# Patient Record
Sex: FEMALE | Race: BLACK OR AFRICAN AMERICAN | NOT HISPANIC OR LATINO | Employment: UNEMPLOYED | URBAN - METROPOLITAN AREA
[De-identification: names, ages, dates, MRNs, and addresses within clinical notes are randomized per-mention and may not be internally consistent; named-entity substitution may affect disease eponyms.]

---
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
## 2018-05-03 PROBLEM — Z13.220 SCREENING FOR CHOLESTEROL LEVEL: Status: ACTIVE | Noted: 2018-05-03

## 2018-05-03 PROBLEM — B37.31 YEAST VAGINITIS: Status: ACTIVE | Noted: 2018-05-03

## 2018-08-06 PROBLEM — Z13.220 SCREENING FOR CHOLESTEROL LEVEL: Status: RESOLVED | Noted: 2018-05-03 | Resolved: 2018-08-06

## 2018-11-06 ENCOUNTER — OFFICE VISIT (OUTPATIENT)
Dept: OBSTETRICS AND GYNECOLOGY | Facility: CLINIC | Age: 18
End: 2018-11-06
Payer: MEDICAID

## 2018-11-06 VITALS
BODY MASS INDEX: 22.56 KG/M2 | DIASTOLIC BLOOD PRESSURE: 72 MMHG | HEIGHT: 65 IN | HEART RATE: 79 BPM | WEIGHT: 135.38 LBS | RESPIRATION RATE: 13 BRPM | SYSTOLIC BLOOD PRESSURE: 118 MMHG

## 2018-11-06 DIAGNOSIS — Z11.3 SCREEN FOR STD (SEXUALLY TRANSMITTED DISEASE): ICD-10-CM

## 2018-11-06 DIAGNOSIS — B20 HIV INFECTION: ICD-10-CM

## 2018-11-06 DIAGNOSIS — N76.0 VAGINOSIS: ICD-10-CM

## 2018-11-06 DIAGNOSIS — Z12.4 SCREENING FOR CERVICAL CANCER: ICD-10-CM

## 2018-11-06 DIAGNOSIS — Z01.419 WELL WOMAN EXAM WITH ROUTINE GYNECOLOGICAL EXAM: Primary | ICD-10-CM

## 2018-11-06 PROCEDURE — 88142 CYTOPATH C/V THIN LAYER: CPT | Performed by: PATHOLOGY

## 2018-11-06 PROCEDURE — 87491 CHLMYD TRACH DNA AMP PROBE: CPT

## 2018-11-06 PROCEDURE — 88141 CYTOPATH C/V INTERPRET: CPT | Mod: ,,, | Performed by: PATHOLOGY

## 2018-11-06 PROCEDURE — 99395 PREV VISIT EST AGE 18-39: CPT | Mod: S$PBB,,, | Performed by: OBSTETRICS & GYNECOLOGY

## 2018-11-06 PROCEDURE — 99999 PR PBB SHADOW E&M-EST. PATIENT-LVL III: CPT | Mod: PBBFAC,,, | Performed by: OBSTETRICS & GYNECOLOGY

## 2018-11-06 PROCEDURE — 99213 OFFICE O/P EST LOW 20 MIN: CPT | Mod: PBBFAC | Performed by: OBSTETRICS & GYNECOLOGY

## 2018-11-06 PROCEDURE — 87660 TRICHOMONAS VAGIN DIR PROBE: CPT

## 2018-11-06 RX ORDER — METRONIDAZOLE 500 MG/1
500 TABLET ORAL EVERY 12 HOURS
Qty: 14 TABLET | Refills: 0 | Status: SHIPPED | OUTPATIENT
Start: 2018-11-06 | End: 2018-11-13

## 2018-11-06 NOTE — PROGRESS NOTES
Subjective:    Patient ID: Nidhi Willis is a 18 y.o. y.o. female.     Chief Complaint: Annual Well Woman Exam     History of Present Illness:  Nidhi presents today for Annual Well Woman exam. She describes her menses as regular every month without intermenstrual spotting.She denies pelvic pain.  She denies breast tenderness, masses, nipple discharge. She denies difficulty with urination or bowel movements. She reports vaginal discharge with odor after every menstrual cycle. She denies bloating, early satiety, or weight changes. She is sexually active. Contraception is by condoms.      Menstrual History:   Patient's last menstrual period was 10/28/2018..     OB History      Para Term  AB Living    1 1 1 0 0 0    SAB TAB Ectopic Multiple Live Births    0 0 0 0            The following portions of the patient's history were reviewed and updated as appropriate: allergies, current medications, past family history, past medical history, past social history, past surgical history and problem list.    ROS:   CONSTITUTIONAL: weight gain, Denies: fever, chills, diaphoresis, weakness, fatigue, weight loss  ENT: negative for sore throat, nasal congestion, nasal discharge, epistaxis, tinnitus, hearing loss  EYES: negative for blurry vision, decreased vision, loss of vision, eye pain, diplopia, photophobia, discharge  SKIN: Negative for rash, itching, hives  RESPIRATORY: negative for cough, hemoptysis, shortness of breath, pleuritic chest pain, wheezing  CARDIOVASCULAR: negative for chest pain, dyspnea on exertion, orthopnea, paroxysmal nocturnal dyspnea, edema, palpitations  BREAST: negative for breast  tenderness, breast mass, nipple discharge, or skin changes  GASTROINTESTINAL: negative for abdominal pain, flank pain, nausea, vomiting, diarrhea, constipation, black stool, blood in stool  GENITOURINARY: negative for abnormal vaginal bleeding, amenorrhea, decreased libido, dysuria, genital sores,  hematuria, incontinence, menorrhagia, pelvic pain, urinary frequency, vaginal discharge  HEMATOLOGIC/LYMPHATIC: negative for swollen lymph nodes, bleeding, bruising  MUSCULOSKELETAL: negative for back pain, joint pain, joint stiffness, joint swelling, muscle pain, muscle weakness  NEUROLOGICAL: negative for dizzy/vertigo, headache, focal weakness, numbness/tingling, speech problems, loss of consciousness, confusion, memory loss  BEHAVORIAL/PSYCH: negative for anxiety, depression, psychosis  ENDOCRINE: negative for polydipsia/polyuria, palpitations, skin changes, temperature intolerance, unexpected weight changes  ALLERGIC/IMMUNOLOGIC: negative for urticaria, hay fever, angioedema      Objective:    Vital Signs:  Vitals:    11/06/18 1324   BP: 118/72   Pulse: 79   Resp: 13       Physical Exam:  General:  alert, cooperative, no distress   Skin:  Skin color, texture, turgor normal. No rashes or lesions   HEENT:  extra ocular movement intact, sclera clear, anicteric   Neck: supple, trachea midline, no adenopathy or thyromegally   Respiratory:  Normal effort   Breasts:  no discharge, erythema, tenderness, or palpable masses; no axillary lymphadenopathy   Abdomen:  soft, nontender, no palpable masses   Pelvis: External genitalia: normal general appearance  Urinary system: urethral meatus normal, bladder nontender  Vaginal: normal mucosa without prolapse or lesions, discharge, white  Cervix: normal appearance  Uterus: normal size, shape, position  Adnexa: normal size, nontender bilaterally   Extremities: Normal ROM; no edema, no cyanosis   Neurologial: Normal strength and tone. No focal numbness or weakness.   Psychiatric: normal mood, speech, dress, and thought processes         Assessment:       Healthy female exam.     1. Well woman exam with routine gynecological exam    2. Vaginosis    3. Screen for STD (sexually transmitted disease)    4. HIV infection    5. Screening for cervical cancer          Plan:      Well  woman exam with routine gynecological exam    Vaginosis  -     C. trachomatis/N. gonorrhoeae by AMP DNA [facility]; Cervix  -     Vaginosis Screen by DNA Probe  -     metroNIDAZOLE (FLAGYL) 500 MG tablet; Take 1 tablet (500 mg total) by mouth every 12 (twelve) hours. for 7 days  Dispense: 14 tablet; Refill: 0    Screen for STD (sexually transmitted disease)  -     C. trachomatis/N. gonorrhoeae by AMP [facility]; Cervix  -     Vaginosis Screen by DNA Probe    HIV infection  -     Liquid-based pap smear, screening    Screening for cervical cancer  -     Liquid-based pap smear, screening    Declines contraception  Discussed BV prevention strategies.      COUNSELING:  Nidhi was counseled on STD pevention, use and side-effects of various contraceptive measures, A.C.O.G. Pap guidelines and recommendations for yearly pelvic exams in addition to recommendations for monthly self breast exams; to see her PCP for other health maintenance.

## 2018-11-07 LAB
C TRACH DNA SPEC QL NAA+PROBE: NOT DETECTED
CANDIDA RRNA VAG QL PROBE: NEGATIVE
G VAGINALIS RRNA GENITAL QL PROBE: POSITIVE
N GONORRHOEA DNA SPEC QL NAA+PROBE: NOT DETECTED
T VAGINALIS RRNA GENITAL QL PROBE: NEGATIVE

## 2019-03-15 ENCOUNTER — OFFICE VISIT (OUTPATIENT)
Dept: OBSTETRICS AND GYNECOLOGY | Facility: CLINIC | Age: 19
End: 2019-03-15
Payer: MEDICAID

## 2019-03-15 VITALS
HEIGHT: 65 IN | SYSTOLIC BLOOD PRESSURE: 118 MMHG | HEART RATE: 65 BPM | WEIGHT: 135.38 LBS | DIASTOLIC BLOOD PRESSURE: 72 MMHG | RESPIRATION RATE: 13 BRPM | BODY MASS INDEX: 22.56 KG/M2

## 2019-03-15 DIAGNOSIS — Z12.4 CERVICAL CANCER SCREENING: ICD-10-CM

## 2019-03-15 DIAGNOSIS — B20 HIV DISEASE: ICD-10-CM

## 2019-03-15 DIAGNOSIS — N89.8 VAGINAL DISCHARGE: Primary | ICD-10-CM

## 2019-03-15 DIAGNOSIS — Z11.3 SCREEN FOR STD (SEXUALLY TRANSMITTED DISEASE): ICD-10-CM

## 2019-03-15 PROCEDURE — 88175 CYTOPATH C/V AUTO FLUID REDO: CPT

## 2019-03-15 PROCEDURE — 99213 OFFICE O/P EST LOW 20 MIN: CPT | Mod: PBBFAC | Performed by: OBSTETRICS & GYNECOLOGY

## 2019-03-15 PROCEDURE — 87491 CHLMYD TRACH DNA AMP PROBE: CPT

## 2019-03-15 PROCEDURE — 99999 PR PBB SHADOW E&M-EST. PATIENT-LVL III: CPT | Mod: PBBFAC,,, | Performed by: OBSTETRICS & GYNECOLOGY

## 2019-03-15 PROCEDURE — 99999 PR PBB SHADOW E&M-EST. PATIENT-LVL III: ICD-10-PCS | Mod: PBBFAC,,, | Performed by: OBSTETRICS & GYNECOLOGY

## 2019-03-15 PROCEDURE — 99213 OFFICE O/P EST LOW 20 MIN: CPT | Mod: S$PBB,,, | Performed by: OBSTETRICS & GYNECOLOGY

## 2019-03-15 PROCEDURE — 99213 PR OFFICE/OUTPT VISIT, EST, LEVL III, 20-29 MIN: ICD-10-PCS | Mod: S$PBB,,, | Performed by: OBSTETRICS & GYNECOLOGY

## 2019-03-15 PROCEDURE — 87480 CANDIDA DNA DIR PROBE: CPT

## 2019-03-15 RX ORDER — METRONIDAZOLE 250 MG/1
500 TABLET ORAL EVERY 6 HOURS
COMMUNITY
End: 2019-07-18

## 2019-03-15 NOTE — PROGRESS NOTES
Subjective:    Patient ID: Nidhi Willis is a 18 y.o. y.o. female.     Chief Complaint:   Chief Complaint   Patient presents with    Follow-up     repeat unsat pap    STD CHECK       History of Present Illness   Nidhi presents today complaining of discharge and odor. Symptoms begin usually after her period finishes and is sometimes worse than others.  She would also like to have GC/CT testing done today.    Nidhi has HIV, so she needs pap testing.  She had a pap smear done at the end of last year, but there were not enough cells so this was read as unsatisfactory.  We will repeat this today for her.   She uses condoms for birth control.     ROS:   CONSTITUTIONAL: Negative for fever, chills, diaphoresis, weakness, fatigue, weight loss, weight gain  GASTROINTESTINAL: negative for abdominal pain, flank pain, nausea, vomiting, diarrhea, constipation, black stool, blood in stool  BREAST: negative for breast  tenderness, breast mass, nipple discharge, or skin changes  GENITOURINARY: genital discharge, Denies: dysuria, frequency/urgency, hematuria, vaginal bleeding, irregular menses, heavy menses, pelvic pain  HEMATOLOGIC/LYMPHATIC: negative for swollen lymph nodes, bleeding, bruising  MUSCULOSKELETAL: negative for back pain, joint pain, joint stiffness, joint swelling, muscle pain, muscle weakness  ENDOCRINE: negative for polydipsia/polyuria, palpitations, skin changes, temperature intolerance, unexpected weight changes       Objective:    Vital Signs:  Vitals:    03/15/19 1025   BP: 118/72   Pulse: 65   Resp: 13       Physical Exam:  General:  alert, cooperative, no distress   Neuro/Psych: AAOx3, appropriate mood and affect   Head: Normocephalic, atraumatic   Neck: Supple, normal ROM   Resp: Normal effort   Skin:  Skin color, texture, turgor normal. No rashes or lesions   Abdomen:  soft, nontender, no palpable masses   Pelvis: External genitalia: normal general appearance  Urinary system: urethral meatus  normal, bladder nontender  Vaginal: normal mucosa without prolapse or lesions, discharge, white  Cervix: normal appearance  Uterus: normal size, shape, position  Adnexa: normal size, nontender bilaterally         Assessment:      1. Vaginal discharge    2. Cervical cancer screening    3. HIV disease    4. Screen for STD (sexually transmitted disease)          Plan:      Vaginal discharge  -     C. trachomatis/N. gonorrhoeae by AMP [facility]; Cervix  -     Vaginosis Screen by DNA Probe    Cervical cancer screening  -     Liquid-based pap smear, screening    HIV disease  -     Liquid-based pap smear, screening    Screen for STD (sexually transmitted disease)  -     C. trachomatis/N. gonorrhoeae by AMP [facility]; Cervix  -     Vaginosis Screen by DNA Probe

## 2019-03-18 LAB
BACTERIAL VAGINOSIS DNA: POSITIVE
C TRACH DNA SPEC QL NAA+PROBE: NOT DETECTED
CANDIDA GLABRATA DNA: NEGATIVE
CANDIDA KRUSEI DNA: NEGATIVE
CANDIDA RRNA VAG QL PROBE: NEGATIVE
N GONORRHOEA DNA SPEC QL NAA+PROBE: NOT DETECTED
T VAGINALIS RRNA GENITAL QL PROBE: NEGATIVE

## 2019-03-18 RX ORDER — METRONIDAZOLE 500 MG/1
500 TABLET ORAL EVERY 12 HOURS
Qty: 14 TABLET | Refills: 0 | Status: SHIPPED | OUTPATIENT
Start: 2019-03-18 | End: 2019-03-25

## 2019-03-22 NOTE — PROGRESS NOTES
CC: Post-partum follow-up    Nidhi Willis is a 16 y.o. female  presents for a postpartum visit.  She is status post  , due to active HSV genital lesion 6 weeks ago.  Her hospitalization was not complicated.  She is not breastfeeding secondary to h/o HIV.  She desires condoms for contraception.  She denies postpartum depression. She and the baby are doing well.  No pain.  No fever.   No bowel / bladder complaints. Pregnancy was complicated by: HIV, Chlamydia, Abnormal QUAD, Primary herpes lesion    Delivery Date: 2017  Delivery MD: Jose L  Gender: male      Her last pap was No result found      ROS:  GENERAL: No fever, chills, fatigability.  VULVAR: No pain, no lesions and no itching.  VAGINAL: No relaxation, no itching, no discharge, no abnormal bleeding and no lesions.  ABDOMEN: No abdominal pain. Denies nausea. Denies vomiting. No diarrhea. No constipation  BREAST: Denies pain. No lumps. No discharge.  URINARY: No incontinence, no nocturia, no frequency and no dysuria.  CARDIOVASCULAR: No chest pain. No shortness of breath. No leg cramps.  NEUROLOGICAL: No headaches. No vision changes.    Past Medical History:   Diagnosis Date    Herpes simplex virus (HSV) infection     HIV infection     HIV positive     Hx of tonsillectomy     Seizures      Past Surgical History:   Procedure Laterality Date     SECTION  2017    TONSILLECTOMY      tubes in ears       Review of patient's allergies indicates:   Allergen Reactions    Aspirin Hives       Current Outpatient Prescriptions:     lamivudine-zidovudine 150-300mg (COMBIVIR) 150-300 mg Tab, Take 1 tablet by mouth 2 (two) times daily., Disp: 60 tablet, Rfl: 5    lopinavir-ritonavir 200-50 mg (KALETRA) 200-50 mg Tab, Take 2 tablets by mouth 2 (two) times daily., Disp: 120 tablet, Rfl: 5    acyclovir (ZOVIRAX) 400 MG tablet, Take 1 tablet (400 mg total) by mouth 3 (three) times daily., Disp: 30 tablet, Rfl:  Child's Well Visit, 4 Years: Care Instructions Your Care Instructions Your child probably likes to sing songs, hop, and dance around. At age 3, children are more independent and may prefer to dress themselves. Most 3year-olds can tell someone their first and last name. They usually can draw a person with three body parts, like a head, body, and arms or legs. Most children at this age like to hop on one foot, ride a tricycle (or a small bike with training wheels), throw a ball overhand, and go up and down stairs without holding onto anything. Your child probably likes to dress and undress on his or her own. Some 3year-olds know what is real and what is pretend but most will play make-believe. Many four-year-olds like to tell short stories. Follow-up care is a key part of your child's treatment and safety. Be sure to make and go to all appointments, and call your doctor if your child is having problems. It's also a good idea to know your child's test results and keep a list of the medicines your child takes. How can you care for your child at home? Eating and a healthy weight · Encourage healthy eating habits. Most children do well with three meals and two or three snacks a day. Start with small, easy-to-achieve changes, such as offering more fruits and vegetables at meals and snacks. Give him or her nonfat and low-fat dairy foods and whole grains, such as rice, pasta, or whole wheat bread, at every meal. 
· Check in with your child's school or day care to make sure that healthy meals and snacks are given. · Do not eat much fast food. Choose healthy snacks that are low in sugar, fat, and salt instead of candy, chips, and other junk foods. · Offer water when your child is thirsty. Do not give your child juice drinks more than once a day. Juice does not have the valuable fiber that whole fruit has. Do not give your child soda pop. 2      Vitals:    05/02/17 1628   BP: 116/74   Pulse: 86   Resp: 12       NAD, AAOx3  Incision: well healed  Abdomen: soft, nontender    Assessment:    1. Normal postpartum exam  2. Doing well S/P 1LTCS      Plan:    1. Routine follow up.  2. Instructions / precautions reviewed  3. Contraceptive counseling: patient desires condoms only.  Discussed failure rate.  4. May resume normal activities  5. Return: for annual exam prior to August.  Discussed need for pap smear prior to 1 year from HIV diagnosis.  We also discussed need for annual STD screening.      Discussed disclosure of HIV infection to current partner.  Patient reports that she has notified him of the HIV diagnosis.  They are still together and using condoms with intercourse.   · Make meals a family time. Have nice conversations at mealtime and turn the TV off. If your child decides not to eat at a meal, wait until the next snack or meal to offer food. · Do not use food as a reward or punishment for your child's behavior. Do not make your children \"clean their plates. \" · Let all your children know that you love them whatever their size. Help your child feel good about himself or herself. Remind your child that people come in different shapes and sizes. Do not tease or nag your child about his or her weight, and do not say your child is skinny, fat, or chubby. · Limit TV or video time to 1 hour a day. Research shows that the more TV a child watches, the higher the chance that he or she will be overweight. Do not put a TV in your child's bedroom, and do not use TV and videos as a . Healthy habits · Have your child play actively for at least 30 to 60 minutes every day. Plan family activities, such as trips to the park, walks, bike rides, swimming, and gardening. · Help your child brush his or her teeth 2 times a day and floss one time a day. · Do not let your child watch more than 1 hour of TV or video a day. Check for TV programs that are good for 3year olds. · Put a broad-spectrum sunscreen (SPF 30 or higher) on your child before he or she goes outside. Use a broad-brimmed hat to shade his or her ears, nose, and lips. · Do not smoke or allow others to smoke around your child. Smoking around your child increases the child's risk for ear infections, asthma, colds, and pneumonia. If you need help quitting, talk to your doctor about stop-smoking programs and medicines. These can increase your chances of quitting for good. Safety · For every ride in a car, secure your child into a properly installed car seat that meets all current safety standards. For questions about car seats and booster seats, call the [employer] at 9-995.259.5235. · Make sure your child wears a helmet that fits properly when he or she rides a bike. · Keep cleaning products and medicines in locked cabinets out of your child's reach. Keep the number for Poison Control (0-218.988.9111) near your phone. · Put locks or guards on all windows above the first floor. Watch your child at all times near play equipment and stairs. · Watch your child at all times when he or she is near water, including pools, hot tubs, and bathtubs. · Do not let your child play in or near the street. Children younger than age 6 should not cross the street alone. Immunizations Flu immunization is recommended once a year for all children ages 7 months and older. Parenting · Read stories to your child every day. One way children learn to read is by hearing the same story over and over. · Play games, talk, and sing to your child every day. Give him or her love and attention. · Give your child simple chores to do. Children usually like to help. · Teach your child not to take anything from strangers and not to go with strangers. · Praise good behavior. Do not yell or spank. Use time-out instead. Be fair with your rules and use them in the same way every time. Your child learns from watching and listening to you. Getting ready for  Most children start  between 3 and 10years old. It can be hard to know when your child is ready for school. Your local elementary school or  can help. Most children are ready for  if they can do these things: 
· Your child can keep hands to himself or herself while in line; sit and pay attention for at least 5 minutes; sit quietly while listening to a story; help with clean-up activities, such as putting away toys; use words for frustration rather than acting out; work and play with other children in small groups; do what the teacher asks; get dressed; and use the bathroom without help. · Your child can stand and hop on one foot; throw and catch balls; hold a pencil correctly; cut with scissors; and copy or trace a line and Solomon. · Your child can spell and write his or her first name; do two-step directions, like \"do this and then do that\"; talk with other children and adults; sing songs with a group; count from 1 to 5; see the difference between two objects, such as one is large and one is small; and understand what \"first\" and \"last\" mean. When should you call for help? Watch closely for changes in your child's health, and be sure to contact your doctor if: 
  · You are concerned that your child is not growing or developing normally.  
  · You are worried about your child's behavior.  
  · You need more information about how to care for your child, or you have questions or concerns. Where can you learn more? Go to http://carlos a-miles.info/. Enter M033 in the search box to learn more about \"Child's Well Visit, 4 Years: Care Instructions. \" Current as of: March 27, 2018 Content Version: 11.9 © 6132-9433 [employer]. Care instructions adapted under license by [employer] (which disclaims liability or warranty for this information). If you have questions about a medical condition or this instruction, always ask your healthcare professional. [address] any warranty or liability for your use of this information.

## 2019-04-03 ENCOUNTER — HOSPITAL ENCOUNTER (EMERGENCY)
Facility: HOSPITAL | Age: 19
Discharge: HOME | End: 2019-04-04
Attending: SURGERY
Payer: MEDICAID

## 2019-04-03 VITALS
SYSTOLIC BLOOD PRESSURE: 138 MMHG | HEART RATE: 110 BPM | TEMPERATURE: 99 F | BODY MASS INDEX: 22.47 KG/M2 | DIASTOLIC BLOOD PRESSURE: 78 MMHG | WEIGHT: 135 LBS | RESPIRATION RATE: 20 BRPM

## 2019-04-03 DIAGNOSIS — N30.00 ACUTE CYSTITIS WITHOUT HEMATURIA: Primary | ICD-10-CM

## 2019-04-03 LAB
BASOPHILS # BLD AUTO: 0.02 K/UL (ref 0–0.2)
BASOPHILS NFR BLD: 0.1 % (ref 0–1.9)
DIFFERENTIAL METHOD: ABNORMAL
EOSINOPHIL # BLD AUTO: 0.1 K/UL (ref 0–0.5)
EOSINOPHIL NFR BLD: 0.4 % (ref 0–8)
ERYTHROCYTE [DISTWIDTH] IN BLOOD BY AUTOMATED COUNT: 13 % (ref 11.5–14.5)
HCT VFR BLD AUTO: 38.8 % (ref 37–48.5)
HGB BLD-MCNC: 12.7 G/DL (ref 12–16)
LYMPHOCYTES # BLD AUTO: 2.2 K/UL (ref 1–4.8)
LYMPHOCYTES NFR BLD: 13.5 % (ref 18–48)
MCH RBC QN AUTO: 26.6 PG (ref 27–31)
MCHC RBC AUTO-ENTMCNC: 32.7 G/DL (ref 32–36)
MCV RBC AUTO: 81 FL (ref 82–98)
MONOCYTES # BLD AUTO: 1.1 K/UL (ref 0.3–1)
MONOCYTES NFR BLD: 7.1 % (ref 4–15)
NEUTROPHILS # BLD AUTO: 12.6 K/UL (ref 1.8–7.7)
NEUTROPHILS NFR BLD: 78.9 % (ref 38–73)
PLATELET # BLD AUTO: 193 K/UL (ref 150–350)
PMV BLD AUTO: 10.2 FL (ref 9.2–12.9)
RBC # BLD AUTO: 4.77 M/UL (ref 4–5.4)
WBC # BLD AUTO: 15.97 K/UL (ref 3.9–12.7)

## 2019-04-03 PROCEDURE — 83690 ASSAY OF LIPASE: CPT

## 2019-04-03 PROCEDURE — 96372 THER/PROPH/DIAG INJ SC/IM: CPT

## 2019-04-03 PROCEDURE — 81000 URINALYSIS NONAUTO W/SCOPE: CPT | Mod: 59

## 2019-04-03 PROCEDURE — 36415 COLL VENOUS BLD VENIPUNCTURE: CPT

## 2019-04-03 PROCEDURE — 87086 URINE CULTURE/COLONY COUNT: CPT

## 2019-04-03 PROCEDURE — 85025 COMPLETE CBC W/AUTO DIFF WBC: CPT

## 2019-04-03 PROCEDURE — 80307 DRUG TEST PRSMV CHEM ANLYZR: CPT

## 2019-04-03 PROCEDURE — 81025 URINE PREGNANCY TEST: CPT

## 2019-04-03 PROCEDURE — 99284 EMERGENCY DEPT VISIT MOD MDM: CPT | Mod: 25

## 2019-04-03 PROCEDURE — 80053 COMPREHEN METABOLIC PANEL: CPT

## 2019-04-04 LAB
ALBUMIN SERPL BCP-MCNC: 4.2 G/DL (ref 3.5–5.2)
ALP SERPL-CCNC: 66 U/L (ref 55–135)
ALT SERPL W/O P-5'-P-CCNC: 10 U/L (ref 10–44)
AMPHET+METHAMPHET UR QL: NEGATIVE
ANION GAP SERPL CALC-SCNC: 10 MMOL/L (ref 8–16)
AST SERPL-CCNC: 11 U/L (ref 10–40)
B-HCG UR QL: NEGATIVE
BACTERIA #/AREA URNS HPF: ABNORMAL /HPF
BARBITURATES UR QL SCN>200 NG/ML: NEGATIVE
BENZODIAZ UR QL SCN>200 NG/ML: NEGATIVE
BILIRUB SERPL-MCNC: 0.7 MG/DL (ref 0.1–1)
BILIRUB UR QL STRIP: NEGATIVE
BUN SERPL-MCNC: 10 MG/DL (ref 6–20)
BZE UR QL SCN: NEGATIVE
CALCIUM SERPL-MCNC: 9.7 MG/DL (ref 8.7–10.5)
CANNABINOIDS UR QL SCN: ABNORMAL
CHLORIDE SERPL-SCNC: 103 MMOL/L (ref 95–110)
CLARITY UR: CLEAR
CO2 SERPL-SCNC: 25 MMOL/L (ref 23–29)
COLOR UR: YELLOW
CREAT SERPL-MCNC: 0.8 MG/DL (ref 0.5–1.4)
CREAT UR-MCNC: 325.6 MG/DL (ref 15–325)
EST. GFR  (AFRICAN AMERICAN): >60 ML/MIN/1.73 M^2
EST. GFR  (NON AFRICAN AMERICAN): >60 ML/MIN/1.73 M^2
GLUCOSE SERPL-MCNC: 94 MG/DL (ref 70–110)
GLUCOSE UR QL STRIP: NEGATIVE
HGB UR QL STRIP: ABNORMAL
KETONES UR QL STRIP: ABNORMAL
LEUKOCYTE ESTERASE UR QL STRIP: ABNORMAL
LIPASE SERPL-CCNC: 5 U/L (ref 4–60)
METHADONE UR QL SCN>300 NG/ML: NEGATIVE
MICROSCOPIC COMMENT: ABNORMAL
NITRITE UR QL STRIP: NEGATIVE
OPIATES UR QL SCN: NEGATIVE
PCP UR QL SCN>25 NG/ML: NEGATIVE
PH UR STRIP: 6 [PH] (ref 5–8)
POTASSIUM SERPL-SCNC: 3.6 MMOL/L (ref 3.5–5.1)
PROT SERPL-MCNC: 7.6 G/DL (ref 6–8.4)
PROT UR QL STRIP: ABNORMAL
RBC #/AREA URNS HPF: 4 /HPF (ref 0–4)
SODIUM SERPL-SCNC: 138 MMOL/L (ref 136–145)
SP GR UR STRIP: >=1.03 (ref 1–1.03)
TOXICOLOGY INFORMATION: ABNORMAL
URN SPEC COLLECT METH UR: ABNORMAL
UROBILINOGEN UR STRIP-ACNC: NEGATIVE EU/DL
WBC #/AREA URNS HPF: 35 /HPF (ref 0–5)

## 2019-04-04 PROCEDURE — 63600175 PHARM REV CODE 636 W HCPCS: Performed by: SURGERY

## 2019-04-04 RX ORDER — NITROFURANTOIN 25; 75 MG/1; MG/1
100 CAPSULE ORAL 2 TIMES DAILY
Qty: 14 CAPSULE | Refills: 0 | Status: SHIPPED | OUTPATIENT
Start: 2019-04-04 | End: 2019-04-11

## 2019-04-04 RX ORDER — CEFTRIAXONE 1 G/1
1 INJECTION, POWDER, FOR SOLUTION INTRAMUSCULAR; INTRAVENOUS
Status: COMPLETED | OUTPATIENT
Start: 2019-04-04 | End: 2019-04-04

## 2019-04-04 RX ORDER — PHENAZOPYRIDINE HYDROCHLORIDE 200 MG/1
200 TABLET, FILM COATED ORAL 3 TIMES DAILY
Qty: 6 TABLET | Refills: 0 | Status: SHIPPED | OUTPATIENT
Start: 2019-04-04 | End: 2019-04-14

## 2019-04-04 RX ADMIN — CEFTRIAXONE SODIUM 1 G: 1 INJECTION, POWDER, FOR SOLUTION INTRAMUSCULAR; INTRAVENOUS at 01:04

## 2019-04-04 NOTE — ED PROVIDER NOTES
[facility]                                                 Chief Complaint  18 y.o. female with Abdominal Pain    History of Present Illness  Nidhi Willis presents to the emergency room with cramps today  Patient has periumbilical abdominal cramps, denies nausea vomiting this p.m.  Patient states she gets typically gets cramps with her monthly menstrual cycle  Pt states that her monthly menstrual cycles the stopped with continued cramps  Patient has normal bowel sounds and a nonacute abdomen, denies fever  Patient denies any dysuria hematuria, denies any vaginal discharge now  Patient denies any risk for STD or PID, afebrile VSS on ER triage today    The history is provided by the patient   device was not used during this ER visit    Past Medical History   -- HIV positive     -- Hx of tonsillectomy     -- Seizures        Past Surgical History   -- Tonsillectomy       -- Tubes in ears          ALLERGIES: Aspirin     Review of Systems and Physical Exam      Review of Systems  -- Constitution - no fever, denies fatigue, no weakness, no chills  -- Eyes - no tearing or redness, no visual disturbance  -- Ear, Nose - no tinnitus or earache, no nasal congestion or discharge  -- Mouth,Throat - no sore throat, no toothache, normal voice, normal swallowing  -- Respiratory - denies cough and congestion, no shortness of breath, no OHARA  -- Cardiovascular - denies chest pain, no palpitations, denies claudication  -- Gastrointestinal - abdominal cramps with no nausea, vomiting, & diarrhea   -- Genitourinary - no dysuria, no hematuria, no flank pain, no bladder pain  -- Musculoskeletal - denies back pain, negative for trauma or injury  -- Neurological - no headache, denies weakness or seizure; no LOC  -- Skin - denies pallor, rash, or changes in skin. no hives or welts noted    Vital Signs  Her oral temperature is 98.6 °F (37 °C).   Her blood pressure is 138/78 and her pulse is  110.   Her respiration is 20.     Physical Exam  -- Nursing note and vitals reviewed  -- Constitutional: Appears well-developed and well-nourished  -- Head: Atraumatic. Normocephalic. No obvious abnormality  -- Eyes: Pupils are equal and reactive to light. Normal conjunctiva and lids  -- Cardiac: Normal rate, regular rhythm and normal heart sounds  -- Pulmonary: Normal respiratory effort, breath sounds clear to auscultation  -- Abdominal: Soft, no tenderness. Normal bowel sounds. Normal liver edge  -- Genitourinary: no flank pain on exam, no suprapubic pain by palpation   -- Musculoskeletal: Normal range of motion, no effusions. Joints stable   -- Neurological: No focal deficits. Showed good interaction with staff  -- Skin: Warm and dry. No evidence of rash or cellulitis    Emergency Room Course      Lab Results     K 3.6      CO2 25   BUN 10   CREATININE 0.8   GLU 94   ALKPHOS 66   AST 11   ALT 10   BILITOT 0.7   ALBUMIN 4.2   PROT 7.6   WBC 15.97 (H)   HGB 12.7   HCT 38.8        Urinalysis  -- Urinalysis performed during this ER visit showed signs of infection  -- The urine today has been sent for lab culture, results pending      Radiology  -- The CT stone protocol performed in the ER today was negative for acute pathology    Medications Given  -- IM 1 g Rocephin given today in the ER    Diagnosis  -- The encounter diagnosis was Acute cystitis without hematuria.    Disposition and Plan  -- Disposition: home  -- Condition: stable  -- Follow-up: Patient to follow up with Fanny Lozano MD in 1-2 days.  -- I advised the patient that we have found no life threatening condition today  -- At this time, I believe the patient is clinically stable for discharge.   -- The patient acknowledges that close follow up with a MD is required   -- Patient agrees to comply with all instruction and direction given in the ER    This note is dictated on M*Modal word recognition program.  There are word recognition  mistakes that are occasionally missed on review.          Abelardo Simpson MD  04/04/19 0154

## 2019-04-04 NOTE — ED NOTES
The patient is awake and alert. Airway is open and patent, respirations are spontaneous, normal respiratory effort and rate noted, skin warm and dry, full ROM in all extremities, appearance: no apparent distress noted, resting comfortably.  VSS, no change from previous assessment.  Bed in low, locked position. Pt able to change position independently. Will continue to monitor.

## 2019-04-04 NOTE — ED TRIAGE NOTES
"18 y.o. female presents to ER   Chief Complaint   Patient presents with    Abdominal Pain   pt c/o lower abd. pain & "slight" headache x's 2 days, denies N/V/D or fever, reports menstrual cycle ended yesterday. No acute distress noted.    "

## 2019-04-05 LAB — BACTERIA UR CULT: NORMAL

## 2019-06-19 ENCOUNTER — HOSPITAL ENCOUNTER (EMERGENCY)
Facility: HOSPITAL | Age: 19
Discharge: HOME | End: 2019-06-19
Attending: SURGERY
Payer: MEDICAID

## 2019-06-19 VITALS
HEIGHT: 65 IN | OXYGEN SATURATION: 100 % | TEMPERATURE: 98 F | WEIGHT: 135 LBS | SYSTOLIC BLOOD PRESSURE: 136 MMHG | DIASTOLIC BLOOD PRESSURE: 78 MMHG | RESPIRATION RATE: 16 BRPM | HEART RATE: 87 BPM | BODY MASS INDEX: 22.49 KG/M2

## 2019-06-19 DIAGNOSIS — M54.2 POSTERIOR NECK PAIN: ICD-10-CM

## 2019-06-19 DIAGNOSIS — S16.1XXA STRAIN OF NECK MUSCLE, INITIAL ENCOUNTER: Primary | ICD-10-CM

## 2019-06-19 LAB — B-HCG UR QL: NEGATIVE

## 2019-06-19 PROCEDURE — 81025 URINE PREGNANCY TEST: CPT

## 2019-06-19 PROCEDURE — 99284 EMERGENCY DEPT VISIT MOD MDM: CPT | Mod: 25

## 2019-06-19 PROCEDURE — 25000003 PHARM REV CODE 250: Performed by: NURSE PRACTITIONER

## 2019-06-19 RX ORDER — CYCLOBENZAPRINE HCL 10 MG
10 TABLET ORAL 3 TIMES DAILY PRN
Qty: 15 TABLET | Refills: 0 | Status: SHIPPED | OUTPATIENT
Start: 2019-06-19 | End: 2019-06-24

## 2019-06-19 RX ORDER — CYCLOBENZAPRINE HCL 10 MG
10 TABLET ORAL
Status: COMPLETED | OUTPATIENT
Start: 2019-06-19 | End: 2019-06-19

## 2019-06-19 RX ORDER — IBUPROFEN 800 MG/1
800 TABLET ORAL EVERY 8 HOURS PRN
Qty: 20 TABLET | Refills: 0 | Status: SHIPPED | OUTPATIENT
Start: 2019-06-19 | End: 2019-10-31

## 2019-06-19 RX ORDER — IBUPROFEN 800 MG/1
800 TABLET ORAL
Status: COMPLETED | OUTPATIENT
Start: 2019-06-19 | End: 2019-06-19

## 2019-06-19 RX ADMIN — CYCLOBENZAPRINE HYDROCHLORIDE 10 MG: 10 TABLET, FILM COATED ORAL at 11:06

## 2019-06-19 RX ADMIN — IBUPROFEN 800 MG: 800 TABLET ORAL at 11:06
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
## 2019-06-30 ENCOUNTER — HOSPITAL ENCOUNTER (EMERGENCY)
Facility: HOSPITAL | Age: 19
Discharge: HOME | End: 2019-07-01
Attending: SURGERY
Payer: MEDICAID

## 2019-06-30 VITALS
BODY MASS INDEX: 21.35 KG/M2 | OXYGEN SATURATION: 99 % | TEMPERATURE: 100 F | DIASTOLIC BLOOD PRESSURE: 81 MMHG | RESPIRATION RATE: 18 BRPM | SYSTOLIC BLOOD PRESSURE: 156 MMHG | HEART RATE: 108 BPM | WEIGHT: 128.31 LBS

## 2019-06-30 DIAGNOSIS — Z21 HIV POSITIVE: ICD-10-CM

## 2019-06-30 DIAGNOSIS — R50.9 FEVER, UNSPECIFIED FEVER CAUSE: Primary | ICD-10-CM

## 2019-06-30 LAB
ALBUMIN SERPL BCP-MCNC: 4.7 G/DL (ref 3.5–5.2)
ALP SERPL-CCNC: 61 U/L (ref 55–135)
ALT SERPL W/O P-5'-P-CCNC: 12 U/L (ref 10–44)
AMPHET+METHAMPHET UR QL: NEGATIVE
ANION GAP SERPL CALC-SCNC: 15 MMOL/L (ref 8–16)
AST SERPL-CCNC: 16 U/L (ref 10–40)
B-HCG UR QL: NEGATIVE
BARBITURATES UR QL SCN>200 NG/ML: NEGATIVE
BASOPHILS # BLD AUTO: 0.02 K/UL (ref 0–0.2)
BASOPHILS NFR BLD: 0.1 % (ref 0–1.9)
BENZODIAZ UR QL SCN>200 NG/ML: NEGATIVE
BILIRUB SERPL-MCNC: 0.8 MG/DL (ref 0.1–1)
BILIRUB UR QL STRIP: NEGATIVE
BUN SERPL-MCNC: 5 MG/DL (ref 6–20)
BZE UR QL SCN: NEGATIVE
CALCIUM SERPL-MCNC: 9.7 MG/DL (ref 8.7–10.5)
CANNABINOIDS UR QL SCN: NORMAL
CHLORIDE SERPL-SCNC: 101 MMOL/L (ref 95–110)
CLARITY UR: CLEAR
CO2 SERPL-SCNC: 21 MMOL/L (ref 23–29)
COLOR UR: YELLOW
CREAT SERPL-MCNC: 0.8 MG/DL (ref 0.5–1.4)
CREAT UR-MCNC: 40.8 MG/DL (ref 15–325)
DEPRECATED S PYO AG THROAT QL EIA: NEGATIVE
DIFFERENTIAL METHOD: ABNORMAL
EOSINOPHIL # BLD AUTO: 0 K/UL (ref 0–0.5)
EOSINOPHIL NFR BLD: 0.1 % (ref 0–8)
ERYTHROCYTE [DISTWIDTH] IN BLOOD BY AUTOMATED COUNT: 13.4 % (ref 11.5–14.5)
EST. GFR  (AFRICAN AMERICAN): >60 ML/MIN/1.73 M^2
EST. GFR  (NON AFRICAN AMERICAN): >60 ML/MIN/1.73 M^2
GLUCOSE SERPL-MCNC: 88 MG/DL (ref 70–110)
GLUCOSE UR QL STRIP: NEGATIVE
HCT VFR BLD AUTO: 40 % (ref 37–48.5)
HETEROPH AB SERPL QL IA: NEGATIVE
HGB BLD-MCNC: 13.2 G/DL (ref 12–16)
HGB UR QL STRIP: ABNORMAL
IMM GRANULOCYTES # BLD AUTO: 0.05 K/UL (ref 0–0.04)
IMM GRANULOCYTES NFR BLD AUTO: 0.4 % (ref 0–0.5)
INFLUENZA A, MOLECULAR: NEGATIVE
INFLUENZA B, MOLECULAR: NEGATIVE
KETONES UR QL STRIP: NEGATIVE
LEUKOCYTE ESTERASE UR QL STRIP: NEGATIVE
LYMPHOCYTES # BLD AUTO: 1.6 K/UL (ref 1–4.8)
LYMPHOCYTES NFR BLD: 11.3 % (ref 18–48)
MAGNESIUM SERPL-MCNC: 1.7 MG/DL (ref 1.6–2.6)
MCH RBC QN AUTO: 26.2 PG (ref 27–31)
MCHC RBC AUTO-ENTMCNC: 33 G/DL (ref 32–36)
MCV RBC AUTO: 80 FL (ref 82–98)
METHADONE UR QL SCN>300 NG/ML: NEGATIVE
MICROSCOPIC COMMENT: NORMAL
MONOCYTES # BLD AUTO: 1 K/UL (ref 0.3–1)
MONOCYTES NFR BLD: 6.9 % (ref 4–15)
NEUTROPHILS # BLD AUTO: 11.6 K/UL (ref 1.8–7.7)
NEUTROPHILS NFR BLD: 81.2 % (ref 38–73)
NITRITE UR QL STRIP: NEGATIVE
NRBC BLD-RTO: 0 /100 WBC
OPIATES UR QL SCN: NEGATIVE
PCP UR QL SCN>25 NG/ML: NEGATIVE
PH UR STRIP: 6 [PH] (ref 5–8)
PHOSPHATE SERPL-MCNC: 3.4 MG/DL (ref 2.7–4.5)
PLATELET # BLD AUTO: 242 K/UL (ref 150–350)
PMV BLD AUTO: 10.5 FL (ref 9.2–12.9)
POTASSIUM SERPL-SCNC: 3.3 MMOL/L (ref 3.5–5.1)
PROT SERPL-MCNC: 8.2 G/DL (ref 6–8.4)
PROT UR QL STRIP: NEGATIVE
RBC # BLD AUTO: 5.03 M/UL (ref 4–5.4)
RBC #/AREA URNS HPF: 2 /HPF (ref 0–4)
SODIUM SERPL-SCNC: 137 MMOL/L (ref 136–145)
SP GR UR STRIP: <=1.005 (ref 1–1.03)
SPECIMEN SOURCE: NORMAL
TOXICOLOGY INFORMATION: NORMAL
TSH SERPL DL<=0.005 MIU/L-ACNC: 0.51 UIU/ML (ref 0.4–4)
URN SPEC COLLECT METH UR: ABNORMAL
UROBILINOGEN UR STRIP-ACNC: NEGATIVE EU/DL
WBC # BLD AUTO: 14.23 K/UL (ref 3.9–12.7)
WBC #/AREA URNS HPF: 3 /HPF (ref 0–5)

## 2019-06-30 PROCEDURE — 85025 COMPLETE CBC W/AUTO DIFF WBC: CPT

## 2019-06-30 PROCEDURE — 81000 URINALYSIS NONAUTO W/SCOPE: CPT | Mod: 59

## 2019-06-30 PROCEDURE — 87491 CHLMYD TRACH DNA AMP PROBE: CPT

## 2019-06-30 PROCEDURE — 86308 HETEROPHILE ANTIBODY SCREEN: CPT

## 2019-06-30 PROCEDURE — 83735 ASSAY OF MAGNESIUM: CPT

## 2019-06-30 PROCEDURE — 80053 COMPREHEN METABOLIC PANEL: CPT

## 2019-06-30 PROCEDURE — 84100 ASSAY OF PHOSPHORUS: CPT

## 2019-06-30 PROCEDURE — 81025 URINE PREGNANCY TEST: CPT

## 2019-06-30 PROCEDURE — 63600175 PHARM REV CODE 636 W HCPCS: Performed by: SURGERY

## 2019-06-30 PROCEDURE — 99284 EMERGENCY DEPT VISIT MOD MDM: CPT | Mod: 25

## 2019-06-30 PROCEDURE — 87502 INFLUENZA DNA AMP PROBE: CPT

## 2019-06-30 PROCEDURE — 96365 THER/PROPH/DIAG IV INF INIT: CPT

## 2019-06-30 PROCEDURE — 25000003 PHARM REV CODE 250: Performed by: INTERNAL MEDICINE

## 2019-06-30 PROCEDURE — 25000003 PHARM REV CODE 250: Performed by: SURGERY

## 2019-06-30 PROCEDURE — 86592 SYPHILIS TEST NON-TREP QUAL: CPT

## 2019-06-30 PROCEDURE — 87081 CULTURE SCREEN ONLY: CPT

## 2019-06-30 PROCEDURE — 84443 ASSAY THYROID STIM HORMONE: CPT

## 2019-06-30 PROCEDURE — 87880 STREP A ASSAY W/OPTIC: CPT

## 2019-06-30 PROCEDURE — 80307 DRUG TEST PRSMV CHEM ANLYZR: CPT

## 2019-06-30 PROCEDURE — 80074 ACUTE HEPATITIS PANEL: CPT

## 2019-06-30 RX ORDER — AZITHROMYCIN 250 MG/1
1000 TABLET, FILM COATED ORAL
Status: COMPLETED | OUTPATIENT
Start: 2019-06-30 | End: 2019-06-30

## 2019-06-30 RX ORDER — POTASSIUM CHLORIDE 20 MEQ/1
40 TABLET, EXTENDED RELEASE ORAL
Status: COMPLETED | OUTPATIENT
Start: 2019-06-30 | End: 2019-06-30

## 2019-06-30 RX ORDER — ACETAMINOPHEN 500 MG
1000 TABLET ORAL
Status: COMPLETED | OUTPATIENT
Start: 2019-06-30 | End: 2019-06-30

## 2019-06-30 RX ORDER — SODIUM CHLORIDE 9 MG/ML
1000 INJECTION, SOLUTION INTRAVENOUS
Status: COMPLETED | OUTPATIENT
Start: 2019-06-30 | End: 2019-06-30

## 2019-06-30 RX ADMIN — SODIUM CHLORIDE 1000 ML: 0.9 INJECTION, SOLUTION INTRAVENOUS at 10:06

## 2019-06-30 RX ADMIN — ACETAMINOPHEN 1000 MG: 500 TABLET, FILM COATED ORAL at 10:06

## 2019-06-30 RX ADMIN — AZITHROMYCIN 1000 MG: 250 TABLET, FILM COATED ORAL at 10:06

## 2019-06-30 RX ADMIN — POTASSIUM CHLORIDE 40 MEQ: 1500 TABLET, EXTENDED RELEASE ORAL at 11:06

## 2019-06-30 RX ADMIN — CEFTRIAXONE 2 G: 2 INJECTION, SOLUTION INTRAVENOUS at 10:06

## 2019-07-01 LAB
C TRACH DNA SPEC QL NAA+PROBE: NOT DETECTED
HAV IGM SERPL QL IA: NEGATIVE
HBV CORE IGM SERPL QL IA: NEGATIVE
HBV SURFACE AG SERPL QL IA: NEGATIVE
HCV AB SERPL QL IA: NEGATIVE
N GONORRHOEA DNA SPEC QL NAA+PROBE: NOT DETECTED
RPR SER QL: NORMAL

## 2019-07-01 PROCEDURE — 25000003 PHARM REV CODE 250: Performed by: INTERNAL MEDICINE

## 2019-07-01 RX ORDER — HYDROCODONE BITARTRATE AND ACETAMINOPHEN 5; 325 MG/1; MG/1
1 TABLET ORAL
Status: COMPLETED | OUTPATIENT
Start: 2019-07-01 | End: 2019-07-01

## 2019-07-01 RX ORDER — HYDROCODONE BITARTRATE AND ACETAMINOPHEN 5; 325 MG/1; MG/1
TABLET ORAL
Status: DISCONTINUED
Start: 2019-07-01 | End: 2019-07-01 | Stop reason: HOSPADM

## 2019-07-01 RX ORDER — HYDROCODONE BITARTRATE AND ACETAMINOPHEN 5; 325 MG/1; MG/1
1 TABLET ORAL EVERY 4 HOURS PRN
Qty: 8 TABLET | Refills: 0 | Status: SHIPPED | OUTPATIENT
Start: 2019-07-01 | End: 2019-07-11

## 2019-07-01 RX ADMIN — HYDROCODONE BITARTRATE AND ACETAMINOPHEN 1 TABLET: 5; 325 TABLET ORAL at 12:07

## 2019-07-01 NOTE — ED TRIAGE NOTES
19 y.o. female presents to ER ED 03 /ED 03A   Chief Complaint   Patient presents with    Generalized Body Aches   Pt reports generalized body aches and fatigue onset today. No acute distress noted.

## 2019-07-01 NOTE — ED PROVIDER NOTES
Encounter Date: 2019       History     Chief Complaint   -- Generalized Body Aches     HPI   Nidhi Willis is a 19 y.o. female with generalized body aches  Patient with generalized body aches, not feeling well this afternoon  Patient denies any nausea vomiting or diarrhea, denies any hematuria  Patient denies any dysuria, denies any vaginal discharge on ER interview  States that she has had generalized body aches, 100.7° F temperature     History is provided by the patient  Medical history: HSV, HIV, seizures  Surgeries:  and tonsillectomy and PE tube  Allergies: Asthma    Review of Systems and Physical Exam      Review of Systems   Constitutional: Positive for fatigue. Negative for chills, diaphoresis and fever.   HENT: Negative.    Eyes: Negative.    Respiratory: Negative.    Cardiovascular: Negative.    Gastrointestinal: Positive for nausea. Negative for abdominal pain, diarrhea and vomiting.   Genitourinary: Negative.    Musculoskeletal: Negative.    Skin: Negative.    Neurological: Negative.    Psychiatric/Behavioral: Negative.      BP (!) 138/95  Pulse (!) 112   Temp (!) 100.7 °F (38.2 °C) (Oral)   Resp 18    Physical Exam    Constitutional: She appears well-developed.   HENT:   Head: Normocephalic.   Right Ear: External ear normal.   Left Ear: External ear normal.   Nose: Nose normal.   Mouth/Throat: Oropharynx is clear and moist.   Eyes: EOM are normal. Pupils are equal, round, and reactive to light.   Neck: Normal range of motion.   Cardiovascular: Normal rate, regular rhythm and normal heart sounds.   Pulmonary/Chest: Breath sounds normal.   Abdominal: Soft.   Neurological: She is alert and oriented to person, place, and time.   Skin: Skin is warm.       ED Course   Procedures  Labs Reviewed   INFLUENZA A & B BY MOLECULAR   DRUG SCREEN PANEL, URINE EMERGENCY   TSH   MAGNESIUM   PHOSPHORUS   PREGNANCY TEST, URINE RAPID   URINALYSIS, REFLEX TO URINE CULTURE   CBC W/ AUTO  DIFFERENTIAL   COMPREHENSIVE METABOLIC PANEL          Imaging Results    None                               Clinical Impression:       ICD-10-CM ICD-9-CM   1. Fever, unspecified fever cause R50.9 780.60   2. HIV positive Z21 V08         Disposition:   Disposition: Discharged  Condition: Stable                        Tamara Martinez MD  07/01/19 0136

## 2019-07-03 LAB — BACTERIA THROAT CULT: NORMAL

## 2019-07-18 ENCOUNTER — HOSPITAL ENCOUNTER (EMERGENCY)
Facility: HOSPITAL | Age: 19
Discharge: HOME | End: 2019-07-18
Attending: SURGERY
Payer: MEDICAID

## 2019-07-18 VITALS
OXYGEN SATURATION: 100 % | DIASTOLIC BLOOD PRESSURE: 93 MMHG | SYSTOLIC BLOOD PRESSURE: 131 MMHG | TEMPERATURE: 97 F | BODY MASS INDEX: 21.3 KG/M2 | WEIGHT: 128 LBS | HEART RATE: 99 BPM | RESPIRATION RATE: 18 BRPM

## 2019-07-18 DIAGNOSIS — N89.8 VAGINAL DISCHARGE: ICD-10-CM

## 2019-07-18 DIAGNOSIS — N30.00 ACUTE CYSTITIS WITHOUT HEMATURIA: ICD-10-CM

## 2019-07-18 DIAGNOSIS — M54.2 NECK PAIN: Primary | ICD-10-CM

## 2019-07-18 LAB
B-HCG UR QL: NEGATIVE
BACTERIA #/AREA URNS HPF: NORMAL /HPF
BILIRUB UR QL STRIP: NEGATIVE
C TRACH DNA SPEC QL NAA+PROBE: NOT DETECTED
CLARITY UR: CLEAR
COLOR UR: YELLOW
GLUCOSE UR QL STRIP: NEGATIVE
HGB UR QL STRIP: ABNORMAL
KETONES UR QL STRIP: NEGATIVE
LEUKOCYTE ESTERASE UR QL STRIP: ABNORMAL
MICROSCOPIC COMMENT: NORMAL
N GONORRHOEA DNA SPEC QL NAA+PROBE: NOT DETECTED
NITRITE UR QL STRIP: NEGATIVE
PH UR STRIP: 6 [PH] (ref 5–8)
PROT UR QL STRIP: NEGATIVE
RBC #/AREA URNS HPF: 1 /HPF (ref 0–4)
SP GR UR STRIP: 1.02 (ref 1–1.03)
SQUAMOUS #/AREA URNS HPF: 10 /HPF
URN SPEC COLLECT METH UR: ABNORMAL
UROBILINOGEN UR STRIP-ACNC: NEGATIVE EU/DL
WBC #/AREA URNS HPF: 2 /HPF (ref 0–5)

## 2019-07-18 PROCEDURE — 99284 EMERGENCY DEPT VISIT MOD MDM: CPT

## 2019-07-18 PROCEDURE — 25000003 PHARM REV CODE 250: Performed by: NURSE PRACTITIONER

## 2019-07-18 PROCEDURE — 81025 URINE PREGNANCY TEST: CPT

## 2019-07-18 PROCEDURE — 81000 URINALYSIS NONAUTO W/SCOPE: CPT

## 2019-07-18 PROCEDURE — 87491 CHLMYD TRACH DNA AMP PROBE: CPT

## 2019-07-18 RX ORDER — KETOROLAC TROMETHAMINE 10 MG/1
10 TABLET, FILM COATED ORAL EVERY 6 HOURS
Qty: 12 TABLET | Refills: 0 | Status: SHIPPED | OUTPATIENT
Start: 2019-07-18 | End: 2019-10-31

## 2019-07-18 RX ORDER — NITROFURANTOIN 25; 75 MG/1; MG/1
100 CAPSULE ORAL 2 TIMES DAILY
Qty: 14 CAPSULE | Refills: 0 | Status: SHIPPED | OUTPATIENT
Start: 2019-07-18 | End: 2019-07-25

## 2019-07-18 RX ORDER — KETOROLAC TROMETHAMINE 10 MG/1
10 TABLET, FILM COATED ORAL
Status: COMPLETED | OUTPATIENT
Start: 2019-07-18 | End: 2019-07-18

## 2019-07-18 RX ORDER — METHOCARBAMOL 500 MG/1
500 TABLET, FILM COATED ORAL EVERY 8 HOURS PRN
Qty: 21 TABLET | Refills: 0 | Status: SHIPPED | OUTPATIENT
Start: 2019-07-18 | End: 2019-10-31

## 2019-07-18 RX ORDER — METRONIDAZOLE 500 MG/1
500 TABLET ORAL EVERY 12 HOURS
Qty: 14 TABLET | Refills: 0 | Status: SHIPPED | OUTPATIENT
Start: 2019-07-18 | End: 2019-07-25

## 2019-07-18 RX ORDER — FLUCONAZOLE 150 MG/1
150 TABLET ORAL
Status: COMPLETED | OUTPATIENT
Start: 2019-07-18 | End: 2019-07-18

## 2019-07-18 RX ADMIN — FLUCONAZOLE 150 MG: 150 TABLET ORAL at 11:07

## 2019-07-18 RX ADMIN — KETOROLAC TROMETHAMINE 10 MG: 10 TABLET, FILM COATED ORAL at 11:07
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
## 2019-07-18 NOTE — DISCHARGE INSTRUCTIONS
**Follow up with PCP and GYN in 24-48 hours. Return to ER with worsening of symptoms. Do not take other NSAIDs while taking toradol. You may also take over-the-counter Tylenol as directed on package insert as needed for pain.    **Our goal in the emergency department is to always give you outstanding care and exceptional service. You may receive a survey by mail or e-mail in the next week regarding your experience in our ED. We would greatly appreciate your completing and returning the survey. Your feedback provides us with a way to recognize our staff who give very good care and it helps us learn how to improve when your experience was below our aspiration of excellence.

## 2019-08-24 ENCOUNTER — HOSPITAL ENCOUNTER (EMERGENCY)
Facility: HOSPITAL | Age: 19
Discharge: HOME | End: 2019-08-24
Attending: EMERGENCY MEDICINE
Payer: MEDICAID

## 2019-08-24 VITALS
HEART RATE: 80 BPM | OXYGEN SATURATION: 99 % | WEIGHT: 130 LBS | SYSTOLIC BLOOD PRESSURE: 120 MMHG | RESPIRATION RATE: 16 BRPM | BODY MASS INDEX: 21.63 KG/M2 | TEMPERATURE: 99 F | DIASTOLIC BLOOD PRESSURE: 72 MMHG

## 2019-08-24 DIAGNOSIS — N39.0 URINARY TRACT INFECTION WITHOUT HEMATURIA, SITE UNSPECIFIED: Primary | ICD-10-CM

## 2019-08-24 LAB
B-HCG UR QL: NEGATIVE
BACTERIA #/AREA URNS HPF: NORMAL /HPF
BILIRUB UR QL STRIP: NEGATIVE
CLARITY UR: CLEAR
COLOR UR: YELLOW
GLUCOSE UR QL STRIP: NEGATIVE
HGB UR QL STRIP: NEGATIVE
KETONES UR QL STRIP: NEGATIVE
LEUKOCYTE ESTERASE UR QL STRIP: ABNORMAL
MICROSCOPIC COMMENT: NORMAL
NITRITE UR QL STRIP: NEGATIVE
PH UR STRIP: 6 [PH] (ref 5–8)
PROT UR QL STRIP: NEGATIVE
RBC #/AREA URNS HPF: 0 /HPF (ref 0–4)
SP GR UR STRIP: 1.02 (ref 1–1.03)
URN SPEC COLLECT METH UR: ABNORMAL
UROBILINOGEN UR STRIP-ACNC: NEGATIVE EU/DL
WBC #/AREA URNS HPF: 5 /HPF (ref 0–5)

## 2019-08-24 PROCEDURE — 81000 URINALYSIS NONAUTO W/SCOPE: CPT

## 2019-08-24 PROCEDURE — 99284 EMERGENCY DEPT VISIT MOD MDM: CPT

## 2019-08-24 PROCEDURE — 81025 URINE PREGNANCY TEST: CPT

## 2019-08-24 PROCEDURE — 87086 URINE CULTURE/COLONY COUNT: CPT

## 2019-08-24 RX ORDER — CLINDAMYCIN HYDROCHLORIDE 150 MG/1
300 CAPSULE ORAL 4 TIMES DAILY
Qty: 56 CAPSULE | Refills: 0 | Status: SHIPPED | OUTPATIENT
Start: 2019-08-24 | End: 2019-08-31

## 2019-08-24 RX ORDER — PHENAZOPYRIDINE HYDROCHLORIDE 200 MG/1
200 TABLET, FILM COATED ORAL 3 TIMES DAILY
Qty: 6 TABLET | Refills: 0 | Status: SHIPPED | OUTPATIENT
Start: 2019-08-24 | End: 2019-09-03

## 2019-08-24 NOTE — ED PROVIDER NOTES
[facility]                                                  Chief Complaint  19 y.o. female with Abdominal Pain    History of Present Illness  Nidhi Willis presents to the emergency room with complaints of a possible urinary tract infection.  Patient reports that she recently had 1 and was treated with clindamycin seem to work.  She reports that she is having frequency, dysuria, foul odor.  Denies fever or back pain.    Past Medical History:   Diagnosis Date    Herpes simplex virus (HSV) infection     HIV infection     HIV positive     Hx of tonsillectomy     Seizures      Past Surgical History:   Procedure Laterality Date     SECTION  2017    PRIMARY DELIVERY- SECTION N/A 3/22/2017    Performed by [facility] at [facility] OR    TONSILLECTOMY      tubes in ears        Review of patient's allergies indicates:   Allergen Reactions    Aspirin Hives        Review of Systems and Physical Exam     Review of Systems  -- Constitution - no fever, no weight loss, no loss of consciousness  -- Eyes - no changes in vision, no redness, no swelling  -- Ear, Nose - no  earache, denies congestion  -- Mouth,Throat - no sore throat, no toothache, normal voice, normal swallowing  -- Respiratory - denies cough and congestion, no shortness of breath, no wheezing  -- Cardiovascular - denies chest pain, no palpitations,   -- Gastrointestinal - denies abdominal pain, denies nausea, vomiting, and diarrhea  -- Genitourinary - reports dysuria, no denies flank pain, reports frequency  -- Musculoskeletal - denies back pain, negative for myalgias and arthralgias   -- Neurological - no headache, no neurologic changes, no loss of bladder or bowel function no seizure like activity, no changes in hearing or vision  -- Skin - denies skin changes, no rash, no hives, no suspected skin infection    Vital Signs   weight is 59 kg (130 lb). Her oral temperature is 98.6 °F (37 °C). Her  blood pressure is 129/84 and her pulse is 84. Her respiration is 16 and oxygen saturation is 99%.      Physical Exam  -- Nursing note and vitals reviewed  -- Constitutional:  Awake alert and oriented, GCS 15, no acute distress.  Appears well.  -- Head: Atraumatic. Normocephalic. No obvious abnormality  -- Eyes: Pupils are equal and reactive to light. Extraocular movements intact. No nystagmus.  No periorbital swelling. Normal conjunctiva.  -- Nose: Nose grossly normal in appearance, nares grossly normal. No rhinorrhea.  -- Throat: Mucous membranes moist, pharynx normal, normal tonsils.  Airway patent.  -- Ears: External ears and TM normal bilaterally. Normal hearing.   -- Neck: Normal range of motion. Neck supple. No meningismus. No adenopathy  -- Cardiac: Normal rate, regular rhythm and normal heart sounds. No carotid bruit. No lower extremity edema.  -- Pulmonary: Normal respiratory effort, breath sounds equal bilaterally. Adequate flow.  No wheezing.  No crackles.  -- Abdominal: Soft, no tenderness, no guarding, no rebound. Normal bowel sounds.   -- Musculoskeletal: Normal range of motion, all 4 extremities 5/5 strength.  Neurovascularly intact. Atraumatic. No deformities. No CVA tenderness  -- Neurological:  Cranial nerves 2-12 grossly intact. No focal deficits.   -- Vascular: Posterior tibial, dorsalis pedis and radial pulses 2+ bilaterally    -- Lymphatics: No cervical or peripheral lymphadenopathy.   -- Skin: Warm and dry. No evidence of rash or cellulitis  -- Psychiatric: Normal mood and affect. Bedside behavior is appropriate.  Patient is cooperative.  Denies suicidal homicidal ideation.    Emergency Room Course     Treatment Course, Evaluation, and Medical Decision Making  1.  Physical exam unremarkable, vital signs stable within normal limits  2.  Urine pregnancy test negative  3.  Urinalysis positive for leuk esterase  4.  Urine culture sent  5.  DC home with clindamycin, follow-up primary  care    Abnormal lab values  Labs Reviewed   URINALYSIS   PREGNANCY TEST, URINE RAPID           Diagnosis  --  urinary tract infection    Disposition and Plan  -- Disposition: home  -- Condition: stable  -- Follow-up: Patient to follow up with Fanny Lozano MD in 1-2 days, and any specialists noted on discharge paperwork  -- I advised the patient that we have found no life threatening condition today  -- At this time, I believe the patient is clinically stable for discharge.   -- The patient acknowledges that close follow up with a MD is required   -- Patient agrees to comply with all instruction and direction given in the ER  -- Patient counseled on strict return precautions as discussed       Bess Jay MD  08/24/19 3706

## 2019-08-25 LAB — BACTERIA UR CULT: NORMAL

## 2019-09-11 ENCOUNTER — HOSPITAL ENCOUNTER (EMERGENCY)
Facility: HOSPITAL | Age: 19
Discharge: HOME | End: 2019-09-11
Attending: SURGERY
Payer: MEDICAID

## 2019-09-11 VITALS
TEMPERATURE: 96 F | DIASTOLIC BLOOD PRESSURE: 92 MMHG | HEART RATE: 72 BPM | OXYGEN SATURATION: 100 % | BODY MASS INDEX: 19.49 KG/M2 | RESPIRATION RATE: 18 BRPM | SYSTOLIC BLOOD PRESSURE: 150 MMHG | HEIGHT: 66 IN | WEIGHT: 121.25 LBS

## 2019-09-11 DIAGNOSIS — K52.9 GASTROENTERITIS: Primary | ICD-10-CM

## 2019-09-11 LAB
ALBUMIN SERPL BCP-MCNC: 4.1 G/DL (ref 3.5–5.2)
ALP SERPL-CCNC: 58 U/L (ref 55–135)
ALT SERPL W/O P-5'-P-CCNC: 17 U/L (ref 10–44)
AMPHET+METHAMPHET UR QL: NEGATIVE
ANION GAP SERPL CALC-SCNC: 12 MMOL/L (ref 8–16)
AST SERPL-CCNC: 30 U/L (ref 10–40)
B-HCG UR QL: NEGATIVE
BACTERIA #/AREA URNS HPF: ABNORMAL /HPF
BARBITURATES UR QL SCN>200 NG/ML: NEGATIVE
BASOPHILS # BLD AUTO: 0.03 K/UL (ref 0–0.2)
BASOPHILS NFR BLD: 0.2 % (ref 0–1.9)
BENZODIAZ UR QL SCN>200 NG/ML: NEGATIVE
BILIRUB SERPL-MCNC: 0.4 MG/DL (ref 0.1–1)
BILIRUB UR QL STRIP: NEGATIVE
BUN SERPL-MCNC: 6 MG/DL (ref 6–20)
BZE UR QL SCN: NEGATIVE
CALCIUM SERPL-MCNC: 9 MG/DL (ref 8.7–10.5)
CANNABINOIDS UR QL SCN: NORMAL
CHLORIDE SERPL-SCNC: 106 MMOL/L (ref 95–110)
CLARITY UR: CLEAR
CO2 SERPL-SCNC: 23 MMOL/L (ref 23–29)
COLOR UR: YELLOW
CREAT SERPL-MCNC: 0.8 MG/DL (ref 0.5–1.4)
CREAT UR-MCNC: 125.8 MG/DL (ref 15–325)
DIFFERENTIAL METHOD: ABNORMAL
EOSINOPHIL # BLD AUTO: 0.1 K/UL (ref 0–0.5)
EOSINOPHIL NFR BLD: 0.4 % (ref 0–8)
ERYTHROCYTE [DISTWIDTH] IN BLOOD BY AUTOMATED COUNT: 13.2 % (ref 11.5–14.5)
EST. GFR  (AFRICAN AMERICAN): >60 ML/MIN/1.73 M^2
EST. GFR  (NON AFRICAN AMERICAN): >60 ML/MIN/1.73 M^2
GLUCOSE SERPL-MCNC: 129 MG/DL (ref 70–110)
GLUCOSE UR QL STRIP: NEGATIVE
HCT VFR BLD AUTO: 39.2 % (ref 37–48.5)
HGB BLD-MCNC: 12.4 G/DL (ref 12–16)
HGB UR QL STRIP: NEGATIVE
HYALINE CASTS #/AREA URNS LPF: 0 /LPF
IMM GRANULOCYTES # BLD AUTO: 0.04 K/UL (ref 0–0.04)
IMM GRANULOCYTES NFR BLD AUTO: 0.3 % (ref 0–0.5)
KETONES UR QL STRIP: NEGATIVE
LEUKOCYTE ESTERASE UR QL STRIP: ABNORMAL
LIPASE SERPL-CCNC: 6 U/L (ref 4–60)
LYMPHOCYTES # BLD AUTO: 3.4 K/UL (ref 1–4.8)
LYMPHOCYTES NFR BLD: 22.7 % (ref 18–48)
MCH RBC QN AUTO: 25.9 PG (ref 27–31)
MCHC RBC AUTO-ENTMCNC: 31.6 G/DL (ref 32–36)
MCV RBC AUTO: 82 FL (ref 82–98)
METHADONE UR QL SCN>300 NG/ML: NEGATIVE
MICROSCOPIC COMMENT: ABNORMAL
MONOCYTES # BLD AUTO: 0.8 K/UL (ref 0.3–1)
MONOCYTES NFR BLD: 5.6 % (ref 4–15)
NEUTROPHILS # BLD AUTO: 10.5 K/UL (ref 1.8–7.7)
NEUTROPHILS NFR BLD: 70.8 % (ref 38–73)
NITRITE UR QL STRIP: NEGATIVE
NRBC BLD-RTO: 0 /100 WBC
OPIATES UR QL SCN: NEGATIVE
PCP UR QL SCN>25 NG/ML: NEGATIVE
PH UR STRIP: 7 [PH] (ref 5–8)
PLATELET # BLD AUTO: 194 K/UL (ref 150–350)
PMV BLD AUTO: 10.1 FL (ref 9.2–12.9)
POTASSIUM SERPL-SCNC: 3.2 MMOL/L (ref 3.5–5.1)
PROT SERPL-MCNC: 6.9 G/DL (ref 6–8.4)
PROT UR QL STRIP: NEGATIVE
RBC # BLD AUTO: 4.78 M/UL (ref 4–5.4)
SODIUM SERPL-SCNC: 141 MMOL/L (ref 136–145)
SP GR UR STRIP: 1.02 (ref 1–1.03)
SQUAMOUS #/AREA URNS HPF: 15 /HPF
TOXICOLOGY INFORMATION: NORMAL
URN SPEC COLLECT METH UR: ABNORMAL
UROBILINOGEN UR STRIP-ACNC: NEGATIVE EU/DL
WBC # BLD AUTO: 14.79 K/UL (ref 3.9–12.7)
WBC #/AREA URNS HPF: 2 /HPF (ref 0–5)

## 2019-09-11 PROCEDURE — 96365 THER/PROPH/DIAG IV INF INIT: CPT

## 2019-09-11 PROCEDURE — 80307 DRUG TEST PRSMV CHEM ANLYZR: CPT

## 2019-09-11 PROCEDURE — 80053 COMPREHEN METABOLIC PANEL: CPT

## 2019-09-11 PROCEDURE — 99285 EMERGENCY DEPT VISIT HI MDM: CPT | Mod: 25

## 2019-09-11 PROCEDURE — 63600175 PHARM REV CODE 636 W HCPCS: Performed by: NURSE PRACTITIONER

## 2019-09-11 PROCEDURE — 81000 URINALYSIS NONAUTO W/SCOPE: CPT | Mod: 59

## 2019-09-11 PROCEDURE — 96375 TX/PRO/DX INJ NEW DRUG ADDON: CPT

## 2019-09-11 PROCEDURE — 85025 COMPLETE CBC W/AUTO DIFF WBC: CPT

## 2019-09-11 PROCEDURE — 25500020 PHARM REV CODE 255: Performed by: SURGERY

## 2019-09-11 PROCEDURE — 63600175 PHARM REV CODE 636 W HCPCS: Performed by: SURGERY

## 2019-09-11 PROCEDURE — 83690 ASSAY OF LIPASE: CPT

## 2019-09-11 PROCEDURE — 36415 COLL VENOUS BLD VENIPUNCTURE: CPT

## 2019-09-11 PROCEDURE — 81025 URINE PREGNANCY TEST: CPT

## 2019-09-11 RX ORDER — ONDANSETRON 4 MG/1
4 TABLET, ORALLY DISINTEGRATING ORAL EVERY 8 HOURS PRN
Qty: 9 TABLET | Refills: 0 | Status: SHIPPED | OUTPATIENT
Start: 2019-09-11 | End: 2019-09-14

## 2019-09-11 RX ORDER — ONDANSETRON 2 MG/ML
4 INJECTION INTRAMUSCULAR; INTRAVENOUS
Status: COMPLETED | OUTPATIENT
Start: 2019-09-11 | End: 2019-09-11

## 2019-09-11 RX ORDER — DICYCLOMINE HYDROCHLORIDE 20 MG/1
20 TABLET ORAL 4 TIMES DAILY PRN
Qty: 20 TABLET | Refills: 0 | Status: SHIPPED | OUTPATIENT
Start: 2019-09-11 | End: 2019-09-16

## 2019-09-11 RX ADMIN — PROMETHAZINE HYDROCHLORIDE 12.5 MG: 25 INJECTION INTRAMUSCULAR; INTRAVENOUS at 02:09

## 2019-09-11 RX ADMIN — SODIUM CHLORIDE 1000 ML: 0.9 INJECTION, SOLUTION INTRAVENOUS at 02:09

## 2019-09-11 RX ADMIN — ONDANSETRON 4 MG: 2 INJECTION INTRAMUSCULAR; INTRAVENOUS at 03:09

## 2019-09-11 RX ADMIN — IOHEXOL 75 ML: 350 INJECTION, SOLUTION INTRAVENOUS at 04:09

## 2019-09-11 NOTE — ED PROVIDER NOTES
"Encounter Date: 2019       History     Chief Complaint   Patient presents with    Abdominal Pain     Nausea/Vomiting     The history is provided by the patient.   GI Problem   The current episode started just prior to arrival. The abdominal pain is located in the epigastric region. The abdominal pain does not radiate. The severity of the abdominal pain is 10/10. The other symptoms of the illness include nausea and vomiting. The other symptoms of the illness do not include fever, shortness of breath, diarrhea or dysuria.   Nausea began today.   The vomiting began today. Vomiting occurs 2 to 5 times per day.   Symptoms associated with the illness do not include constipation, urgency, frequency or back pain.     Patient is concerned that her symptoms are related to "eating bad food."    Review of patient's allergies indicates:   Allergen Reactions    Aspirin Hives     Past Medical History:   Diagnosis Date    Herpes simplex virus (HSV) infection     HIV infection     HIV positive     Hx of tonsillectomy     Seizures      Past Surgical History:   Procedure Laterality Date     SECTION  2017    PRIMARY DELIVERY- SECTION N/A 3/22/2017    Performed by Anna Domingo MD at [facility]    TONSILLECTOMY      tubes in ears       Family History   Problem Relation Age of Onset    Hypertension Mother     No Known Problems Father     No Known Problems Sister     No Known Problems Brother     No Known Problems Maternal Aunt     No Known Problems Maternal Uncle     No Known Problems Paternal Aunt     No Known Problems Paternal Uncle     No Known Problems Maternal Grandmother     No Known Problems Maternal Grandfather     No Known Problems Paternal Grandmother     No Known Problems Paternal Grandfather     ADD / ADHD Neg Hx     Alcohol abuse Neg Hx     Allergies Neg Hx     Asthma Neg Hx     Autism spectrum disorder Neg Hx     Behavior problems Neg Hx     Birth defects Neg Hx  "    Cancer Neg Hx     Chromosomal disorder Neg Hx     Cleft lip Neg Hx     Congenital heart disease Neg Hx     Depression Neg Hx     Diabetes Neg Hx     Early death Neg Hx     Eczema Neg Hx     Hearing loss Neg Hx     Heart disease Neg Hx     Hyperlipidemia Neg Hx     Kidney disease Neg Hx     Learning disabilities Neg Hx     Mental illness Neg Hx     Migraines Neg Hx     Neurodegenerative disease Neg Hx     Obesity Neg Hx     Seizures Neg Hx     SIDS Neg Hx     Thyroid disease Neg Hx     Other Neg Hx     Breast cancer Neg Hx     Ovarian cancer Neg Hx      Social History     Tobacco Use    Smoking status: Never Smoker    Smokeless tobacco: Never Used   Substance Use Topics    Alcohol use: Yes     Comment: socially    Drug use: Yes     Types: Marijuana     Comment: last use 8/20/2016     Review of Systems   Constitutional: Negative for fever.   HENT: Negative for congestion, ear pain, rhinorrhea, sore throat and trouble swallowing.    Eyes: Negative for pain, discharge, redness and visual disturbance.   Respiratory: Negative for cough, shortness of breath and wheezing.    Cardiovascular: Negative for chest pain and leg swelling.   Gastrointestinal: Positive for abdominal pain, nausea and vomiting. Negative for constipation and diarrhea.   Genitourinary: Negative for difficulty urinating, dysuria, flank pain, frequency and urgency.   Musculoskeletal: Negative for arthralgias, back pain, myalgias and neck pain.   Skin: Negative for rash and wound.   Neurological: Negative for seizures, weakness and headaches.   Psychiatric/Behavioral: Negative.        Physical Exam     Initial Vitals [09/11/19 1337]   BP Pulse Resp Temp SpO2   (!) 162/104 71 16 96.4 °F (35.8 °C) 100 %      MAP       --         Physical Exam    Nursing note and vitals reviewed.  Constitutional: No distress.   HENT:   Head: Normocephalic and atraumatic.   Right Ear: External ear normal.   Left Ear: External ear normal.   Nose:  Nose normal.   Mouth/Throat: Oropharynx is clear and moist.   Eyes: Conjunctivae, EOM and lids are normal. Pupils are equal, round, and reactive to light.   Neck: Neck supple.   Cardiovascular: Normal rate, regular rhythm, S1 normal, S2 normal, normal heart sounds and intact distal pulses.   Pulmonary/Chest: Effort normal and breath sounds normal. No respiratory distress.   Abdominal: Soft. Bowel sounds are normal. There is tenderness in the epigastric area.   Musculoskeletal: Normal range of motion.   Neurological: She is alert and oriented to person, place, and time. She has normal strength. GCS eye subscore is 4. GCS verbal subscore is 5. GCS motor subscore is 6.   Skin: Skin is warm and dry. Capillary refill takes less than 2 seconds. No rash noted.   Psychiatric: She has a normal mood and affect. Her speech is normal and behavior is normal.         ED Course   Procedures  Labs Reviewed   CBC W/ AUTO DIFFERENTIAL - Abnormal; Notable for the following components:       Result Value    WBC 14.79 (*)     Mean Corpuscular Hemoglobin 25.9 (*)     Mean Corpuscular Hemoglobin Conc 31.6 (*)     Gran # (ANC) 10.5 (*)     All other components within normal limits   COMPREHENSIVE METABOLIC PANEL - Abnormal; Notable for the following components:    Potassium 3.2 (*)     Glucose 129 (*)     All other components within normal limits   URINALYSIS, REFLEX TO URINE CULTURE - Abnormal; Notable for the following components:    Leukocytes, UA Trace (*)     All other components within normal limits    Narrative:     Preferred Collection Type->Urine, Clean Catch   URINALYSIS MICROSCOPIC - Abnormal; Notable for the following components:    Bacteria Moderate (*)     All other components within normal limits    Narrative:     Preferred Collection Type->Urine, Clean Catch   LIPASE   PREGNANCY TEST, URINE RAPID   DRUG SCREEN PANEL, URINE EMERGENCY          Imaging Results          CT Abdomen Pelvis With Contrast (Final result)  Result  time 09/11/19 17:11:34    Final result by Anthony Robles MD (09/11/19 17:11:34)                 Impression:      No acute abnormality identified.  No bowel obstruction, intra-abdominal abscess, free fluid, or free air.    Moderate large volume stool cecum, right colon, transverse colon, splenic flexure colon, sigmoid.      Electronically signed by: Anthony Robles  Date:    09/11/2019  Time:    17:11             Narrative:    EXAMINATION:  CT ABDOMEN PELVIS WITH CONTRAST    CLINICAL HISTORY:  Abd pain, fever, abscess suspected;Nausea, vomiting, diarrhea;    TECHNIQUE:  Low dose axial images, sagittal and coronal reformations were obtained from the lung bases to the pubic symphysis following the IV administration of 75 mL of Omnipaque 350 .  Oral contrast was not administered.    COMPARISON:  04/04/2019    FINDINGS:  ABDOMEN:    - Lung bases: No infiltrates and no nodules.    - Liver: Nonspecific mild periportal edema.    - Gallbladder: No calcified gallstones.    - Bile Ducts: No evidence of intra or extra hepatic biliary ductal dilation.    - Spleen: Negative.    - Kidneys: No mass or hydronephrosis.    - Adrenals: Unremarkable.    - Pancreas: No mass or peripancreatic fat stranding.    - Retroperitoneum:  No significant adenopathy.    - Vascular: No abdominal aortic aneurysm.    - Abdominal wall:  Unremarkable.    PELVIS:    No pelvic mass, adenopathy, or free fluid.    BOWEL/MESENTERY:    No evidence of bowel obstruction or inflammation.  Moderate large volume stool cecum, right colon, transverse colon, splenic flexure colon, sigmoid.    BONES:  No acute osseous abnormality and no suspicious lytic or blastic lesion.    All CT scans at this facility use dose modulation, iterative reconstruction and/or weight based dosing when appropriate to reduce radiation dose to as low as reasonably achievable.                                     Medications   iohexol (OMNIPAQUE 350) injection 50 mL (has no administration in time  range)   sodium chloride 0.9% bolus 1,000 mL (0 mLs Intravenous Stopped 9/11/19 1436)   promethazine (PHENERGAN) 12.5 mg in dextrose 5 % 50 mL IVPB (0 mg Intravenous Stopped 9/11/19 1437)   ondansetron injection 4 mg (4 mg Intravenous Given 9/11/19 1521)   iohexol (OMNIPAQUE 350) injection 75 mL (75 mLs Intravenous Given 9/11/19 1656)                      ED Course as of Sep 11 1718   Wed Sep 11, 2019   1504 Patient reports some mild improvement to her symptoms.  Current abdominal pain is rated 6/10.  Epigastric tenderness is noted on exam.  Leukocytosis is noted.  Patient without additional episodes of vomiting since administration of Phenergan.  Patient's care was discussed with collaborating provider.  CT with contrast ordered.    [EW]      ED Course User Index  [EW] Caro Tang NP     Clinical Impression:       ICD-10-CM ICD-9-CM   1. Gastroenteritis K52.9 558.9     New Prescriptions    DICYCLOMINE (BENTYL) 20 MG TABLET    Take 1 tablet (20 mg total) by mouth 4 (four) times daily as needed (abdominal cramping). Take 30 mins before meals and then at bedtime.    ONDANSETRON (ZOFRAN-ODT) 4 MG TBDL    Take 1 tablet (4 mg total) by mouth every 8 (eight) hours as needed (nausea).       Disposition:   Disposition: Discharged  Condition: Stable    The patient acknowledges that close follow up with medical provider is required. Instructed to follow up with PCP within 2 days. Patient was given specific return precautions. The patient agrees to comply with all instruction and directions given in the ER.                       Caro Tang NP  09/11/19 2043

## 2019-09-11 NOTE — DISCHARGE INSTRUCTIONS
**Follow up with PCP in 24-48 hours. Return to ER with worsening of symptoms. Alsey diet.    **Over the counter tylenol or motrin as needed for pain and/or fever as directed on package insert. Drink plenty fluids. Get plenty rest. Wash hands frequently.     **Our goal in the emergency department is to always give you outstanding care and exceptional service. You may receive a survey by mail or e-mail in the next week regarding your experience in our ED. We would greatly appreciate your completing and returning the survey. Your feedback provides us with a way to recognize our staff who give very good care and it helps us learn how to improve when your experience was below our aspiration of excellence.

## 2019-09-24 ENCOUNTER — LAB VISIT (OUTPATIENT)
Dept: LAB | Facility: HOSPITAL | Age: 19
End: 2019-09-24
Attending: INTERNAL MEDICINE
Payer: MEDICAID

## 2019-09-24 DIAGNOSIS — B20 HIV, SYMPTOMATIC: ICD-10-CM

## 2019-09-24 LAB
ALBUMIN SERPL BCP-MCNC: 4.4 G/DL (ref 3.5–5.2)
ALP SERPL-CCNC: 66 U/L (ref 55–135)
ALT SERPL W/O P-5'-P-CCNC: 17 U/L (ref 10–44)
ANION GAP SERPL CALC-SCNC: 9 MMOL/L (ref 8–16)
AST SERPL-CCNC: 15 U/L (ref 10–40)
BASOPHILS # BLD AUTO: 0.02 K/UL (ref 0–0.2)
BASOPHILS NFR BLD: 0.2 % (ref 0–1.9)
BILIRUB SERPL-MCNC: 0.6 MG/DL (ref 0.1–1)
BUN SERPL-MCNC: 11 MG/DL (ref 6–20)
CALCIUM SERPL-MCNC: 9.8 MG/DL (ref 8.7–10.5)
CHLORIDE SERPL-SCNC: 106 MMOL/L (ref 95–110)
CO2 SERPL-SCNC: 25 MMOL/L (ref 23–29)
CREAT SERPL-MCNC: 0.8 MG/DL (ref 0.5–1.4)
DIFFERENTIAL METHOD: ABNORMAL
EOSINOPHIL # BLD AUTO: 0.1 K/UL (ref 0–0.5)
EOSINOPHIL NFR BLD: 0.8 % (ref 0–8)
ERYTHROCYTE [DISTWIDTH] IN BLOOD BY AUTOMATED COUNT: 13.2 % (ref 11.5–14.5)
EST. GFR  (AFRICAN AMERICAN): >60 ML/MIN/1.73 M^2
EST. GFR  (NON AFRICAN AMERICAN): >60 ML/MIN/1.73 M^2
GLUCOSE SERPL-MCNC: 60 MG/DL (ref 70–110)
HCT VFR BLD AUTO: 40.9 % (ref 37–48.5)
HGB BLD-MCNC: 13.2 G/DL (ref 12–16)
IMM GRANULOCYTES # BLD AUTO: 0.02 K/UL (ref 0–0.04)
IMM GRANULOCYTES NFR BLD AUTO: 0.2 % (ref 0–0.5)
LYMPHOCYTES # BLD AUTO: 3 K/UL (ref 1–4.8)
LYMPHOCYTES NFR BLD: 33.9 % (ref 18–48)
MCH RBC QN AUTO: 25.9 PG (ref 27–31)
MCHC RBC AUTO-ENTMCNC: 32.3 G/DL (ref 32–36)
MCV RBC AUTO: 80 FL (ref 82–98)
MONOCYTES # BLD AUTO: 0.6 K/UL (ref 0.3–1)
MONOCYTES NFR BLD: 7 % (ref 4–15)
NEUTROPHILS # BLD AUTO: 5.1 K/UL (ref 1.8–7.7)
NEUTROPHILS NFR BLD: 57.9 % (ref 38–73)
NRBC BLD-RTO: 0 /100 WBC
PLATELET # BLD AUTO: 224 K/UL (ref 150–350)
PMV BLD AUTO: 10 FL (ref 9.2–12.9)
POTASSIUM SERPL-SCNC: 3.9 MMOL/L (ref 3.5–5.1)
PROT SERPL-MCNC: 7.5 G/DL (ref 6–8.4)
RBC # BLD AUTO: 5.09 M/UL (ref 4–5.4)
SODIUM SERPL-SCNC: 140 MMOL/L (ref 136–145)
WBC # BLD AUTO: 8.84 K/UL (ref 3.9–12.7)

## 2019-09-24 PROCEDURE — 36415 COLL VENOUS BLD VENIPUNCTURE: CPT

## 2019-09-24 PROCEDURE — 87536 HIV-1 QUANT&REVRSE TRNSCRPJ: CPT

## 2019-09-24 PROCEDURE — 80053 COMPREHEN METABOLIC PANEL: CPT

## 2019-09-24 PROCEDURE — 85025 COMPLETE CBC W/AUTO DIFF WBC: CPT

## 2019-09-24 PROCEDURE — 86803 HEPATITIS C AB TEST: CPT

## 2019-09-24 PROCEDURE — 86480 TB TEST CELL IMMUN MEASURE: CPT

## 2019-09-24 PROCEDURE — 86361 T CELL ABSOLUTE COUNT: CPT

## 2019-09-24 PROCEDURE — 86592 SYPHILIS TEST NON-TREP QUAL: CPT

## 2019-09-25 LAB
CD3+CD4+ CELLS # BLD: 1029 CELLS/UL (ref 300–1400)
CD3+CD4+ CELLS NFR BLD: 35.3 % (ref 28–57)
HCV AB SERPL QL IA: NEGATIVE
RPR SER QL: NORMAL

## 2019-09-26 LAB
M TB IFN-G CD4+ BCKGRND COR BLD-ACNC: 0.01 IU/ML
MITOGEN IGNF BCKGRD COR BLD-ACNC: 9.48 IU/ML
MITOGEN IGNF BCKGRD COR BLD-ACNC: NEGATIVE [IU]/ML
NIL: 0.04 IU/ML
TB2 - NIL: 0.01 IU/ML

## 2019-09-27 LAB
HIV UQ DATE RECEIVED: NORMAL
HIV UQ DATE REPORTED: NORMAL
HIV1 RNA # SERPL NAA+PROBE: <40 COPIES/ML
HIV1 RNA SERPL NAA+PROBE-LOG#: <1.6 LOG (10) COPIES/ML
HIV1 RNA SERPL QL NAA+PROBE: NOT DETECTED

## 2019-10-31 PROBLEM — R63.4 WEIGHT LOSS: Status: ACTIVE | Noted: 2019-10-31

## 2021-05-06 ENCOUNTER — PATIENT MESSAGE (OUTPATIENT)
Dept: RESEARCH | Facility: HOSPITAL | Age: 21
End: 2021-05-06

## 2021-10-06 ENCOUNTER — HOSPITAL ENCOUNTER (EMERGENCY)
Facility: HOSPITAL | Age: 21
Discharge: HOME | End: 2021-10-06
Attending: SURGERY
Payer: MEDICAID

## 2021-10-06 VITALS
HEART RATE: 85 BPM | RESPIRATION RATE: 16 BRPM | BODY MASS INDEX: 25.3 KG/M2 | DIASTOLIC BLOOD PRESSURE: 79 MMHG | TEMPERATURE: 98 F | HEIGHT: 65 IN | WEIGHT: 151.88 LBS | OXYGEN SATURATION: 99 % | SYSTOLIC BLOOD PRESSURE: 132 MMHG

## 2021-10-06 DIAGNOSIS — N30.00 ACUTE CYSTITIS WITHOUT HEMATURIA: ICD-10-CM

## 2021-10-06 DIAGNOSIS — N76.0 ACUTE VAGINITIS: Primary | ICD-10-CM

## 2021-10-06 LAB
B-HCG UR QL: NEGATIVE
BACTERIA #/AREA URNS HPF: ABNORMAL /HPF
BILIRUB UR QL STRIP: ABNORMAL
CLARITY UR: CLEAR
COLOR UR: YELLOW
GLUCOSE UR QL STRIP: NEGATIVE
HGB UR QL STRIP: ABNORMAL
HYALINE CASTS #/AREA URNS LPF: 0 /LPF
KETONES UR QL STRIP: NEGATIVE
LEUKOCYTE ESTERASE UR QL STRIP: NEGATIVE
MICROSCOPIC COMMENT: ABNORMAL
NITRITE UR QL STRIP: NEGATIVE
PH UR STRIP: 6 [PH] (ref 5–8)
PROT UR QL STRIP: ABNORMAL
RBC #/AREA URNS HPF: 30 /HPF (ref 0–4)
SP GR UR STRIP: >=1.03 (ref 1–1.03)
URN SPEC COLLECT METH UR: ABNORMAL
UROBILINOGEN UR STRIP-ACNC: NEGATIVE EU/DL
WBC #/AREA URNS HPF: 32 /HPF (ref 0–5)
YEAST URNS QL MICRO: ABNORMAL

## 2021-10-06 PROCEDURE — 96372 THER/PROPH/DIAG INJ SC/IM: CPT

## 2021-10-06 PROCEDURE — 87086 URINE CULTURE/COLONY COUNT: CPT | Performed by: SURGERY

## 2021-10-06 PROCEDURE — 63700000 PHARM REV CODE 250 ALT 637 W/O HCPCS: Performed by: SURGERY

## 2021-10-06 PROCEDURE — 63600175 PHARM REV CODE 636 W HCPCS: Performed by: SURGERY

## 2021-10-06 PROCEDURE — 99284 EMERGENCY DEPT VISIT MOD MDM: CPT | Mod: 25

## 2021-10-06 PROCEDURE — 87088 URINE BACTERIA CULTURE: CPT | Performed by: SURGERY

## 2021-10-06 PROCEDURE — 87186 SC STD MICRODIL/AGAR DIL: CPT | Performed by: SURGERY

## 2021-10-06 PROCEDURE — 81000 URINALYSIS NONAUTO W/SCOPE: CPT | Performed by: SURGERY

## 2021-10-06 PROCEDURE — 87077 CULTURE AEROBIC IDENTIFY: CPT | Performed by: SURGERY

## 2021-10-06 PROCEDURE — 81025 URINE PREGNANCY TEST: CPT | Performed by: SURGERY

## 2021-10-06 RX ORDER — NITROFURANTOIN 25; 75 MG/1; MG/1
100 CAPSULE ORAL 2 TIMES DAILY
Qty: 14 CAPSULE | Refills: 0 | Status: SHIPPED | OUTPATIENT
Start: 2021-10-06 | End: 2021-10-13

## 2021-10-06 RX ORDER — CEFTRIAXONE 1 G/1
1 INJECTION, POWDER, FOR SOLUTION INTRAMUSCULAR; INTRAVENOUS ONCE
Status: COMPLETED | OUTPATIENT
Start: 2021-10-06 | End: 2021-10-06

## 2021-10-06 RX ORDER — CEFTRIAXONE 1 G/1
1 INJECTION, POWDER, FOR SOLUTION INTRAMUSCULAR; INTRAVENOUS ONCE
Status: DISCONTINUED | OUTPATIENT
Start: 2021-10-06 | End: 2021-10-06

## 2021-10-06 RX ORDER — METRONIDAZOLE 500 MG/1
500 TABLET ORAL 4 TIMES DAILY
Qty: 28 TABLET | Refills: 0 | Status: SHIPPED | OUTPATIENT
Start: 2021-10-06 | End: 2021-10-13

## 2021-10-06 RX ORDER — FLUCONAZOLE 100 MG/1
100 TABLET ORAL DAILY
Qty: 5 TABLET | Refills: 0 | Status: SHIPPED | OUTPATIENT
Start: 2021-10-06 | End: 2021-10-11

## 2021-10-06 RX ORDER — AZITHROMYCIN 250 MG/1
1000 TABLET, FILM COATED ORAL
Status: COMPLETED | OUTPATIENT
Start: 2021-10-06 | End: 2021-10-06

## 2021-10-06 RX ADMIN — CEFTRIAXONE SODIUM 1 G: 1 INJECTION, POWDER, FOR SOLUTION INTRAMUSCULAR; INTRAVENOUS at 12:10

## 2021-10-06 RX ADMIN — AZITHROMYCIN MONOHYDRATE 1000 MG: 250 TABLET ORAL at 12:10

## 2021-10-08 LAB — BACTERIA UR CULT: ABNORMAL

## 2022-12-01 ENCOUNTER — HOSPITAL ENCOUNTER (EMERGENCY)
Facility: HOSPITAL | Age: 22
Discharge: HOME | End: 2022-12-01
Attending: STUDENT IN AN ORGANIZED HEALTH CARE EDUCATION/TRAINING PROGRAM
Payer: MEDICAID

## 2022-12-01 VITALS
OXYGEN SATURATION: 100 % | DIASTOLIC BLOOD PRESSURE: 85 MMHG | SYSTOLIC BLOOD PRESSURE: 134 MMHG | HEART RATE: 110 BPM | RESPIRATION RATE: 16 BRPM | TEMPERATURE: 100 F

## 2022-12-01 DIAGNOSIS — J06.9 VIRAL URI WITH COUGH: Primary | ICD-10-CM

## 2022-12-01 LAB
B-HCG UR QL: NEGATIVE
GROUP A STREP, MOLECULAR: NEGATIVE
INFLUENZA A, MOLECULAR: NEGATIVE
INFLUENZA B, MOLECULAR: NEGATIVE
SARS-COV-2 RDRP RESP QL NAA+PROBE: NEGATIVE
SPECIMEN SOURCE: NORMAL

## 2022-12-01 PROCEDURE — 81025 URINE PREGNANCY TEST: CPT | Performed by: STUDENT IN AN ORGANIZED HEALTH CARE EDUCATION/TRAINING PROGRAM

## 2022-12-01 PROCEDURE — 99284 EMERGENCY DEPT VISIT MOD MDM: CPT

## 2022-12-01 PROCEDURE — 87651 STREP A DNA AMP PROBE: CPT | Performed by: STUDENT IN AN ORGANIZED HEALTH CARE EDUCATION/TRAINING PROGRAM

## 2022-12-01 PROCEDURE — 25000003 PHARM REV CODE 250: Performed by: STUDENT IN AN ORGANIZED HEALTH CARE EDUCATION/TRAINING PROGRAM

## 2022-12-01 PROCEDURE — U0002 COVID-19 LAB TEST NON-CDC: HCPCS | Performed by: STUDENT IN AN ORGANIZED HEALTH CARE EDUCATION/TRAINING PROGRAM

## 2022-12-01 PROCEDURE — 87502 INFLUENZA DNA AMP PROBE: CPT | Performed by: STUDENT IN AN ORGANIZED HEALTH CARE EDUCATION/TRAINING PROGRAM

## 2022-12-01 RX ORDER — BROMPHENIRAMINE MALEATE, PSEUDOEPHEDRINE HYDROCHLORIDE, AND DEXTROMETHORPHAN HYDROBROMIDE 2; 30; 10 MG/5ML; MG/5ML; MG/5ML
10 SYRUP ORAL 3 TIMES DAILY PRN
Qty: 90 ML | Refills: 0 | Status: SHIPPED | OUTPATIENT
Start: 2022-12-01 | End: 2022-12-11

## 2022-12-01 RX ORDER — ACETAMINOPHEN 325 MG/1
650 TABLET ORAL
Status: COMPLETED | OUTPATIENT
Start: 2022-12-01 | End: 2022-12-01

## 2022-12-01 RX ORDER — IBUPROFEN 600 MG/1
600 TABLET ORAL
Status: DISCONTINUED | OUTPATIENT
Start: 2022-12-01 | End: 2022-12-01 | Stop reason: HOSPADM

## 2022-12-01 RX ORDER — PREDNISONE 20 MG/1
40 TABLET ORAL DAILY
Qty: 10 TABLET | Refills: 0 | Status: SHIPPED | OUTPATIENT
Start: 2022-12-01 | End: 2022-12-06

## 2022-12-01 RX ADMIN — ACETAMINOPHEN 650 MG: 325 TABLET, COATED ORAL at 01:12

## 2022-12-01 NOTE — ED PROVIDER NOTES
Encounter Date: 2022       History     Chief Complaint   Patient presents with    Fever    Cough     Pt c/o cough and fever that started yesterday.     Chief complaint:  Cough sore throat  22-year-old female presents to the ED with reports of productive cough with green-yellow sputum for the last 3 days.  Also reports sore throat.  Denies difficulty swallowing.  She does report 2 episodes of nonbilious nonbloody vomiting after taking medications on an  stomach.  Denies any abdominal pain denies any diarrhea constipation.  Unsure of any recent ill contacts    Review of patient's allergies indicates:   Allergen Reactions    Aspirin Hives     Past Medical History:   Diagnosis Date    Herpes simplex virus (HSV) infection     HIV infection     HIV positive     Hx of tonsillectomy     Seizures      Past Surgical History:   Procedure Laterality Date     SECTION  2017    TONSILLECTOMY      tubes in ears       Family History   Problem Relation Age of Onset    Hypertension Mother     No Known Problems Father     No Known Problems Sister     No Known Problems Brother     No Known Problems Maternal Aunt     No Known Problems Maternal Uncle     No Known Problems Paternal Aunt     No Known Problems Paternal Uncle     No Known Problems Maternal Grandmother     No Known Problems Maternal Grandfather     No Known Problems Paternal Grandmother     No Known Problems Paternal Grandfather     ADD / ADHD Neg Hx     Alcohol abuse Neg Hx     Allergies Neg Hx     Asthma Neg Hx     Autism spectrum disorder Neg Hx     Behavior problems Neg Hx     Birth defects Neg Hx     Cancer Neg Hx     Chromosomal disorder Neg Hx     Cleft lip Neg Hx     Congenital heart disease Neg Hx     Depression Neg Hx     Diabetes Neg Hx     Early death Neg Hx     Eczema Neg Hx     Hearing loss Neg Hx     Heart disease Neg Hx     Hyperlipidemia Neg Hx     Kidney disease Neg Hx     Learning disabilities Neg Hx     Mental illness Neg Hx     Migraines  Neg Hx     Neurodegenerative disease Neg Hx     Obesity Neg Hx     Seizures Neg Hx     SIDS Neg Hx     Thyroid disease Neg Hx     Other Neg Hx     Breast cancer Neg Hx     Ovarian cancer Neg Hx      Social History     Tobacco Use    Smoking status: Never    Smokeless tobacco: Never   Substance Use Topics    Alcohol use: Not Currently     Comment: oss    Drug use: Not Currently     Types: Marijuana     Comment: last use 8/20/2016     Review of Systems   Constitutional:  Positive for chills and fever.   HENT:  Positive for congestion and sore throat. Negative for sinus pain.    Respiratory:  Positive for cough. Negative for shortness of breath.    Cardiovascular:  Negative for chest pain and palpitations.   Gastrointestinal:  Positive for nausea and vomiting. Negative for diarrhea.   Musculoskeletal:  Positive for myalgias.     Physical Exam     Initial Vitals [12/01/22 1316]   BP Pulse Resp Temp SpO2   134/85 110 16 100.2 °F (37.9 °C) 100 %      MAP       --         Physical Exam    Nursing note and vitals reviewed.  Constitutional: She appears well-developed and well-nourished.   HENT:   Head: Normocephalic and atraumatic.   Right Ear: External ear normal.   Left Ear: External ear normal.   Mouth/Throat: Oropharynx is clear and moist.   Neck: Neck supple.   Normal range of motion.  Cardiovascular:  Normal rate, regular rhythm and normal heart sounds.     Exam reveals no gallop and no friction rub.       No murmur heard.  Pulmonary/Chest: She has no wheezes. She has no rhonchi. She has no rales.   Musculoskeletal:         General: Normal range of motion.      Cervical back: Normal range of motion and neck supple.     Neurological: She is alert and oriented to person, place, and time.   Skin: Skin is warm and dry. Capillary refill takes less than 2 seconds.   Psychiatric: She has a normal mood and affect. Thought content normal.       ED Course   Procedures  Labs Reviewed   INFLUENZA A & B BY MOLECULAR   GROUP A  STREP, MOLECULAR   SARS-COV-2 RNA AMPLIFICATION, QUAL   PREGNANCY TEST, URINE RAPID    Narrative:     Specimen Source->Urine          Imaging Results    None          Medications   acetaminophen tablet 650 mg (650 mg Oral Given 22 1319)     Medical Decision Making:   Differential Diagnosis:   COVID, URI, influenza, strep, viral syndrome  Clinical Tests:   Lab Tests: Reviewed  ED Management:  Patient is well in appearance today   Vital signs stable  Afebrile    Physical exam exam unremarkable   Patient was swabbed in the ED for COVID, flu and strep and was negative  Patient also requesting pregnancy test which was also negative  Will DC with supportive care and follow-up with PCP   Given return precautions                            Clinical Impression:   Final diagnoses:  [J06.9] Viral URI with cough (Primary)          ED Disposition Condition    Discharge Stable          ED Prescriptions       Medication Sig Dispense Start Date End Date Auth. Provider    brompheniramine-pseudoeph-DM (BROMFED DM) 2-30-10 mg/5 mL Syrp () Take 10 mLs by mouth 3 (three) times daily as needed. 90 mL 2022 Jaquelin Angulo NP    predniSONE (DELTASONE) 20 MG tablet () Take 2 tablets (40 mg total) by mouth once daily. for 5 days 10 tablet 2022 Jaquelin Angulo NP          Follow-up Information    None          Jaquelin Angulo NP  22 1631       Jaquelin Angulo NP  22 1708       Jaquelin Angulo NP  23 0934

## 2022-12-01 NOTE — DISCHARGE INSTRUCTIONS
Please take medications as prescribed   May alternate Tylenol Motrin for fever   Please drink plenty of fluids   Follow-up with your primary care doctor in 1 week  Please return to the ED if you have new worrisome symptoms including shortness of breath increased fever or signs of dehydration

## 2022-12-18 ENCOUNTER — HOSPITAL ENCOUNTER (EMERGENCY)
Facility: HOSPITAL | Age: 22
Discharge: HOME | End: 2022-12-18
Attending: STUDENT IN AN ORGANIZED HEALTH CARE EDUCATION/TRAINING PROGRAM
Payer: MEDICAID

## 2022-12-18 VITALS
DIASTOLIC BLOOD PRESSURE: 92 MMHG | TEMPERATURE: 98 F | SYSTOLIC BLOOD PRESSURE: 143 MMHG | RESPIRATION RATE: 18 BRPM | BODY MASS INDEX: 25.28 KG/M2 | HEIGHT: 65 IN | HEART RATE: 107 BPM | OXYGEN SATURATION: 100 %

## 2022-12-18 DIAGNOSIS — R19.7 NAUSEA VOMITING AND DIARRHEA: ICD-10-CM

## 2022-12-18 DIAGNOSIS — R11.2 NAUSEA VOMITING AND DIARRHEA: ICD-10-CM

## 2022-12-18 DIAGNOSIS — Z34.90 EARLY STAGE OF PREGNANCY: Primary | ICD-10-CM

## 2022-12-18 DIAGNOSIS — R10.33 PERIUMBILICAL ABDOMINAL PAIN: ICD-10-CM

## 2022-12-18 LAB
ALBUMIN SERPL BCP-MCNC: 4.1 G/DL (ref 3.5–5.2)
ALP SERPL-CCNC: 50 U/L (ref 55–135)
ALT SERPL W/O P-5'-P-CCNC: 9 U/L (ref 10–44)
ANION GAP SERPL CALC-SCNC: 10 MMOL/L (ref 8–16)
AST SERPL-CCNC: 12 U/L (ref 10–40)
B-HCG UR QL: POSITIVE
BASOPHILS # BLD AUTO: 0.02 K/UL (ref 0–0.2)
BASOPHILS NFR BLD: 0.3 % (ref 0–1.9)
BILIRUB SERPL-MCNC: 0.4 MG/DL (ref 0.1–1)
BILIRUB UR QL STRIP: NEGATIVE
BUN SERPL-MCNC: 9 MG/DL (ref 6–20)
CALCIUM SERPL-MCNC: 9.1 MG/DL (ref 8.7–10.5)
CHLORIDE SERPL-SCNC: 107 MMOL/L (ref 95–110)
CLARITY UR: ABNORMAL
CO2 SERPL-SCNC: 20 MMOL/L (ref 23–29)
COLOR UR: YELLOW
CREAT SERPL-MCNC: 0.7 MG/DL (ref 0.5–1.4)
DIFFERENTIAL METHOD: ABNORMAL
EOSINOPHIL # BLD AUTO: 0 K/UL (ref 0–0.5)
EOSINOPHIL NFR BLD: 0.6 % (ref 0–8)
ERYTHROCYTE [DISTWIDTH] IN BLOOD BY AUTOMATED COUNT: 12.6 % (ref 11.5–14.5)
EST. GFR  (NO RACE VARIABLE): >60 ML/MIN/1.73 M^2
GLUCOSE SERPL-MCNC: 92 MG/DL (ref 70–110)
GLUCOSE UR QL STRIP: NEGATIVE
HCG INTACT+B SERPL-ACNC: NORMAL MIU/ML
HCT VFR BLD AUTO: 35.8 % (ref 37–48.5)
HGB BLD-MCNC: 12 G/DL (ref 12–16)
HGB UR QL STRIP: NEGATIVE
IMM GRANULOCYTES # BLD AUTO: 0.01 K/UL (ref 0–0.04)
IMM GRANULOCYTES NFR BLD AUTO: 0.1 % (ref 0–0.5)
KETONES UR QL STRIP: NEGATIVE
LEUKOCYTE ESTERASE UR QL STRIP: NEGATIVE
LIPASE SERPL-CCNC: 14 U/L (ref 4–60)
LYMPHOCYTES # BLD AUTO: 2.6 K/UL (ref 1–4.8)
LYMPHOCYTES NFR BLD: 35.4 % (ref 18–48)
MCH RBC QN AUTO: 26.3 PG (ref 27–31)
MCHC RBC AUTO-ENTMCNC: 33.5 G/DL (ref 32–36)
MCV RBC AUTO: 79 FL (ref 82–98)
MONOCYTES # BLD AUTO: 0.7 K/UL (ref 0.3–1)
MONOCYTES NFR BLD: 9 % (ref 4–15)
NEUTROPHILS # BLD AUTO: 4 K/UL (ref 1.8–7.7)
NEUTROPHILS NFR BLD: 54.6 % (ref 38–73)
NITRITE UR QL STRIP: NEGATIVE
NRBC BLD-RTO: 0 /100 WBC
PH UR STRIP: 6 [PH] (ref 5–8)
PLATELET # BLD AUTO: 200 K/UL (ref 150–450)
PMV BLD AUTO: 9.6 FL (ref 9.2–12.9)
POTASSIUM SERPL-SCNC: 3.2 MMOL/L (ref 3.5–5.1)
PROT SERPL-MCNC: 7.5 G/DL (ref 6–8.4)
PROT UR QL STRIP: NEGATIVE
RBC # BLD AUTO: 4.56 M/UL (ref 4–5.4)
SODIUM SERPL-SCNC: 137 MMOL/L (ref 136–145)
SP GR UR STRIP: >=1.03 (ref 1–1.03)
URN SPEC COLLECT METH UR: ABNORMAL
UROBILINOGEN UR STRIP-ACNC: NEGATIVE EU/DL
WBC # BLD AUTO: 7.24 K/UL (ref 3.9–12.7)

## 2022-12-18 PROCEDURE — 80053 COMPREHEN METABOLIC PANEL: CPT | Performed by: STUDENT IN AN ORGANIZED HEALTH CARE EDUCATION/TRAINING PROGRAM

## 2022-12-18 PROCEDURE — 81003 URINALYSIS AUTO W/O SCOPE: CPT | Performed by: STUDENT IN AN ORGANIZED HEALTH CARE EDUCATION/TRAINING PROGRAM

## 2022-12-18 PROCEDURE — 99283 EMERGENCY DEPT VISIT LOW MDM: CPT | Mod: 25

## 2022-12-18 PROCEDURE — 36415 COLL VENOUS BLD VENIPUNCTURE: CPT | Performed by: STUDENT IN AN ORGANIZED HEALTH CARE EDUCATION/TRAINING PROGRAM

## 2022-12-18 PROCEDURE — 85025 COMPLETE CBC W/AUTO DIFF WBC: CPT | Performed by: STUDENT IN AN ORGANIZED HEALTH CARE EDUCATION/TRAINING PROGRAM

## 2022-12-18 PROCEDURE — 83690 ASSAY OF LIPASE: CPT | Performed by: STUDENT IN AN ORGANIZED HEALTH CARE EDUCATION/TRAINING PROGRAM

## 2022-12-18 PROCEDURE — 84702 CHORIONIC GONADOTROPIN TEST: CPT | Performed by: STUDENT IN AN ORGANIZED HEALTH CARE EDUCATION/TRAINING PROGRAM

## 2022-12-18 PROCEDURE — 25000003 PHARM REV CODE 250: Performed by: STUDENT IN AN ORGANIZED HEALTH CARE EDUCATION/TRAINING PROGRAM

## 2022-12-18 PROCEDURE — 81025 URINE PREGNANCY TEST: CPT | Performed by: STUDENT IN AN ORGANIZED HEALTH CARE EDUCATION/TRAINING PROGRAM

## 2022-12-18 RX ORDER — PROMETHAZINE HYDROCHLORIDE 25 MG/1
25 TABLET ORAL EVERY 6 HOURS PRN
Qty: 15 TABLET | Refills: 0 | Status: SHIPPED | OUTPATIENT
Start: 2022-12-18 | End: 2023-01-03

## 2022-12-18 RX ORDER — ONDANSETRON 8 MG/1
8 TABLET, ORALLY DISINTEGRATING ORAL
Status: COMPLETED | OUTPATIENT
Start: 2022-12-18 | End: 2022-12-18

## 2022-12-18 RX ADMIN — ONDANSETRON 8 MG: 8 TABLET, ORALLY DISINTEGRATING ORAL at 08:12

## 2022-12-18 NOTE — ED TRIAGE NOTES
C/o lower abdominal/pelvic pain intermittently x 2 weeks. C/o vomiting every other day x 2 weeks. C/o diarrhea x a few days. Patient reports took 4 home pregnancy test which were all positive. Denies vaginal bleeding.

## 2022-12-18 NOTE — ED PROVIDER NOTES
Encounter Date: 2022    This document was partially completed using speech recognition software and may contain misspellings, grammatical errors, and/or unexpected word substitutions.       History     Chief Complaint   Patient presents with    Abdominal Pain    Vomiting    Nausea     22 year old female  with a PMHx of HIV, seizures, HSV presents to the ED with abdominal pain, vomiting for 2 weeks; diarrhea for a few days. She reports 4 home positive pregnancy tests as well. States the abdominal pain and vomiting has been intermittent and nonbloody. The diarrhea has also been nonbloody. No sick contacts, no suspicious foods, no vaginal bleeding.      Review of patient's allergies indicates:   Allergen Reactions    Aspirin Hives     Past Medical History:   Diagnosis Date    Herpes simplex virus (HSV) infection     HIV infection     HIV positive     Hx of tonsillectomy     Seizures      Past Surgical History:   Procedure Laterality Date     SECTION  2017    TONSILLECTOMY      tubes in ears       Family History   Problem Relation Age of Onset    Hypertension Mother     No Known Problems Father     No Known Problems Sister     No Known Problems Brother     No Known Problems Maternal Aunt     No Known Problems Maternal Uncle     No Known Problems Paternal Aunt     No Known Problems Paternal Uncle     No Known Problems Maternal Grandmother     No Known Problems Maternal Grandfather     No Known Problems Paternal Grandmother     No Known Problems Paternal Grandfather     ADD / ADHD Neg Hx     Alcohol abuse Neg Hx     Allergies Neg Hx     Asthma Neg Hx     Autism spectrum disorder Neg Hx     Behavior problems Neg Hx     Birth defects Neg Hx     Cancer Neg Hx     Chromosomal disorder Neg Hx     Cleft lip Neg Hx     Congenital heart disease Neg Hx     Depression Neg Hx     Diabetes Neg Hx     Early death Neg Hx     Eczema Neg Hx     Hearing loss Neg Hx     Heart disease Neg Hx     Hyperlipidemia Neg Hx      Kidney disease Neg Hx     Learning disabilities Neg Hx     Mental illness Neg Hx     Migraines Neg Hx     Neurodegenerative disease Neg Hx     Obesity Neg Hx     Seizures Neg Hx     SIDS Neg Hx     Thyroid disease Neg Hx     Other Neg Hx     Breast cancer Neg Hx     Ovarian cancer Neg Hx      Social History     Tobacco Use    Smoking status: Never    Smokeless tobacco: Never   Substance Use Topics    Alcohol use: Not Currently     Comment: oss    Drug use: Not Currently     Types: Marijuana     Comment: last use 8/20/2016     Review of Systems   Constitutional:  Negative for chills and fever.   HENT:  Negative for congestion, rhinorrhea and sneezing.    Eyes:  Negative for discharge and redness.   Respiratory:  Negative for cough and shortness of breath.    Cardiovascular:  Negative for chest pain and palpitations.   Gastrointestinal:  Positive for abdominal pain, nausea and vomiting. Negative for diarrhea.   Genitourinary:  Negative for dysuria, frequency, vaginal bleeding and vaginal discharge.   Musculoskeletal:  Negative for back pain and neck pain.   Skin:  Negative for rash and wound.   Neurological:  Negative for weakness, numbness and headaches.     Physical Exam     Initial Vitals [12/18/22 0812]   BP Pulse Resp Temp SpO2   (!) 173/102 107 18 98.2 °F (36.8 °C) 100 %      MAP       --         Physical Exam    Nursing note and vitals reviewed.  Constitutional: She appears well-developed. She is not diaphoretic. No distress.   HENT:   Head: Normocephalic and atraumatic.   Right Ear: External ear normal.   Left Ear: External ear normal.   Eyes: Right eye exhibits no discharge. Left eye exhibits no discharge. No scleral icterus.   Neck: Neck supple.   Cardiovascular:  Normal rate and regular rhythm.           Pulmonary/Chest: Breath sounds normal. No stridor. No respiratory distress. She has no wheezes. She has no rhonchi. She has no rales.   Abdominal: Abdomen is soft. There is no abdominal tenderness.  There is no guarding.   Musculoskeletal:         General: No edema.      Cervical back: Neck supple.     Neurological: She is alert and oriented to person, place, and time.   Skin: Skin is warm and dry. Capillary refill takes less than 2 seconds.   Psychiatric: She has a normal mood and affect.       ED Course   Procedures  Labs Reviewed   PREGNANCY TEST, URINE RAPID - Abnormal; Notable for the following components:       Result Value    Preg Test, Ur Positive (*)     All other components within normal limits    Narrative:     Specimen Source->Urine   URINALYSIS, REFLEX TO URINE CULTURE - Abnormal; Notable for the following components:    Appearance, UA Hazy (*)     Specific Gravity, UA >=1.030 (*)     All other components within normal limits    Narrative:     Specimen Source->Urine   CBC W/ AUTO DIFFERENTIAL - Abnormal; Notable for the following components:    Hematocrit 35.8 (*)     MCV 79 (*)     MCH 26.3 (*)     All other components within normal limits    Narrative:     Release to patient->Immediate   COMPREHENSIVE METABOLIC PANEL - Abnormal; Notable for the following components:    Potassium 3.2 (*)     CO2 20 (*)     Alkaline Phosphatase 50 (*)     ALT 9 (*)     All other components within normal limits    Narrative:     Release to patient->Immediate   LIPASE    Narrative:     Release to patient->Immediate   HCG, QUANTITATIVE    Narrative:     Release to patient->Immediate          Imaging Results    None          Medications   ondansetron disintegrating tablet 8 mg (8 mg Oral Given 12/18/22 0851)     Medical Decision Making:   Differential Diagnosis:   Ddx: gastroenteritis, peritonitis, ectopic, colitis, appendicitis, pyelonephritis, UTI, ectopic  ED Management:  Based on the patient's evaluation - patient is pregnant and experiencing nausea/vomiting/diarrhea. B-HCG 00476 and I wanted to obtain an US to assess for an intrauterine pregnancy, especially since she has lower abdominal pain. However, patient  does not want to wait to call out US (US is on call). She wants to go home and follow up with her OBGYN, Dr. Domingo. She understands we haven't assessed for a safe intrauterine pregnancy (rule out ectopic) but states she will return to the ED for an US if her abdominal pain worsens. Discharging home with close OBGYN f/u.                        Clinical Impression:   Final diagnoses:  [Z34.90] Early stage of pregnancy (Primary)  [R10.33] Periumbilical abdominal pain  [R11.2, R19.7] Nausea vomiting and diarrhea        ED Disposition Condition    Discharge Stable          ED Prescriptions       Medication Sig Dispense Start Date End Date Auth. Provider    promethazine (PHENERGAN) 25 MG tablet Take 1 tablet (25 mg total) by mouth every 6 (six) hours as needed for Nausea. 15 tablet 12/18/2022 -- Wade Thompson DO          Follow-up Information       Follow up With Specialties Details Why Contact Info    Anna Domingo MD Obstetrics and Gynecology Schedule an appointment as soon as possible for a visit in 5 days  [address] [place] [address]  [phone]               Wade Thompson DO  12/18/22 2744

## 2022-12-20 ENCOUNTER — PATIENT OUTREACH (OUTPATIENT)
Dept: EMERGENCY MEDICINE | Facility: HOSPITAL | Age: 22
End: 2022-12-20
Payer: MEDICAID

## 2022-12-21 ENCOUNTER — HOSPITAL ENCOUNTER (EMERGENCY)
Facility: HOSPITAL | Age: 22
Discharge: HOME | End: 2022-12-21
Attending: SURGERY
Payer: MEDICAID

## 2022-12-21 ENCOUNTER — TELEPHONE (OUTPATIENT)
Dept: OBSTETRICS AND GYNECOLOGY | Facility: CLINIC | Age: 22
End: 2022-12-21
Payer: MEDICAID

## 2022-12-21 VITALS
SYSTOLIC BLOOD PRESSURE: 154 MMHG | RESPIRATION RATE: 18 BRPM | HEART RATE: 109 BPM | DIASTOLIC BLOOD PRESSURE: 85 MMHG | TEMPERATURE: 99 F | WEIGHT: 136 LBS | BODY MASS INDEX: 22.64 KG/M2 | OXYGEN SATURATION: 98 %

## 2022-12-21 DIAGNOSIS — R10.9 ABDOMINAL PAIN AFFECTING PREGNANCY: Primary | ICD-10-CM

## 2022-12-21 DIAGNOSIS — O26.899 ABDOMINAL PAIN AFFECTING PREGNANCY: Primary | ICD-10-CM

## 2022-12-21 LAB
ALBUMIN SERPL BCP-MCNC: 4 G/DL (ref 3.5–5.2)
ALP SERPL-CCNC: 46 U/L (ref 55–135)
ALT SERPL W/O P-5'-P-CCNC: 9 U/L (ref 10–44)
ANION GAP SERPL CALC-SCNC: 9 MMOL/L (ref 8–16)
AST SERPL-CCNC: 12 U/L (ref 10–40)
BASOPHILS # BLD AUTO: 0.01 K/UL (ref 0–0.2)
BASOPHILS NFR BLD: 0.1 % (ref 0–1.9)
BILIRUB SERPL-MCNC: 0.5 MG/DL (ref 0.1–1)
BILIRUB UR QL STRIP: NEGATIVE
BUN SERPL-MCNC: 6 MG/DL (ref 6–20)
CALCIUM SERPL-MCNC: 9.1 MG/DL (ref 8.7–10.5)
CHLORIDE SERPL-SCNC: 105 MMOL/L (ref 95–110)
CLARITY UR: ABNORMAL
CO2 SERPL-SCNC: 21 MMOL/L (ref 23–29)
COLOR UR: YELLOW
CREAT SERPL-MCNC: 0.7 MG/DL (ref 0.5–1.4)
DIFFERENTIAL METHOD: ABNORMAL
EOSINOPHIL # BLD AUTO: 0 K/UL (ref 0–0.5)
EOSINOPHIL NFR BLD: 0.5 % (ref 0–8)
ERYTHROCYTE [DISTWIDTH] IN BLOOD BY AUTOMATED COUNT: 12.4 % (ref 11.5–14.5)
EST. GFR  (NO RACE VARIABLE): >60 ML/MIN/1.73 M^2
GLUCOSE SERPL-MCNC: 86 MG/DL (ref 70–110)
GLUCOSE UR QL STRIP: NEGATIVE
HCG INTACT+B SERPL-ACNC: NORMAL MIU/ML
HCT VFR BLD AUTO: 34.4 % (ref 37–48.5)
HGB BLD-MCNC: 11.5 G/DL (ref 12–16)
HGB UR QL STRIP: ABNORMAL
IMM GRANULOCYTES # BLD AUTO: 0.01 K/UL (ref 0–0.04)
IMM GRANULOCYTES NFR BLD AUTO: 0.1 % (ref 0–0.5)
KETONES UR QL STRIP: NEGATIVE
LEUKOCYTE ESTERASE UR QL STRIP: NEGATIVE
LIPASE SERPL-CCNC: 6 U/L (ref 4–60)
LYMPHOCYTES # BLD AUTO: 2.3 K/UL (ref 1–4.8)
LYMPHOCYTES NFR BLD: 31.1 % (ref 18–48)
MCH RBC QN AUTO: 26 PG (ref 27–31)
MCHC RBC AUTO-ENTMCNC: 33.4 G/DL (ref 32–36)
MCV RBC AUTO: 78 FL (ref 82–98)
MONOCYTES # BLD AUTO: 0.7 K/UL (ref 0.3–1)
MONOCYTES NFR BLD: 9.1 % (ref 4–15)
NEUTROPHILS # BLD AUTO: 4.3 K/UL (ref 1.8–7.7)
NEUTROPHILS NFR BLD: 59.1 % (ref 38–73)
NITRITE UR QL STRIP: NEGATIVE
NRBC BLD-RTO: 0 /100 WBC
PH UR STRIP: 6 [PH] (ref 5–8)
PLATELET # BLD AUTO: 186 K/UL (ref 150–450)
PMV BLD AUTO: 9.9 FL (ref 9.2–12.9)
POTASSIUM SERPL-SCNC: 3 MMOL/L (ref 3.5–5.1)
PROT SERPL-MCNC: 7.3 G/DL (ref 6–8.4)
PROT UR QL STRIP: ABNORMAL
RBC # BLD AUTO: 4.42 M/UL (ref 4–5.4)
SODIUM SERPL-SCNC: 135 MMOL/L (ref 136–145)
SP GR UR STRIP: >=1.03 (ref 1–1.03)
URN SPEC COLLECT METH UR: ABNORMAL
UROBILINOGEN UR STRIP-ACNC: NEGATIVE EU/DL
WBC # BLD AUTO: 7.33 K/UL (ref 3.9–12.7)

## 2022-12-21 PROCEDURE — 81003 URINALYSIS AUTO W/O SCOPE: CPT | Performed by: SURGERY

## 2022-12-21 PROCEDURE — 99284 EMERGENCY DEPT VISIT MOD MDM: CPT | Mod: 25

## 2022-12-21 PROCEDURE — 80053 COMPREHEN METABOLIC PANEL: CPT | Performed by: SURGERY

## 2022-12-21 PROCEDURE — 83690 ASSAY OF LIPASE: CPT | Performed by: SURGERY

## 2022-12-21 PROCEDURE — 84702 CHORIONIC GONADOTROPIN TEST: CPT | Performed by: SURGERY

## 2022-12-21 PROCEDURE — 85025 COMPLETE CBC W/AUTO DIFF WBC: CPT | Performed by: SURGERY

## 2022-12-21 PROCEDURE — 36415 COLL VENOUS BLD VENIPUNCTURE: CPT | Performed by: SURGERY

## 2022-12-21 NOTE — ED PROVIDER NOTES
Encounter Date: 2022       History     Chief Complaint   Patient presents with    Abdominal Pain     Pt reports pain to middle of abdomen onset today  Pt does report she is pregnant      Nidhi Willis is a 22 y.o. female with PMH of HIV who presents to the ED for evaluation of abdominal pain.  Patient reports that she recently found out that she was pregnant, LMP 2022.  She developed mid abdominal pain today that she describes as aching, currently 2/10 in severity.  She believes that she may be constipated.  She denies abdominal cramping, vaginal bleeding, leakage, or discharge.  Denies urinary symptoms.  Denies fever, chills, or body aches.  , followed by Dr. Domingo.    The history is provided by the patient.   Review of patient's allergies indicates:   Allergen Reactions    Aspirin Hives     Past Medical History:   Diagnosis Date    Herpes simplex virus (HSV) infection     HIV infection     HIV positive     Hx of tonsillectomy     Seizures      Past Surgical History:   Procedure Laterality Date     SECTION  2017    TONSILLECTOMY      tubes in ears       Family History   Problem Relation Age of Onset    Hypertension Mother     No Known Problems Father     No Known Problems Sister     No Known Problems Brother     No Known Problems Maternal Aunt     No Known Problems Maternal Uncle     No Known Problems Paternal Aunt     No Known Problems Paternal Uncle     No Known Problems Maternal Grandmother     No Known Problems Maternal Grandfather     No Known Problems Paternal Grandmother     No Known Problems Paternal Grandfather     ADD / ADHD Neg Hx     Alcohol abuse Neg Hx     Allergies Neg Hx     Asthma Neg Hx     Autism spectrum disorder Neg Hx     Behavior problems Neg Hx     Birth defects Neg Hx     Cancer Neg Hx     Chromosomal disorder Neg Hx     Cleft lip Neg Hx     Congenital heart disease Neg Hx     Depression Neg Hx     Diabetes Neg Hx     Early death Neg Hx     Eczema  Neg Hx     Hearing loss Neg Hx     Heart disease Neg Hx     Hyperlipidemia Neg Hx     Kidney disease Neg Hx     Learning disabilities Neg Hx     Mental illness Neg Hx     Migraines Neg Hx     Neurodegenerative disease Neg Hx     Obesity Neg Hx     Seizures Neg Hx     SIDS Neg Hx     Thyroid disease Neg Hx     Other Neg Hx     Breast cancer Neg Hx     Ovarian cancer Neg Hx      Social History     Tobacco Use    Smoking status: Never    Smokeless tobacco: Never   Substance Use Topics    Alcohol use: Not Currently     Comment: oss    Drug use: Not Currently     Types: Marijuana     Comment: last use 8/20/2016     Review of Systems   Constitutional:  Negative for fever.   HENT:  Negative for sore throat.    Respiratory:  Negative for chest tightness and shortness of breath.    Cardiovascular:  Negative for chest pain.   Gastrointestinal:  Positive for abdominal pain. Negative for nausea.   Genitourinary:  Negative for dysuria, frequency and urgency.   Musculoskeletal:  Negative for back pain.   Skin:  Negative for rash.   Neurological:  Negative for dizziness, weakness, light-headedness and numbness.   Hematological:  Does not bruise/bleed easily.     Physical Exam     Initial Vitals [12/21/22 1144]   BP Pulse Resp Temp SpO2   129/85 101 18 99.3 °F (37.4 °C) 98 %      MAP       --         Physical Exam    Nursing note and vitals reviewed.  Constitutional: She appears well-developed and well-nourished.   HENT:   Head: Normocephalic and atraumatic.   Right Ear: Tympanic membrane, external ear and ear canal normal. Tympanic membrane is not erythematous. No middle ear effusion.   Left Ear: Tympanic membrane, external ear and ear canal normal. Tympanic membrane is not erythematous.  No middle ear effusion.   Nose: Nose normal.   Mouth/Throat: Uvula is midline, oropharynx is clear and moist and mucous membranes are normal. Mucous membranes are not pale and not dry.   Eyes: Conjunctivae and EOM are normal. Pupils are equal,  round, and reactive to light.   Neck: Neck supple.   Normal range of motion.  Cardiovascular:  Normal rate, regular rhythm, normal heart sounds and intact distal pulses.           Pulmonary/Chest: Effort normal and breath sounds normal. She has no decreased breath sounds. She has no wheezes. She has no rhonchi. She has no rales.   Abdominal: Abdomen is soft. Bowel sounds are normal. There is abdominal tenderness in the periumbilical area.   Musculoskeletal:         General: Normal range of motion.      Cervical back: Normal range of motion and neck supple.     Neurological: She is alert and oriented to person, place, and time. She has normal strength. She displays normal reflexes. No cranial nerve deficit or sensory deficit.   Skin: Skin is warm and dry. Capillary refill takes less than 2 seconds. No rash noted.   Psychiatric: She has a normal mood and affect. Her behavior is normal. Judgment and thought content normal.       ED Course   Procedures  Labs Reviewed   CBC W/ AUTO DIFFERENTIAL - Abnormal; Notable for the following components:       Result Value    Hemoglobin 11.5 (*)     Hematocrit 34.4 (*)     MCV 78 (*)     MCH 26.0 (*)     All other components within normal limits   COMPREHENSIVE METABOLIC PANEL - Abnormal; Notable for the following components:    Sodium 135 (*)     Potassium 3.0 (*)     CO2 21 (*)     Alkaline Phosphatase 46 (*)     ALT 9 (*)     All other components within normal limits   URINALYSIS, REFLEX TO URINE CULTURE - Abnormal; Notable for the following components:    Appearance, UA Hazy (*)     Specific Gravity, UA >=1.030 (*)     Protein, UA Trace (*)     Occult Blood UA Trace (*)     All other components within normal limits    Narrative:     Specimen Source->Urine   LIPASE   HCG, QUANTITATIVE          Imaging Results              US OB Transvaginal (Final result)  Result time 12/21/22 12:32:49   Procedure changed from US OB <14 Wks, TransAbd, Single Gestation     Final result by Daina  CAESAR Chavez MD (22 12:32:49)                   Impression:      Single live intrauterine pregnancy with estimated gestational age of 6 weeks 1 days.      Electronically signed by: Daina Chavez MD  Date:    2022  Time:    12:32               Narrative:    EXAMINATION:  US OB TRANSVAGINAL    CLINICAL HISTORY:  abd pain; beta 2 days ago was 10,000;    TECHNIQUE:  Transabdominal sonography of the pelvis was performed, followed by transvaginal sonography to better evaluate the uterus and ovaries.    COMPARISON:  None.    FINDINGS:  The uterus measures 9.7 x 4.9 x 6.3 cm.  A single gestational sac is seen within the uterus. The sac contains both a yolk sac and an embryo. The crown-rump length measures 4.4 mm corresponding to gestational age of 6 weeks 1 day.  Cardiac motion is present with a regular heart rate of 114 bpm.    The ovaries are normal in size and sonographic appearance.  There are normal arterial and venous waveforms in both ovaries.    No free fluid is seen in the cul-de-sac.                                       Medications - No data to display  Medical Decision Makin-year-old female presents with abdominal cramping in the setting of pregnancy  Patient ultimately states that she wants an ultrasound to evaluate the baby today  Normal lab work, no urinary tract infection, last beta-hCG greater than 10,000  6 week IUP on ultrasound today with no obvious signs of complications on report  Hydrate at home, Tylenol for pain with OBGYN follow-up in next 48 hours on DC  Return to ER with any concerning signs symptoms after discharge this afternoon                        Clinical Impression:   Final diagnoses:  [O26.899, R10.9] Abdominal pain affecting pregnancy (Primary)        ED Disposition Condition    Discharge Stable          ED Prescriptions    None       Follow-up Information       Follow up With Specialties Details Why Contact Info    Anna Domingo MD Obstetrics and Gynecology Go  in 2 days  [address] [place] [address] [address] [id number]  [phone]               Abelardo Simpson MD  12/21/22 6677

## 2022-12-21 NOTE — TELEPHONE ENCOUNTER
----- Message from Shasta Felix MA sent at 12/21/2022  1:43 PM CST -----  Regarding: self  Nidhi Willis  MRN: [id number]  Home Phone      [phone]  Work Phone      Not on file.  Mobile          [phone]    Patient Care Team:  Primary Doctor No as PCP - General  Fanny Lozano MD as PCP - Infectious Disease (Infectious Diseases)  Anna Domingo MD as Consulting Physician (Obstetrics and Gynecology)  Lisa Gutierrez, Patient Care Assistant as ED Navigator  OB? Yes, Unknown  What phone number can you be reached at? [phone]  Message:     Pt would like to schedule annual/dx preg appt. Please return call @ [phone]. Thanks.

## 2022-12-21 NOTE — ED TRIAGE NOTES
22 y.o. female presents to ER   Chief Complaint   Patient presents with    Abdominal Pain     Pt reports pain to middle of abdomen onset today  Pt does report she is pregnant    Pt states she has not had a BM for about 3 days. Denies vaginal bleeding, states just a peach tinged discharge present. Pt awaiting first OB appt for pregnancy. No acute distress noted.     What Type Of Note Output Would You Prefer (Optional)?: Standard Output On A Scale Of 0 To 10 How Would You Rate Your Itching?: 6 How Did Your Itching Occur?: sudden in onset (over a period of weeks to a few months) How Severe Is Your Itching?: mild

## 2022-12-21 NOTE — TELEPHONE ENCOUNTER
Pt called back and appt for dx pregnancy made for 12/30/22 @ 10:45 with Dr. Domingo. Pt voiced understanding.

## 2022-12-27 NOTE — PROGRESS NOTES
ED Navigator attempted to contact patient on 3 or more separate occasions, patient is unable to reach. ED Navigator to close encounter at this time.     Lisa Gutierrez  ED Navigator- [place]  [phone]

## 2023-01-03 ENCOUNTER — LAB VISIT (OUTPATIENT)
Dept: LAB | Facility: HOSPITAL | Age: 23
End: 2023-01-03
Attending: INTERNAL MEDICINE
Payer: MEDICAID

## 2023-01-03 ENCOUNTER — OFFICE VISIT (OUTPATIENT)
Dept: OBSTETRICS AND GYNECOLOGY | Facility: CLINIC | Age: 23
End: 2023-01-03
Payer: MEDICAID

## 2023-01-03 VITALS
BODY MASS INDEX: 24.72 KG/M2 | WEIGHT: 148.38 LBS | HEART RATE: 88 BPM | DIASTOLIC BLOOD PRESSURE: 82 MMHG | RESPIRATION RATE: 16 BRPM | HEIGHT: 65 IN | SYSTOLIC BLOOD PRESSURE: 130 MMHG

## 2023-01-03 DIAGNOSIS — Z32.01 PREGNANCY EXAMINATION OR TEST, POSITIVE RESULT: Primary | ICD-10-CM

## 2023-01-03 DIAGNOSIS — B20 HIV, SYMPTOMATIC: ICD-10-CM

## 2023-01-03 DIAGNOSIS — Z12.4 SCREENING FOR CERVICAL CANCER: ICD-10-CM

## 2023-01-03 DIAGNOSIS — Z32.01 PREGNANCY EXAMINATION OR TEST, POSITIVE RESULT: ICD-10-CM

## 2023-01-03 DIAGNOSIS — O21.9 NAUSEA AND VOMITING IN PREGNANCY: ICD-10-CM

## 2023-01-03 DIAGNOSIS — O98.719 HIV AFFECTING PREGNANCY, ANTEPARTUM: ICD-10-CM

## 2023-01-03 LAB
ABO + RH BLD: NORMAL
ALBUMIN SERPL BCP-MCNC: 3.7 G/DL (ref 3.5–5.2)
ALBUMIN/CREAT UR: 6 UG/MG (ref 0–30)
ALP SERPL-CCNC: 42 U/L (ref 55–135)
ALT SERPL W/O P-5'-P-CCNC: 11 U/L (ref 10–44)
ANION GAP SERPL CALC-SCNC: 9 MMOL/L (ref 8–16)
AST SERPL-CCNC: 13 U/L (ref 10–40)
BASOPHILS # BLD AUTO: 0.01 K/UL (ref 0–0.2)
BASOPHILS NFR BLD: 0.1 % (ref 0–1.9)
BILIRUB SERPL-MCNC: 0.3 MG/DL (ref 0.1–1)
BLD GP AB SCN CELLS X3 SERPL QL: NORMAL
BUN SERPL-MCNC: 4 MG/DL (ref 6–20)
CALCIUM SERPL-MCNC: 9 MG/DL (ref 8.7–10.5)
CHLORIDE SERPL-SCNC: 105 MMOL/L (ref 95–110)
CO2 SERPL-SCNC: 23 MMOL/L (ref 23–29)
CREAT SERPL-MCNC: 0.7 MG/DL (ref 0.5–1.4)
CREAT UR-MCNC: 166.2 MG/DL (ref 15–325)
DIFFERENTIAL METHOD: ABNORMAL
EOSINOPHIL # BLD AUTO: 0.1 K/UL (ref 0–0.5)
EOSINOPHIL NFR BLD: 0.6 % (ref 0–8)
ERYTHROCYTE [DISTWIDTH] IN BLOOD BY AUTOMATED COUNT: 12.6 % (ref 11.5–14.5)
EST. GFR  (NO RACE VARIABLE): >60 ML/MIN/1.73 M^2
GLUCOSE SERPL-MCNC: 84 MG/DL (ref 70–110)
HCT VFR BLD AUTO: 35.8 % (ref 37–48.5)
HGB BLD-MCNC: 11.7 G/DL (ref 12–16)
IMM GRANULOCYTES # BLD AUTO: 0.02 K/UL (ref 0–0.04)
IMM GRANULOCYTES NFR BLD AUTO: 0.2 % (ref 0–0.5)
LYMPHOCYTES # BLD AUTO: 2.5 K/UL (ref 1–4.8)
LYMPHOCYTES NFR BLD: 27.8 % (ref 18–48)
MCH RBC QN AUTO: 26.1 PG (ref 27–31)
MCHC RBC AUTO-ENTMCNC: 32.7 G/DL (ref 32–36)
MCV RBC AUTO: 80 FL (ref 82–98)
MICROALBUMIN UR DL<=1MG/L-MCNC: 10 UG/ML
MONOCYTES # BLD AUTO: 0.7 K/UL (ref 0.3–1)
MONOCYTES NFR BLD: 7.9 % (ref 4–15)
NEUTROPHILS # BLD AUTO: 5.6 K/UL (ref 1.8–7.7)
NEUTROPHILS NFR BLD: 63.4 % (ref 38–73)
NRBC BLD-RTO: 0 /100 WBC
PLATELET # BLD AUTO: 190 K/UL (ref 150–450)
PMV BLD AUTO: 9.8 FL (ref 9.2–12.9)
POTASSIUM SERPL-SCNC: 3.4 MMOL/L (ref 3.5–5.1)
PROT SERPL-MCNC: 6.9 G/DL (ref 6–8.4)
RBC # BLD AUTO: 4.49 M/UL (ref 4–5.4)
SODIUM SERPL-SCNC: 137 MMOL/L (ref 136–145)
WBC # BLD AUTO: 8.81 K/UL (ref 3.9–12.7)

## 2023-01-03 PROCEDURE — 85025 COMPLETE CBC W/AUTO DIFF WBC: CPT | Performed by: INTERNAL MEDICINE

## 2023-01-03 PROCEDURE — 1159F PR MEDICATION LIST DOCUMENTED IN MEDICAL RECORD: ICD-10-PCS | Mod: CPTII,,, | Performed by: OBSTETRICS & GYNECOLOGY

## 2023-01-03 PROCEDURE — 99999 PR PBB SHADOW E&M-EST. PATIENT-LVL III: ICD-10-PCS | Mod: PBBFAC,,, | Performed by: OBSTETRICS & GYNECOLOGY

## 2023-01-03 PROCEDURE — 3079F PR MOST RECENT DIASTOLIC BLOOD PRESSURE 80-89 MM HG: ICD-10-PCS | Mod: CPTII,,, | Performed by: OBSTETRICS & GYNECOLOGY

## 2023-01-03 PROCEDURE — 1159F MED LIST DOCD IN RCRD: CPT | Mod: CPTII,,, | Performed by: OBSTETRICS & GYNECOLOGY

## 2023-01-03 PROCEDURE — 86361 T CELL ABSOLUTE COUNT: CPT | Performed by: INTERNAL MEDICINE

## 2023-01-03 PROCEDURE — 86762 RUBELLA ANTIBODY: CPT | Performed by: OBSTETRICS & GYNECOLOGY

## 2023-01-03 PROCEDURE — 86592 SYPHILIS TEST NON-TREP QUAL: CPT | Performed by: INTERNAL MEDICINE

## 2023-01-03 PROCEDURE — 99999 PR PBB SHADOW E&M-EST. PATIENT-LVL III: CPT | Mod: PBBFAC,,, | Performed by: OBSTETRICS & GYNECOLOGY

## 2023-01-03 PROCEDURE — 3008F PR BODY MASS INDEX (BMI) DOCUMENTED: ICD-10-PCS | Mod: CPTII,,, | Performed by: OBSTETRICS & GYNECOLOGY

## 2023-01-03 PROCEDURE — 3008F BODY MASS INDEX DOCD: CPT | Mod: CPTII,,, | Performed by: OBSTETRICS & GYNECOLOGY

## 2023-01-03 PROCEDURE — 82570 ASSAY OF URINE CREATININE: CPT | Performed by: INTERNAL MEDICINE

## 2023-01-03 PROCEDURE — 87901 NFCT AGT GNTYP ALYS HIV1 REV: CPT | Performed by: INTERNAL MEDICINE

## 2023-01-03 PROCEDURE — 87340 HEPATITIS B SURFACE AG IA: CPT | Performed by: OBSTETRICS & GYNECOLOGY

## 2023-01-03 PROCEDURE — 87591 N.GONORRHOEAE DNA AMP PROB: CPT | Performed by: OBSTETRICS & GYNECOLOGY

## 2023-01-03 PROCEDURE — 99204 OFFICE O/P NEW MOD 45 MIN: CPT | Mod: S$PBB,TH,, | Performed by: OBSTETRICS & GYNECOLOGY

## 2023-01-03 PROCEDURE — 3075F SYST BP GE 130 - 139MM HG: CPT | Mod: CPTII,,, | Performed by: OBSTETRICS & GYNECOLOGY

## 2023-01-03 PROCEDURE — 80053 COMPREHEN METABOLIC PANEL: CPT | Performed by: INTERNAL MEDICINE

## 2023-01-03 PROCEDURE — 86803 HEPATITIS C AB TEST: CPT | Performed by: INTERNAL MEDICINE

## 2023-01-03 PROCEDURE — 88175 CYTOPATH C/V AUTO FLUID REDO: CPT | Performed by: OBSTETRICS & GYNECOLOGY

## 2023-01-03 PROCEDURE — 99204 PR OFFICE/OUTPT VISIT, NEW, LEVL IV, 45-59 MIN: ICD-10-PCS | Mod: S$PBB,TH,, | Performed by: OBSTETRICS & GYNECOLOGY

## 2023-01-03 PROCEDURE — 3079F DIAST BP 80-89 MM HG: CPT | Mod: CPTII,,, | Performed by: OBSTETRICS & GYNECOLOGY

## 2023-01-03 PROCEDURE — 86900 BLOOD TYPING SEROLOGIC ABO: CPT | Performed by: OBSTETRICS & GYNECOLOGY

## 2023-01-03 PROCEDURE — 3075F PR MOST RECENT SYSTOLIC BLOOD PRESS GE 130-139MM HG: ICD-10-PCS | Mod: CPTII,,, | Performed by: OBSTETRICS & GYNECOLOGY

## 2023-01-03 PROCEDURE — 99213 OFFICE O/P EST LOW 20 MIN: CPT | Mod: PBBFAC,TH | Performed by: OBSTETRICS & GYNECOLOGY

## 2023-01-03 PROCEDURE — 87536 HIV-1 QUANT&REVRSE TRNSCRPJ: CPT | Performed by: INTERNAL MEDICINE

## 2023-01-03 PROCEDURE — 87086 URINE CULTURE/COLONY COUNT: CPT | Performed by: OBSTETRICS & GYNECOLOGY

## 2023-01-03 PROCEDURE — 86480 TB TEST CELL IMMUN MEASURE: CPT | Performed by: INTERNAL MEDICINE

## 2023-01-03 RX ORDER — ONDANSETRON 4 MG/1
4 TABLET, FILM COATED ORAL DAILY PRN
Qty: 30 TABLET | Refills: 1 | Status: ON HOLD | OUTPATIENT
Start: 2023-01-03 | End: 2023-08-02 | Stop reason: SDUPTHER

## 2023-01-03 RX ORDER — LACTOBACILLUS COMBINATION NO.9 4B CELL
CAPSULE ORAL
COMMUNITY

## 2023-01-03 NOTE — PROGRESS NOTES
Subjective:   Patient ID: Nidhi Willis is a 22 y.o. y.o. female.     Chief Complaint: Missed Menses       History of Present Illness:    Nidhi presents today complaining of amenorrhea. Patient's last menstrual period was 2022.. Prior menstrual cycles have been regular. She reports breast tenderness, nausea, and positive home pregnancy test. UPT is positive.       Past Medical History:   Diagnosis Date    Herpes simplex virus (HSV) infection     HIV infection     HIV positive     Hx of tonsillectomy     Seizures      Past Surgical History:   Procedure Laterality Date     SECTION  2017    TONSILLECTOMY      tubes in ears       Social History     Socioeconomic History    Marital status: Single   Tobacco Use    Smoking status: Never    Smokeless tobacco: Never   Substance and Sexual Activity    Alcohol use: Not Currently     Comment: oss    Drug use: Not Currently     Types: Marijuana     Comment: last use 2016    Sexual activity: Yes     Partners: Male     Birth control/protection: None     Comment: single   Social History Narrative    Lives with mother      Family History   Problem Relation Age of Onset    Hypertension Mother     No Known Problems Father     No Known Problems Sister     No Known Problems Brother     No Known Problems Maternal Aunt     No Known Problems Maternal Uncle     No Known Problems Paternal Aunt     No Known Problems Paternal Uncle     No Known Problems Maternal Grandmother     No Known Problems Maternal Grandfather     No Known Problems Paternal Grandmother     No Known Problems Paternal Grandfather     ADD / ADHD Neg Hx     Alcohol abuse Neg Hx     Allergies Neg Hx     Asthma Neg Hx     Autism spectrum disorder Neg Hx     Behavior problems Neg Hx     Birth defects Neg Hx     Cancer Neg Hx     Chromosomal disorder Neg Hx     Cleft lip Neg Hx     Congenital heart disease Neg Hx     Depression Neg Hx     Diabetes Neg Hx     Early death Neg Hx     Eczema Neg  Hx     Hearing loss Neg Hx     Heart disease Neg Hx     Hyperlipidemia Neg Hx     Kidney disease Neg Hx     Learning disabilities Neg Hx     Mental illness Neg Hx     Migraines Neg Hx     Neurodegenerative disease Neg Hx     Obesity Neg Hx     Seizures Neg Hx     SIDS Neg Hx     Thyroid disease Neg Hx     Other Neg Hx     Breast cancer Neg Hx     Ovarian cancer Neg Hx      OB History    Para Term  AB Living   2 1 1 0 0 0   SAB IAB Ectopic Multiple Live Births   0 0 0 0        # Outcome Date GA Lbr Steve/2nd Weight Sex Delivery Anes PTL Lv   2 Current            1 Term 17 37w1d  2.91 kg (6 lb 6.7 oz) M CS-LTranv Spinal           ROS:   Review of Systems   Constitutional:  Negative for chills, diaphoresis, fatigue, fever and unexpected weight change.   HENT:  Negative for congestion, hearing loss, rhinorrhea and sore throat.    Eyes:  Negative for pain, discharge and visual disturbance.   Respiratory:  Negative for apnea, cough, shortness of breath and wheezing.    Cardiovascular:  Negative for chest pain, palpitations and leg swelling.   Gastrointestinal:  Positive for nausea. Negative for abdominal pain, constipation, diarrhea and vomiting.   Endocrine: Negative for cold intolerance and heat intolerance.   Genitourinary:  Negative for difficulty urinating, dyspareunia, dysuria, flank pain, frequency, genital sores, hematuria, menstrual problem, pelvic pain, vaginal bleeding, vaginal discharge and vaginal pain.   Musculoskeletal:  Negative for arthralgias, back pain and joint swelling.   Skin:  Negative for rash.   Neurological:  Negative for dizziness, weakness, light-headedness, numbness and headaches.   Psychiatric/Behavioral:  Negative for agitation and confusion. The patient is not nervous/anxious.          Objective:   Vital Signs:  Vitals:    23 1410   BP: 130/82   Pulse: 88   Resp: 16       Physical Exam:  General:  alert, cooperative, no distress   Head: Normocephalic, atraumatic    Neck: Supple, Normal ROM   Respiratory: Normal effort   Neuro/Psych: Alert and oriented, appropriate mood and affect   Abdomen:  soft, non-tender; bowel sounds normal   Pelvis: External genitalia: normal general appearance  Urinary system: urethral meatus normal, bladder nontender  Vaginal: normal mucosa without prolapse or lesions  Cervix: normal appearance  Uterus: normal size, shape, position  Adnexa: normal size, nontender bilaterally   Ext: No edema       Assessment:      1. Pregnancy examination or test, positive result    2. HIV affecting pregnancy, antepartum    3. Screening for cervical cancer    4. Nausea and vomiting in pregnancy          Plan:        Pregnancy examination or test, positive result  -     CULTURE, URINE  -     C. trachomatis/N. gonorrhoeae by AMP DNA [facility]; Cervix  -     Liquid-Based Pap Smear, Screening  -     Type & Screen - Ob Profile; Future; Expected date: 01/03/2023  -     Hepatitis B Surface Antigen; Future; Expected date: 01/03/2023  -     Rubella Antibody, IgG; Future; Expected date: 01/03/2023  -     Cancel: RPR; Future; Expected date: 01/03/2023  -      OB/GYN Procedure ([facility]) - Extended List; Future; Expected date: 01/17/2023  -     Cancel: Hepatitis C Antibody; Future; Expected date: 01/03/2023    HIV affecting pregnancy, antepartum  -     Liquid-Based Pap Smear, Screening  -     Hepatitis B Surface Antigen; Future; Expected date: 01/03/2023  -     Cancel: RPR; Future; Expected date: 01/03/2023  -      OB/GYN Procedure ([facility]) - Extended List; Future; Expected date: 01/17/2023  -     Cancel: Hepatitis C Antibody; Future; Expected date: 01/03/2023    Screening for cervical cancer  -     Liquid-Based Pap Smear, Screening    Nausea and vomiting in pregnancy  -     ondansetron (ZOFRAN) 4 MG tablet; Take 1 tablet (4 mg total) by mouth daily as needed for Nausea.  Dispense: 30 tablet; Refill: 1        1. Pap today  2. Prenatal labs ordered and GC/CT performed.  3.  Schedule Ultrasound  4. Follow up in 4 weeks after ultrasound.    5. Pt with ID follow up for HIV scheduled.    Patient was counseled today on proper weight gain based on the [place] [employer]'s recommendations based on her pre-pregnancy weight. Discussed foods to avoid in pregnancy (i.e. sushi, fish that are high in mercury, deli meat, and unpasteurized cheeses). Discussed prenatal vitamin options (i.e. stool softener, DHA). She was also counseled on safe, healthy behavior as well as medications safe in pregnancy.

## 2023-01-04 LAB
CD3+CD4+ CELLS # BLD: 732 CELLS/UL (ref 300–1400)
CD3+CD4+ CELLS NFR BLD: 31.1 % (ref 28–57)
HBV SURFACE AG SERPL QL IA: NORMAL
HCV AB SERPL QL IA: NORMAL
HIV1 RNA # SERPL NAA+PROBE: 158 COPIES/ML
HIV1 RNA SERPL NAA+PROBE-LOG#: 2.2 LOG COPIES/ML
HIV1 RNA SERPL QL NAA+PROBE: DETECTED
RPR SER QL: NORMAL
RUBV IGG SER-ACNC: 25 IU/ML
RUBV IGG SER-IMP: REACTIVE

## 2023-01-05 LAB
BACTERIA UR CULT: NORMAL
BACTERIA UR CULT: NORMAL
C TRACH DNA SPEC QL NAA+PROBE: NOT DETECTED
GAMMA INTERFERON BACKGROUND BLD IA-ACNC: 0.04 IU/ML
M TB IFN-G CD4+ BCKGRND COR BLD-ACNC: 0 IU/ML
MITOGEN IGNF BCKGRD COR BLD-ACNC: 9.96 IU/ML
N GONORRHOEA DNA SPEC QL NAA+PROBE: NOT DETECTED
TB GOLD PLUS: NEGATIVE
TB2 - NIL: 0.01 IU/ML

## 2023-01-09 LAB
FINAL PATHOLOGIC DIAGNOSIS: NORMAL
Lab: NORMAL

## 2023-01-13 LAB — HIV GENTYP ISLT: DETECTED

## 2023-01-17 ENCOUNTER — PROCEDURE VISIT (OUTPATIENT)
Dept: OBSTETRICS AND GYNECOLOGY | Facility: CLINIC | Age: 23
End: 2023-01-17
Payer: MEDICAID

## 2023-01-17 DIAGNOSIS — Z32.01 PREGNANCY EXAMINATION OR TEST, POSITIVE RESULT: ICD-10-CM

## 2023-01-17 PROCEDURE — 76801 US OB/GYN EXTENDED PROCEDURE (VIEWPOINT): ICD-10-PCS | Mod: 26,S$PBB,, | Performed by: OBSTETRICS & GYNECOLOGY

## 2023-01-17 PROCEDURE — 76801 OB US < 14 WKS SINGLE FETUS: CPT | Mod: PBBFAC | Performed by: OBSTETRICS & GYNECOLOGY

## 2023-02-01 ENCOUNTER — INITIAL PRENATAL (OUTPATIENT)
Dept: OBSTETRICS AND GYNECOLOGY | Facility: CLINIC | Age: 23
End: 2023-02-01
Payer: MEDICAID

## 2023-02-01 ENCOUNTER — LAB VISIT (OUTPATIENT)
Dept: LAB | Facility: HOSPITAL | Age: 23
End: 2023-02-01
Attending: OBSTETRICS & GYNECOLOGY
Payer: MEDICAID

## 2023-02-01 VITALS
SYSTOLIC BLOOD PRESSURE: 118 MMHG | BODY MASS INDEX: 24.96 KG/M2 | HEART RATE: 88 BPM | DIASTOLIC BLOOD PRESSURE: 74 MMHG | WEIGHT: 150 LBS

## 2023-02-01 DIAGNOSIS — O98.712 HIV DISEASE AFFECTING PREGNANCY IN SECOND TRIMESTER: ICD-10-CM

## 2023-02-01 DIAGNOSIS — Z3A.12 12 WEEKS GESTATION OF PREGNANCY: ICD-10-CM

## 2023-02-01 DIAGNOSIS — O09.892 SUPERVISION OF OTHER HIGH RISK PREGNANCIES, SECOND TRIMESTER: ICD-10-CM

## 2023-02-01 DIAGNOSIS — O09.892 SUPERVISION OF OTHER HIGH RISK PREGNANCIES, SECOND TRIMESTER: Primary | ICD-10-CM

## 2023-02-01 PROCEDURE — 99213 PR OFFICE/OUTPT VISIT, EST, LEVL III, 20-29 MIN: ICD-10-PCS | Mod: TH,S$PBB,, | Performed by: OBSTETRICS & GYNECOLOGY

## 2023-02-01 PROCEDURE — 36415 COLL VENOUS BLD VENIPUNCTURE: CPT | Performed by: OBSTETRICS & GYNECOLOGY

## 2023-02-01 PROCEDURE — 99212 OFFICE O/P EST SF 10 MIN: CPT | Mod: PBBFAC,TH | Performed by: OBSTETRICS & GYNECOLOGY

## 2023-02-01 PROCEDURE — 99999 PR PBB SHADOW E&M-EST. PATIENT-LVL II: CPT | Mod: PBBFAC,,, | Performed by: OBSTETRICS & GYNECOLOGY

## 2023-02-01 PROCEDURE — 99999 PR PBB SHADOW E&M-EST. PATIENT-LVL II: ICD-10-PCS | Mod: PBBFAC,,, | Performed by: OBSTETRICS & GYNECOLOGY

## 2023-02-01 PROCEDURE — 99213 OFFICE O/P EST LOW 20 MIN: CPT | Mod: TH,S$PBB,, | Performed by: OBSTETRICS & GYNECOLOGY

## 2023-02-01 NOTE — PROGRESS NOTES
Patient with no obstetrical complaints. Reviewed prenatal labs with patient. Reviewed dating criteria. Discussed proper nutrition and weight gain in pregnancy. No vaginal bleeding or cramping noted. SAB precautions discussed.   ID (MD Blake) appointment note reviewed.  Pt on therapy.   Genetic screening discussed. Pt with abnormal Quad last pregnancy and would prefer to have [id number] testing.  Patient to RTC in 4 weeks.     Vitals signs, FHTs, urine dip, and PE findings documented, reviewed and available in OB flow chart.       I spent a total of 20 minutes on the day of the visit.This includes face to face time and non-face to face time preparing to see the patient (eg, review of tests), Obtaining and/or reviewing separately obtained history, Documenting clinical information in the electronic or other health record, Independently interpreting resultsand communicating results to the patient/family/caregiver, or Care coordination.     Initial ob packet supplied to patient. Contents supplied and discussed include medications safe in pregnancy, pregnancy A to Z, class schedule, pregnancy checklist, car seat safety, and handout on skin to skin and breastfeeding. Coeffective king card supplied and discussed.

## 2023-02-04 ENCOUNTER — PATIENT MESSAGE (OUTPATIENT)
Dept: ADMINISTRATIVE | Facility: OTHER | Age: 23
End: 2023-02-04
Payer: MEDICAID

## 2023-02-10 ENCOUNTER — TELEPHONE (OUTPATIENT)
Dept: OBSTETRICS AND GYNECOLOGY | Facility: CLINIC | Age: 23
End: 2023-02-10
Payer: MEDICAID

## 2023-02-10 NOTE — TELEPHONE ENCOUNTER
Pt was called and informed of negative MaterniT 21 results. Pt requested to know fetal gender over the phone and pt informed. Pt voiced understanding.

## 2023-02-15 ENCOUNTER — TELEPHONE (OUTPATIENT)
Dept: OBSTETRICS AND GYNECOLOGY | Facility: CLINIC | Age: 23
End: 2023-02-15
Payer: MEDICAID

## 2023-02-15 NOTE — TELEPHONE ENCOUNTER
----- Message from Christiana Shaver MA sent at 2/15/2023  3:11 PM CST -----  Contact: SELF  Nidhi Willis  MRN: [id number]  Home Phone      [phone]  Work Phone      Not on file.  Mobile          [phone]    Patient Care Team:  Primary Doctor No as PCP - General  Fanny Lozano MD as PCP - Infectious Disease (Infectious Diseases)  Anna Domingo MD as Consulting Physician (Obstetrics and Gynecology)  OB? Yes, 14w2d  What phone number can you be reached at? [phone]  Message: Would like materni 21 test results.

## 2023-02-15 NOTE — TELEPHONE ENCOUNTER
Pt was called and desires copy of [id number] results. Results placed in envelope for pt to  at . Pt voiced understanding.

## 2023-02-27 ENCOUNTER — PATIENT MESSAGE (OUTPATIENT)
Dept: OTHER | Facility: OTHER | Age: 23
End: 2023-02-27
Payer: MEDICAID

## 2023-03-06 ENCOUNTER — PATIENT MESSAGE (OUTPATIENT)
Dept: OTHER | Facility: OTHER | Age: 23
End: 2023-03-06
Payer: MEDICAID

## 2023-03-06 ENCOUNTER — TELEPHONE (OUTPATIENT)
Dept: OBSTETRICS AND GYNECOLOGY | Facility: CLINIC | Age: 23
End: 2023-03-06
Payer: MEDICAID

## 2023-03-06 NOTE — TELEPHONE ENCOUNTER
----- Message from Sylwia Faust sent at 3/6/2023  1:27 PM CST -----  Contact: self  Nidhi Willis  MRN: [id number]  Home Phone      [phone]  Work Phone      Not on file.  Mobile          [phone]    Patient Care Team:  Primary Doctor No as PCP - General  Fanny Lozano MD as PCP - Infectious Disease (Infectious Diseases)  Anna Domingo MD as Consulting Physician (Obstetrics and Gynecology)  OB? Yes, 17w0d  What phone number can you be reached at? [phone]  Message: patient is needing to reschedule missed OB appointment

## 2023-03-06 NOTE — TELEPHONE ENCOUNTER
Attempted to contact pt at number provided x 2. Message stated she is unable to receive calls at this time then hangs up.

## 2023-03-14 ENCOUNTER — ROUTINE PRENATAL (OUTPATIENT)
Dept: OBSTETRICS AND GYNECOLOGY | Facility: CLINIC | Age: 23
End: 2023-03-14
Payer: MEDICAID

## 2023-03-14 VITALS
BODY MASS INDEX: 27.04 KG/M2 | SYSTOLIC BLOOD PRESSURE: 112 MMHG | HEART RATE: 92 BPM | DIASTOLIC BLOOD PRESSURE: 66 MMHG | WEIGHT: 162.5 LBS

## 2023-03-14 DIAGNOSIS — O09.892 SUPERVISION OF OTHER HIGH RISK PREGNANCIES, SECOND TRIMESTER: Primary | ICD-10-CM

## 2023-03-14 DIAGNOSIS — Z3A.18 18 WEEKS GESTATION OF PREGNANCY: ICD-10-CM

## 2023-03-14 PROCEDURE — 99213 OFFICE O/P EST LOW 20 MIN: CPT | Mod: TH,S$PBB,, | Performed by: OBSTETRICS & GYNECOLOGY

## 2023-03-14 PROCEDURE — 99213 PR OFFICE/OUTPT VISIT, EST, LEVL III, 20-29 MIN: ICD-10-PCS | Mod: TH,S$PBB,, | Performed by: OBSTETRICS & GYNECOLOGY

## 2023-03-14 PROCEDURE — 99213 OFFICE O/P EST LOW 20 MIN: CPT | Mod: PBBFAC,TH | Performed by: OBSTETRICS & GYNECOLOGY

## 2023-03-14 PROCEDURE — 99999 PR PBB SHADOW E&M-EST. PATIENT-LVL III: CPT | Mod: PBBFAC,,, | Performed by: OBSTETRICS & GYNECOLOGY

## 2023-03-14 PROCEDURE — 99999 PR PBB SHADOW E&M-EST. PATIENT-LVL III: ICD-10-PCS | Mod: PBBFAC,,, | Performed by: OBSTETRICS & GYNECOLOGY

## 2023-03-14 RX ORDER — PNV NO.52/IRON/FA/OMEGA-3/DHA 29-1-200MG
1 COMBINATION PACKAGE (EA) ORAL DAILY
Qty: 30 EACH | Refills: 11 | Status: SHIPPED | OUTPATIENT
Start: 2023-03-14 | End: 2023-05-08

## 2023-03-14 NOTE — PROGRESS NOTES
Patient with no complaints. Denies vaginal bleeding or cramping.  . Anatomy scan ordered. RTC in 4 weeks    Vitals signs, FHTs, urine dip, and PE findings documented, reviewed and available in OB flow chart.       I spent a total of 20 minutes on the day of the visit.This includes face to face time and non-face to face time preparing to see the patient (eg, review of tests), Obtaining and/or reviewing separately obtained history, Documenting clinical information in the electronic or other health record, Independently interpreting resultsand communicating results to the patient/family/caregiver, or Care coordination.     Coffective counseling sheet Fall In Love discussed with mother. Reinforced immediate skin to skin, the magic first hour, importance of the first feeding and delaying routine procedures. Encouraged mother to download Coffective mobile king if she has not already done so. Mother verbalizes understanding.

## 2023-03-15 ENCOUNTER — LAB VISIT (OUTPATIENT)
Dept: LAB | Facility: HOSPITAL | Age: 23
End: 2023-03-15
Attending: INTERNAL MEDICINE
Payer: MEDICAID

## 2023-03-15 DIAGNOSIS — B20 HIV, SYMPTOMATIC: ICD-10-CM

## 2023-03-15 LAB
ALBUMIN SERPL BCP-MCNC: 2.9 G/DL (ref 3.5–5.2)
ALP SERPL-CCNC: 42 U/L (ref 55–135)
ALT SERPL W/O P-5'-P-CCNC: 60 U/L (ref 10–44)
ANION GAP SERPL CALC-SCNC: 7 MMOL/L (ref 8–16)
AST SERPL-CCNC: 52 U/L (ref 10–40)
BASOPHILS # BLD AUTO: 0.02 K/UL (ref 0–0.2)
BASOPHILS NFR BLD: 0.2 % (ref 0–1.9)
BILIRUB SERPL-MCNC: 0.2 MG/DL (ref 0.1–1)
BUN SERPL-MCNC: 8 MG/DL (ref 6–20)
CALCIUM SERPL-MCNC: 8.5 MG/DL (ref 8.7–10.5)
CHLORIDE SERPL-SCNC: 104 MMOL/L (ref 95–110)
CO2 SERPL-SCNC: 23 MMOL/L (ref 23–29)
CREAT SERPL-MCNC: 0.6 MG/DL (ref 0.5–1.4)
DIFFERENTIAL METHOD: ABNORMAL
EOSINOPHIL # BLD AUTO: 0.1 K/UL (ref 0–0.5)
EOSINOPHIL NFR BLD: 1.3 % (ref 0–8)
ERYTHROCYTE [DISTWIDTH] IN BLOOD BY AUTOMATED COUNT: 13.6 % (ref 11.5–14.5)
EST. GFR  (NO RACE VARIABLE): >60 ML/MIN/1.73 M^2
GLUCOSE SERPL-MCNC: 82 MG/DL (ref 70–110)
HCT VFR BLD AUTO: 32.2 % (ref 37–48.5)
HGB BLD-MCNC: 10.3 G/DL (ref 12–16)
IMM GRANULOCYTES # BLD AUTO: 0.06 K/UL (ref 0–0.04)
IMM GRANULOCYTES NFR BLD AUTO: 0.7 % (ref 0–0.5)
LYMPHOCYTES # BLD AUTO: 2.1 K/UL (ref 1–4.8)
LYMPHOCYTES NFR BLD: 23.3 % (ref 18–48)
MCH RBC QN AUTO: 25.9 PG (ref 27–31)
MCHC RBC AUTO-ENTMCNC: 32 G/DL (ref 32–36)
MCV RBC AUTO: 81 FL (ref 82–98)
MONOCYTES # BLD AUTO: 0.9 K/UL (ref 0.3–1)
MONOCYTES NFR BLD: 9.7 % (ref 4–15)
NEUTROPHILS # BLD AUTO: 5.9 K/UL (ref 1.8–7.7)
NEUTROPHILS NFR BLD: 64.8 % (ref 38–73)
NRBC BLD-RTO: 0 /100 WBC
PLATELET # BLD AUTO: 176 K/UL (ref 150–450)
PMV BLD AUTO: 10.7 FL (ref 9.2–12.9)
POTASSIUM SERPL-SCNC: 3.8 MMOL/L (ref 3.5–5.1)
PROT SERPL-MCNC: 6.3 G/DL (ref 6–8.4)
RBC # BLD AUTO: 3.98 M/UL (ref 4–5.4)
SODIUM SERPL-SCNC: 134 MMOL/L (ref 136–145)
WBC # BLD AUTO: 9.03 K/UL (ref 3.9–12.7)

## 2023-03-15 PROCEDURE — 85025 COMPLETE CBC W/AUTO DIFF WBC: CPT | Performed by: INTERNAL MEDICINE

## 2023-03-15 PROCEDURE — 86361 T CELL ABSOLUTE COUNT: CPT | Performed by: INTERNAL MEDICINE

## 2023-03-15 PROCEDURE — 80053 COMPREHEN METABOLIC PANEL: CPT | Performed by: INTERNAL MEDICINE

## 2023-03-15 PROCEDURE — 86706 HEP B SURFACE ANTIBODY: CPT | Mod: 91 | Performed by: INTERNAL MEDICINE

## 2023-03-15 PROCEDURE — 36415 COLL VENOUS BLD VENIPUNCTURE: CPT | Performed by: INTERNAL MEDICINE

## 2023-03-15 PROCEDURE — 86592 SYPHILIS TEST NON-TREP QUAL: CPT | Performed by: INTERNAL MEDICINE

## 2023-03-15 PROCEDURE — 87536 HIV-1 QUANT&REVRSE TRNSCRPJ: CPT | Performed by: INTERNAL MEDICINE

## 2023-03-16 LAB
CD3+CD4+ CELLS # BLD: 759 CELLS/UL (ref 300–1400)
CD3+CD4+ CELLS NFR BLD: 33.7 % (ref 28–57)
HBV SURFACE AB SER-ACNC: >1000 MIU/ML
HBV SURFACE AB SER-ACNC: REACTIVE M[IU]/ML
HIV1 RNA # SERPL NAA+PROBE: NOT DETECTED COPIES/ML
HIV1 RNA SERPL QL NAA+PROBE: NOT DETECTED

## 2023-03-17 LAB — RPR SER QL: NORMAL

## 2023-03-27 ENCOUNTER — PATIENT MESSAGE (OUTPATIENT)
Dept: OTHER | Facility: OTHER | Age: 23
End: 2023-03-27
Payer: MEDICAID

## 2023-03-28 ENCOUNTER — PROCEDURE VISIT (OUTPATIENT)
Dept: OBSTETRICS AND GYNECOLOGY | Facility: CLINIC | Age: 23
End: 2023-03-28
Payer: MEDICAID

## 2023-03-28 DIAGNOSIS — Z3A.18 18 WEEKS GESTATION OF PREGNANCY: ICD-10-CM

## 2023-03-28 DIAGNOSIS — O09.892 SUPERVISION OF OTHER HIGH RISK PREGNANCIES, SECOND TRIMESTER: ICD-10-CM

## 2023-03-28 DIAGNOSIS — Z36.2 ENCOUNTER FOR FOLLOW-UP ULTRASOUND OF FETAL ANATOMY: Primary | ICD-10-CM

## 2023-03-28 PROCEDURE — 76811 US OB/GYN EXTENDED PROCEDURE (VIEWPOINT): ICD-10-PCS | Mod: 26,S$PBB,, | Performed by: OBSTETRICS & GYNECOLOGY

## 2023-03-28 PROCEDURE — 76811 OB US DETAILED SNGL FETUS: CPT | Mod: PBBFAC | Performed by: OBSTETRICS & GYNECOLOGY

## 2023-04-10 ENCOUNTER — ROUTINE PRENATAL (OUTPATIENT)
Dept: OBSTETRICS AND GYNECOLOGY | Facility: CLINIC | Age: 23
End: 2023-04-10
Payer: MEDICAID

## 2023-04-10 VITALS
BODY MASS INDEX: 27.31 KG/M2 | SYSTOLIC BLOOD PRESSURE: 108 MMHG | HEART RATE: 87 BPM | WEIGHT: 164.13 LBS | DIASTOLIC BLOOD PRESSURE: 68 MMHG

## 2023-04-10 DIAGNOSIS — Z34.92 PRENATAL CARE IN SECOND TRIMESTER: Primary | ICD-10-CM

## 2023-04-10 DIAGNOSIS — O09.892 SUPERVISION OF OTHER HIGH RISK PREGNANCIES, SECOND TRIMESTER: Primary | ICD-10-CM

## 2023-04-10 DIAGNOSIS — O98.712 HIV DISEASE AFFECTING PREGNANCY IN SECOND TRIMESTER: ICD-10-CM

## 2023-04-10 DIAGNOSIS — Z3A.22 22 WEEKS GESTATION OF PREGNANCY: ICD-10-CM

## 2023-04-10 DIAGNOSIS — Z34.92 ENCOUNTER FOR SUPERVISION OF NORMAL PREGNANCY IN SECOND TRIMESTER, UNSPECIFIED GRAVIDITY: ICD-10-CM

## 2023-04-10 PROCEDURE — 99212 OFFICE O/P EST SF 10 MIN: CPT | Mod: PBBFAC,TH | Performed by: OBSTETRICS & GYNECOLOGY

## 2023-04-10 PROCEDURE — 99999 PR PBB SHADOW E&M-EST. PATIENT-LVL II: CPT | Mod: PBBFAC,,, | Performed by: OBSTETRICS & GYNECOLOGY

## 2023-04-10 PROCEDURE — 99213 PR OFFICE/OUTPT VISIT, EST, LEVL III, 20-29 MIN: ICD-10-PCS | Mod: TH,S$PBB,, | Performed by: OBSTETRICS & GYNECOLOGY

## 2023-04-10 PROCEDURE — 99213 OFFICE O/P EST LOW 20 MIN: CPT | Mod: TH,S$PBB,, | Performed by: OBSTETRICS & GYNECOLOGY

## 2023-04-10 PROCEDURE — 99999 PR PBB SHADOW E&M-EST. PATIENT-LVL II: ICD-10-PCS | Mod: PBBFAC,,, | Performed by: OBSTETRICS & GYNECOLOGY

## 2023-04-10 NOTE — PROGRESS NOTES
Patient with no complaints. Denies vaginal bleeding or cramping.  Good FM. Discussed with patient having glucose testing for gestational diabetes preformed between 24-28 weeks. RTC in 4 weeks.     Vitals signs, FHTs, urine dip, and PE findings documented, reviewed and available in OB flow chart.       I spent a total of 20 minutes on the day of the visit.This includes face to face time and non-face to face time preparing to see the patient (eg, review of tests), Obtaining and/or reviewing separately obtained history, Documenting clinical information in the electronic or other health record, Independently interpreting resultsand communicating results to the patient/family/caregiver, or Care coordination.     Coffective counseling sheet Learn Your Baby and Protect Breastfeeding discussed with mother. Instructed regarding feeding cues and methods to calm baby. Encouraged mother to download Coffective mobile king if she has not already done so.  Mother verbalized understanding.

## 2023-04-24 ENCOUNTER — PATIENT MESSAGE (OUTPATIENT)
Dept: OTHER | Facility: OTHER | Age: 23
End: 2023-04-24
Payer: MEDICAID

## 2023-05-08 ENCOUNTER — PATIENT MESSAGE (OUTPATIENT)
Dept: OTHER | Facility: OTHER | Age: 23
End: 2023-05-08
Payer: MEDICAID

## 2023-05-08 ENCOUNTER — ROUTINE PRENATAL (OUTPATIENT)
Dept: OBSTETRICS AND GYNECOLOGY | Facility: CLINIC | Age: 23
End: 2023-05-08
Payer: MEDICAID

## 2023-05-08 VITALS
DIASTOLIC BLOOD PRESSURE: 84 MMHG | SYSTOLIC BLOOD PRESSURE: 132 MMHG | HEART RATE: 106 BPM | BODY MASS INDEX: 28.86 KG/M2 | WEIGHT: 173.38 LBS

## 2023-05-08 DIAGNOSIS — O09.892 SUPERVISION OF OTHER HIGH RISK PREGNANCIES, SECOND TRIMESTER: Primary | ICD-10-CM

## 2023-05-08 DIAGNOSIS — Z3A.26 26 WEEKS GESTATION OF PREGNANCY: ICD-10-CM

## 2023-05-08 DIAGNOSIS — O98.712: ICD-10-CM

## 2023-05-08 PROCEDURE — 99213 PR OFFICE/OUTPT VISIT, EST, LEVL III, 20-29 MIN: ICD-10-PCS | Mod: TH,S$PBB,, | Performed by: OBSTETRICS & GYNECOLOGY

## 2023-05-08 PROCEDURE — 99212 OFFICE O/P EST SF 10 MIN: CPT | Mod: PBBFAC,TH | Performed by: OBSTETRICS & GYNECOLOGY

## 2023-05-08 PROCEDURE — 99999 PR PBB SHADOW E&M-EST. PATIENT-LVL II: ICD-10-PCS | Mod: PBBFAC,,, | Performed by: OBSTETRICS & GYNECOLOGY

## 2023-05-08 PROCEDURE — 99999 PR PBB SHADOW E&M-EST. PATIENT-LVL II: CPT | Mod: PBBFAC,,, | Performed by: OBSTETRICS & GYNECOLOGY

## 2023-05-08 PROCEDURE — 99213 OFFICE O/P EST LOW 20 MIN: CPT | Mod: TH,S$PBB,, | Performed by: OBSTETRICS & GYNECOLOGY

## 2023-05-08 NOTE — LETTER
May 8, 2023    Nidhi Willis  [address] [facility] [address]              [place] [facility]  [address] [facility] [address]  Phone: [phone]    To Whom It May Concern:    Ms. Nidhi Willis is currently under our care for a high risk pregnancy. For this reason she is unable to participate in jury duty at this time.    Sincerely,          Anna Domingo MD

## 2023-05-08 NOTE — PROGRESS NOTES
Patient with no c/o nausea. Denies vaginal bleeding or contractions.Occ round ligament pain.  Good FM. GTT/CBC ordered. Pt with nausea and needs to reschedule to next appointment.    labor precautions discussed with patient.   Reviewed last labs with ID. VL - not detectable; CD4 759  Desires repeat . Will schedule at next appointment.   RTC in 2 weeks.     Vitals signs, FHTs, urine dip, and PE findings documented, reviewed and available in OB flow chart.       I spent a total of 20 minutes on the day of the visit.This includes face to face time and non-face to face time preparing to see the patient (eg, review of tests), Obtaining and/or reviewing separately obtained history, Documenting clinical information in the electronic or other health record, Independently interpreting resultsand communicating results to the patient/family/caregiver, or Care coordination.     Coffective counseling sheet Nourish discussed with mother. Reinforced basic breastfeeding position and latch as well as proper hand expression technique and avoidance of artificial nipples and formula unless medically indicated. Encouraged mother to download Coffective mobile king if she has not already done so.  Mother verbalizes understanding.

## 2023-05-09 ENCOUNTER — PROCEDURE VISIT (OUTPATIENT)
Dept: OBSTETRICS AND GYNECOLOGY | Facility: CLINIC | Age: 23
End: 2023-05-09
Payer: MEDICAID

## 2023-05-09 DIAGNOSIS — Z36.2 ENCOUNTER FOR FOLLOW-UP ULTRASOUND OF FETAL ANATOMY: ICD-10-CM

## 2023-05-09 PROCEDURE — 76816 US OB/GYN EXTENDED PROCEDURE (VIEWPOINT): ICD-10-PCS | Mod: 26,S$PBB,, | Performed by: OBSTETRICS & GYNECOLOGY

## 2023-05-09 PROCEDURE — 76816 OB US FOLLOW-UP PER FETUS: CPT | Mod: PBBFAC | Performed by: OBSTETRICS & GYNECOLOGY

## 2023-05-22 ENCOUNTER — PATIENT MESSAGE (OUTPATIENT)
Dept: OTHER | Facility: OTHER | Age: 23
End: 2023-05-22
Payer: MEDICAID

## 2023-05-22 ENCOUNTER — LAB VISIT (OUTPATIENT)
Dept: LAB | Facility: HOSPITAL | Age: 23
End: 2023-05-22
Attending: OBSTETRICS & GYNECOLOGY
Payer: MEDICAID

## 2023-05-22 ENCOUNTER — ROUTINE PRENATAL (OUTPATIENT)
Dept: OBSTETRICS AND GYNECOLOGY | Facility: CLINIC | Age: 23
End: 2023-05-22
Payer: MEDICAID

## 2023-05-22 VITALS
BODY MASS INDEX: 28.79 KG/M2 | HEART RATE: 108 BPM | DIASTOLIC BLOOD PRESSURE: 70 MMHG | WEIGHT: 173 LBS | SYSTOLIC BLOOD PRESSURE: 128 MMHG

## 2023-05-22 DIAGNOSIS — O09.893 HIV (HUMAN IMMUNODEFICIENCY VIRUS) RISK FACTORS COMPLICATING PREGNANCY, THIRD TRIMESTER: ICD-10-CM

## 2023-05-22 DIAGNOSIS — Z34.92 ENCOUNTER FOR SUPERVISION OF NORMAL PREGNANCY IN SECOND TRIMESTER, UNSPECIFIED GRAVIDITY: ICD-10-CM

## 2023-05-22 DIAGNOSIS — Z23 NEED FOR DIPHTHERIA-TETANUS-PERTUSSIS (TDAP) VACCINE: ICD-10-CM

## 2023-05-22 DIAGNOSIS — O09.93 SUPERVISION OF HIGH-RISK PREGNANCY, THIRD TRIMESTER: Primary | ICD-10-CM

## 2023-05-22 DIAGNOSIS — O34.219 HISTORY OF CESAREAN DELIVERY AFFECTING PREGNANCY: ICD-10-CM

## 2023-05-22 DIAGNOSIS — Z3A.28 28 WEEKS GESTATION OF PREGNANCY: ICD-10-CM

## 2023-05-22 DIAGNOSIS — Z34.92 PRENATAL CARE IN SECOND TRIMESTER: ICD-10-CM

## 2023-05-22 LAB
BASOPHILS # BLD AUTO: 0.02 K/UL (ref 0–0.2)
BASOPHILS NFR BLD: 0.2 % (ref 0–1.9)
DIFFERENTIAL METHOD: ABNORMAL
EOSINOPHIL # BLD AUTO: 0.1 K/UL (ref 0–0.5)
EOSINOPHIL NFR BLD: 0.9 % (ref 0–8)
ERYTHROCYTE [DISTWIDTH] IN BLOOD BY AUTOMATED COUNT: 13.1 % (ref 11.5–14.5)
GLUCOSE SERPL-MCNC: 106 MG/DL (ref 70–140)
HCT VFR BLD AUTO: 30.1 % (ref 37–48.5)
HGB BLD-MCNC: 9.8 G/DL (ref 12–16)
IMM GRANULOCYTES # BLD AUTO: 0.13 K/UL (ref 0–0.04)
IMM GRANULOCYTES NFR BLD AUTO: 1.3 % (ref 0–0.5)
LYMPHOCYTES # BLD AUTO: 1.8 K/UL (ref 1–4.8)
LYMPHOCYTES NFR BLD: 18.2 % (ref 18–48)
MCH RBC QN AUTO: 26.3 PG (ref 27–31)
MCHC RBC AUTO-ENTMCNC: 32.6 G/DL (ref 32–36)
MCV RBC AUTO: 81 FL (ref 82–98)
MONOCYTES # BLD AUTO: 0.9 K/UL (ref 0.3–1)
MONOCYTES NFR BLD: 8.6 % (ref 4–15)
NEUTROPHILS # BLD AUTO: 7.1 K/UL (ref 1.8–7.7)
NEUTROPHILS NFR BLD: 70.8 % (ref 38–73)
NRBC BLD-RTO: 0 /100 WBC
PLATELET # BLD AUTO: 154 K/UL (ref 150–450)
PMV BLD AUTO: 10.5 FL (ref 9.2–12.9)
RBC # BLD AUTO: 3.73 M/UL (ref 4–5.4)
WBC # BLD AUTO: 10.04 K/UL (ref 3.9–12.7)

## 2023-05-22 PROCEDURE — 99213 OFFICE O/P EST LOW 20 MIN: CPT | Mod: PBBFAC,TH | Performed by: OBSTETRICS & GYNECOLOGY

## 2023-05-22 PROCEDURE — 90471 IMMUNIZATION ADMIN: CPT | Mod: PBBFAC

## 2023-05-22 PROCEDURE — 82950 GLUCOSE TEST: CPT | Performed by: OBSTETRICS & GYNECOLOGY

## 2023-05-22 PROCEDURE — 36415 COLL VENOUS BLD VENIPUNCTURE: CPT | Performed by: OBSTETRICS & GYNECOLOGY

## 2023-05-22 PROCEDURE — 99213 PR OFFICE/OUTPT VISIT, EST, LEVL III, 20-29 MIN: ICD-10-PCS | Mod: TH,S$PBB,, | Performed by: OBSTETRICS & GYNECOLOGY

## 2023-05-22 PROCEDURE — 85025 COMPLETE CBC W/AUTO DIFF WBC: CPT | Performed by: OBSTETRICS & GYNECOLOGY

## 2023-05-22 PROCEDURE — 99213 OFFICE O/P EST LOW 20 MIN: CPT | Mod: TH,S$PBB,, | Performed by: OBSTETRICS & GYNECOLOGY

## 2023-05-22 PROCEDURE — 99999 PR PBB SHADOW E&M-EST. PATIENT-LVL III: ICD-10-PCS | Mod: PBBFAC,,, | Performed by: OBSTETRICS & GYNECOLOGY

## 2023-05-22 PROCEDURE — 99999 PR PBB SHADOW E&M-EST. PATIENT-LVL III: CPT | Mod: PBBFAC,,, | Performed by: OBSTETRICS & GYNECOLOGY

## 2023-05-22 PROCEDURE — 90715 TDAP VACCINE 7 YRS/> IM: CPT | Mod: PBBFAC

## 2023-05-22 NOTE — PROGRESS NOTES
Patient with no complaints. Denies vaginal bleeding or contractions. Good FM. Tdap discussed and ordered today.  Discussed fetal kick count instructions with patient to monitor fetal movement.   Will schedule repeat  at 39 WGA.  Declines permanent sterilization.   RTC in 2 weeks.    Vitals signs, FHTs, urine dip, and PE findings documented, reviewed and available in OB flow chart.       I spent a total of 20 minutes on the day of the visit.This includes face to face time and non-face to face time preparing to see the patient (eg, review of tests), Obtaining and/or reviewing separately obtained history, Documenting clinical information in the electronic or other health record, Independently interpreting resultsand communicating results to the patient/family/caregiver, or Care coordination.     Coffective counseling sheet Keep Baby Close discussed with mother. Reinforced rooming in practices, continued skin to skin, and quiet hours as requested by mother.  Encouraged mother to download Coffective mobile king if she has not already done so. Mother verbalizes understanding.

## 2023-05-23 ENCOUNTER — TELEPHONE (OUTPATIENT)
Dept: OBSTETRICS AND GYNECOLOGY | Facility: CLINIC | Age: 23
End: 2023-05-23
Payer: MEDICAID

## 2023-05-23 NOTE — TELEPHONE ENCOUNTER
RCS scheduled for 8/8/2023 with pre-op, pre-admit and post op appointments scheduled and discussed with patient.  Pt verbalized understanding.  Case request number [id number].  Daina in surgery department notified of date and time.

## 2023-05-23 NOTE — TELEPHONE ENCOUNTER
----- Message from Anna Domingo MD sent at 2023  3:24 PM CDT -----  Regarding: RE: Surg date issue  Aug 8 is fine thanks      ----- Message -----  From: Fanny Valverde LPN  Sent: 2023  10:20 AM CDT  To: Anna Domingo MD  Subject: Surg date issue                                  Dr. Domingo,   On Aug 7 MARJ/Vani have a [facility] BS scheduled.  You are working from 7:30-12 on that day.  Aug 8 you and MARJ could perform CS at 7:30.  Suggestions?      Natan,   Fanny  ----- Message -----  From: Anna Domingo MD  Sent: 2023   2:36 PM CDT  To: Fanny Valverde LPN    Please call to schedule repeat  on 23

## 2023-06-05 ENCOUNTER — ROUTINE PRENATAL (OUTPATIENT)
Dept: OBSTETRICS AND GYNECOLOGY | Facility: CLINIC | Age: 23
End: 2023-06-05
Payer: MEDICAID

## 2023-06-05 ENCOUNTER — PATIENT MESSAGE (OUTPATIENT)
Dept: OTHER | Facility: OTHER | Age: 23
End: 2023-06-05
Payer: MEDICAID

## 2023-06-05 VITALS
BODY MASS INDEX: 29.25 KG/M2 | DIASTOLIC BLOOD PRESSURE: 80 MMHG | WEIGHT: 175.81 LBS | SYSTOLIC BLOOD PRESSURE: 124 MMHG | HEART RATE: 91 BPM

## 2023-06-05 DIAGNOSIS — Z36.89 ENCOUNTER FOR ULTRASOUND TO CHECK FETAL GROWTH: ICD-10-CM

## 2023-06-05 DIAGNOSIS — O98.713 HIV DISEASE AFFECTING PREGNANCY IN THIRD TRIMESTER, ANTEPARTUM: ICD-10-CM

## 2023-06-05 DIAGNOSIS — O09.893 SUPERVISION OF OTHER HIGH RISK PREGNANCIES, THIRD TRIMESTER: Primary | ICD-10-CM

## 2023-06-05 DIAGNOSIS — Z3A.30 30 WEEKS GESTATION OF PREGNANCY: ICD-10-CM

## 2023-06-05 PROCEDURE — 99212 OFFICE O/P EST SF 10 MIN: CPT | Mod: PBBFAC,TH | Performed by: OBSTETRICS & GYNECOLOGY

## 2023-06-05 PROCEDURE — 99999 PR PBB SHADOW E&M-EST. PATIENT-LVL II: CPT | Mod: PBBFAC,,, | Performed by: OBSTETRICS & GYNECOLOGY

## 2023-06-05 PROCEDURE — 99213 OFFICE O/P EST LOW 20 MIN: CPT | Mod: TH,S$PBB,, | Performed by: OBSTETRICS & GYNECOLOGY

## 2023-06-05 PROCEDURE — 99999 PR PBB SHADOW E&M-EST. PATIENT-LVL II: ICD-10-PCS | Mod: PBBFAC,,, | Performed by: OBSTETRICS & GYNECOLOGY

## 2023-06-05 PROCEDURE — 99213 PR OFFICE/OUTPT VISIT, EST, LEVL III, 20-29 MIN: ICD-10-PCS | Mod: TH,S$PBB,, | Performed by: OBSTETRICS & GYNECOLOGY

## 2023-06-05 NOTE — PROGRESS NOTES
Patient with no complaints. Denies vaginal bleeding or contractions. Good FM. Growth scan ordered for next visit.     Vitals signs, FHTs, urine dip, and PE findings documented, reviewed and available in OB flow chart.       I spent a total of 20 minutes on the day of the visit.This includes face to face time and non-face to face time preparing to see the patient (eg, review of tests), Obtaining and/or reviewing separately obtained history, Documenting clinical information in the electronic or other health record, Independently interpreting resultsand communicating results to the patient/family/caregiver, or Care coordination.     Coffective Motivation Document discussed with mother. Reinforced benefits of breastfeeding, risk of formula, and cue based feedings. Encouraged mother to download WatchGuardective mobile king if she has not already done so. Mother verbalizes understanding.

## 2023-06-19 ENCOUNTER — PATIENT MESSAGE (OUTPATIENT)
Dept: OTHER | Facility: OTHER | Age: 23
End: 2023-06-19
Payer: MEDICAID

## 2023-06-20 ENCOUNTER — SPECIALTY PHARMACY (OUTPATIENT)
Dept: PHARMACY | Facility: CLINIC | Age: 23
End: 2023-06-20
Payer: MEDICAID

## 2023-06-20 ENCOUNTER — PROCEDURE VISIT (OUTPATIENT)
Dept: OBSTETRICS AND GYNECOLOGY | Facility: CLINIC | Age: 23
End: 2023-06-20
Payer: MEDICAID

## 2023-06-20 ENCOUNTER — TELEPHONE (OUTPATIENT)
Dept: PHARMACY | Facility: CLINIC | Age: 23
End: 2023-06-20
Payer: MEDICAID

## 2023-06-20 ENCOUNTER — TELEPHONE (OUTPATIENT)
Dept: OBSTETRICS AND GYNECOLOGY | Facility: CLINIC | Age: 23
End: 2023-06-20

## 2023-06-20 ENCOUNTER — ROUTINE PRENATAL (OUTPATIENT)
Dept: OBSTETRICS AND GYNECOLOGY | Facility: CLINIC | Age: 23
End: 2023-06-20
Payer: MEDICAID

## 2023-06-20 ENCOUNTER — TELEPHONE (OUTPATIENT)
Dept: OBSTETRICS AND GYNECOLOGY | Facility: HOSPITAL | Age: 23
End: 2023-06-20
Payer: MEDICAID

## 2023-06-20 VITALS
SYSTOLIC BLOOD PRESSURE: 118 MMHG | HEART RATE: 99 BPM | DIASTOLIC BLOOD PRESSURE: 76 MMHG | BODY MASS INDEX: 29.49 KG/M2 | WEIGHT: 177.19 LBS

## 2023-06-20 DIAGNOSIS — B20 HIV, SYMPTOMATIC: ICD-10-CM

## 2023-06-20 DIAGNOSIS — O98.719 HIV AFFECTING PREGNANCY, ANTEPARTUM: ICD-10-CM

## 2023-06-20 DIAGNOSIS — O98.713 HIV DISEASE AFFECTING PREGNANCY, THIRD TRIMESTER: ICD-10-CM

## 2023-06-20 DIAGNOSIS — Z32.01 PREGNANCY EXAMINATION OR TEST, POSITIVE RESULT: ICD-10-CM

## 2023-06-20 DIAGNOSIS — O09.293 SUPERVISION OF HIGH-RISK PREGNANCY WITH HISTORY OF TROPHOBLASTIC DISEASE IN THIRD TRIMESTER: Primary | ICD-10-CM

## 2023-06-20 DIAGNOSIS — Z3A.32 32 WEEKS GESTATION OF PREGNANCY: ICD-10-CM

## 2023-06-20 PROCEDURE — 99213 PR OFFICE/OUTPT VISIT, EST, LEVL III, 20-29 MIN: ICD-10-PCS | Mod: TH,S$PBB,, | Performed by: OBSTETRICS & GYNECOLOGY

## 2023-06-20 PROCEDURE — 99213 OFFICE O/P EST LOW 20 MIN: CPT | Mod: TH,S$PBB,, | Performed by: OBSTETRICS & GYNECOLOGY

## 2023-06-20 PROCEDURE — 99999 PR PBB SHADOW E&M-EST. PATIENT-LVL II: CPT | Mod: PBBFAC,,, | Performed by: OBSTETRICS & GYNECOLOGY

## 2023-06-20 PROCEDURE — 99212 OFFICE O/P EST SF 10 MIN: CPT | Mod: PBBFAC,TH | Performed by: OBSTETRICS & GYNECOLOGY

## 2023-06-20 PROCEDURE — 99999 PR PBB SHADOW E&M-EST. PATIENT-LVL II: ICD-10-PCS | Mod: PBBFAC,,, | Performed by: OBSTETRICS & GYNECOLOGY

## 2023-06-20 PROCEDURE — 76816 OB US FOLLOW-UP PER FETUS: CPT | Mod: PBBFAC | Performed by: OBSTETRICS & GYNECOLOGY

## 2023-06-20 PROCEDURE — 76816 US OB/GYN EXTENDED PROCEDURE (VIEWPOINT): ICD-10-PCS | Mod: 26,S$PBB,, | Performed by: OBSTETRICS & GYNECOLOGY

## 2023-06-20 RX ORDER — EMTRICITABINE AND TENOFOVIR DISOPROXIL FUMARATE 200; 300 MG/1; MG/1
1 TABLET, FILM COATED ORAL DAILY
Qty: 30 TABLET | Refills: 3 | Status: ACTIVE | OUTPATIENT
Start: 2023-06-20 | End: 2024-06-19

## 2023-06-20 NOTE — TELEPHONE ENCOUNTER
----- Message from Francis Horowitz PharmD sent at 6/20/2023  1:31 PM CDT -----  Regarding: Truvada and Isentress adherence  Hi Dr. Domingo and Team:     I have received the order for Truvada and we are beginning work up on this medication. I noticed Isentress was not sent in as well and it looked like it was documented that she was compliant to medication; however, I reached out to [facility] to see if medication had refills and the prescription from January for Isentress was not picked up. Additionally, Truvada has not been picked up since March.     I did speak with the patient and counseled her on the importance of adherence to both medications for her therapy regimen. She understood and asked me to let your office know that it was the Isentress that she has not been taking. I will also put a note on the Truvada prescription to fill with the Isentress.     Thanks,  Francis Horowitz, Aldo  Clinical Pharmacist/Supervisor   [address] [place] [address]  [phone]

## 2023-06-20 NOTE — TELEPHONE ENCOUNTER
Pt had pharmacy reach out to us to inform Dr. Domingo. Message copied and pasted below.    Francis Horowitz, Alod Domingo MD; SHERRI WINTER Staff  Hi Dr. Domingo and Team:     I have received the order for Truvada and we are beginning work up on this medication. I noticed Isentress was not sent in as well and it looked like it was documented that she was compliant to medication; however, I reached out to [facility] to see if medication had refills and the prescription from January for Isentress was not picked up. Additionally, Truvada has not been picked up since March.     I did speak with the patient and counseled her on the importance of adherence to both medications for her therapy regimen. She understood and asked me to let your office know that it was the Isentress that she has not been taking. I will also put a note on the Truvada prescription to fill with the Isentress.     Thanks,   Francis Horowitz, Aldo   Clinical Pharmacist/Supervisor   [address] [place] [id number]   [phone]

## 2023-06-20 NOTE — TELEPHONE ENCOUNTER
Truvada Rx received- Patient wants medication sent to [facility] - Also discussed picking up Isentress together with medication when picking up Truvada and importance of taking both medications for her Therap Regimen    Closing Enrollment

## 2023-06-20 NOTE — TELEPHONE ENCOUNTER
Outgoing call to patient to discuss adherence to Isentress as well as Truvada- received an order for Truvada and was able to call previous pharmacy [facility] who said the Isentress was not picked up from January and was on Hold.     Patient stated she would like medication Truvada sent to [facility].     Hello, this is Francis Horowitz, clinical pharmacist with [facility] that is part of your care team.  We have begun working on your prescription that your doctor has sent us. Our next steps include:     Working with your insurance company to obtain approval for your medication  Working with you to ensure your medication is affordable     We will be calling you along the way with updates on your medication but if you have any concerns or receive information that you would like to discuss please reach us at [phone].    Welcome call outcome: Patient/caregiver reached

## 2023-06-20 NOTE — TELEPHONE ENCOUNTER
----- Message from Christiana Shaver MA sent at 6/20/2023 11:30 AM CDT -----  Contact: self  Nidhi Willis  MRN: [id number]  Home Phone      [phone]  Work Phone      Not on file.  Mobile          [phone]    Patient Care Team:  Primary Doctor No as PCP - General  Fanny Lozano MD as PCP - Infectious Disease (Infectious Diseases)  Anna Domingo MD as Consulting Physician (Obstetrics and Gynecology)  Frantz Chan MD as Obstetrician (Obstetrics)  OB? Yes, 32w1d  What phone number can you be reached at? [phone]  Message: Needs to speak to nurse regarding medication.

## 2023-06-20 NOTE — TELEPHONE ENCOUNTER
I sent the prescription for Isentress to [facility] since that is her preferred pharmacy.  Please make sure she knows to get that medication from there since she should be taking both.

## 2023-06-20 NOTE — PROGRESS NOTES
Patient with no complaints. Denies vaginal bleeding or contractions. Good FM. Growth scan reviewed.  Pt to have serial growth scans secondary to HIV. She needs to get follow up with ID set up.  She states that she has a message in with the nurse to get an appointment scheduled.  Will reach out to Dr. Lozano.  Pt compliant with rategravir but has not been on Truvada. RTC in 2 weeks.     Vitals signs, FHTs, urine dip, and PE findings documented, reviewed and available in OB flow chart.       I spent a total of 20 minutes on the day of the visit.This includes face to face time and non-face to face time preparing to see the patient (eg, review of tests), Obtaining and/or reviewing separately obtained history, Documenting clinical information in the electronic or other health record, Independently interpreting resultsand communicating results to the patient/family/caregiver, or Care coordination.     Coffective counseling sheet Build Your Team discussed with mother. Reinforced importance of early identification of support team including champion, OB provider, pediatrician and local community resources. Encouraged mother to download Coffective mobile king if she has not already done so.  Mother verbalizes understanding. Also discussed quiet time and delayed bathing.

## 2023-06-21 NOTE — TELEPHONE ENCOUNTER
Pt was called and informed that Isentress was sent to [facility] as well. Pt informed that she should be taking both. Pt voiced understanding and stated she will  prescriptions today.

## 2023-06-27 PROBLEM — Z16.33 RESISTANCE TO ANTIVIRAL DRUG: Status: ACTIVE | Noted: 2023-06-27

## 2023-06-27 PROBLEM — Z34.93 NORMAL IUP (INTRAUTERINE PREGNANCY) ON PRENATAL ULTRASOUND, THIRD TRIMESTER: Status: ACTIVE | Noted: 2023-06-27

## 2023-06-27 PROBLEM — J45.20 MILD INTERMITTENT ASTHMA WITHOUT COMPLICATION: Status: ACTIVE | Noted: 2023-06-27

## 2023-06-27 PROBLEM — Z86.19 HISTORY OF HERPES GENITALIS: Status: ACTIVE | Noted: 2023-06-27

## 2023-07-07 ENCOUNTER — ROUTINE PRENATAL (OUTPATIENT)
Dept: OBSTETRICS AND GYNECOLOGY | Facility: CLINIC | Age: 23
End: 2023-07-07
Attending: OBSTETRICS & GYNECOLOGY
Payer: MEDICAID

## 2023-07-07 VITALS
BODY MASS INDEX: 29.79 KG/M2 | DIASTOLIC BLOOD PRESSURE: 78 MMHG | HEART RATE: 112 BPM | SYSTOLIC BLOOD PRESSURE: 122 MMHG | WEIGHT: 179 LBS

## 2023-07-07 DIAGNOSIS — O09.293 SUPERVISION OF HIGH-RISK PREGNANCY WITH HISTORY OF TROPHOBLASTIC DISEASE IN THIRD TRIMESTER: Primary | ICD-10-CM

## 2023-07-07 DIAGNOSIS — O98.713 HIV DISEASE AFFECTING PREGNANCY, THIRD TRIMESTER: ICD-10-CM

## 2023-07-07 PROCEDURE — 99213 PR OFFICE/OUTPT VISIT, EST, LEVL III, 20-29 MIN: ICD-10-PCS | Mod: TH,S$PBB,, | Performed by: OBSTETRICS & GYNECOLOGY

## 2023-07-07 PROCEDURE — 99999 PR PBB SHADOW E&M-EST. PATIENT-LVL II: ICD-10-PCS | Mod: PBBFAC,,, | Performed by: OBSTETRICS & GYNECOLOGY

## 2023-07-07 PROCEDURE — 99212 OFFICE O/P EST SF 10 MIN: CPT | Mod: PBBFAC,TH | Performed by: OBSTETRICS & GYNECOLOGY

## 2023-07-07 PROCEDURE — 99213 OFFICE O/P EST LOW 20 MIN: CPT | Mod: TH,S$PBB,, | Performed by: OBSTETRICS & GYNECOLOGY

## 2023-07-07 PROCEDURE — 99999 PR PBB SHADOW E&M-EST. PATIENT-LVL II: CPT | Mod: PBBFAC,,, | Performed by: OBSTETRICS & GYNECOLOGY

## 2023-07-10 ENCOUNTER — PATIENT MESSAGE (OUTPATIENT)
Dept: OTHER | Facility: OTHER | Age: 23
End: 2023-07-10
Payer: MEDICAID

## 2023-07-19 ENCOUNTER — ROUTINE PRENATAL (OUTPATIENT)
Dept: OBSTETRICS AND GYNECOLOGY | Facility: CLINIC | Age: 23
End: 2023-07-19
Payer: MEDICAID

## 2023-07-19 VITALS
WEIGHT: 182.63 LBS | HEART RATE: 94 BPM | BODY MASS INDEX: 30.39 KG/M2 | DIASTOLIC BLOOD PRESSURE: 88 MMHG | SYSTOLIC BLOOD PRESSURE: 136 MMHG

## 2023-07-19 DIAGNOSIS — O09.93 SUPERVISION OF HIGH-RISK PREGNANCY, THIRD TRIMESTER: Primary | ICD-10-CM

## 2023-07-19 DIAGNOSIS — O34.219 HISTORY OF CESAREAN DELIVERY AFFECTING PREGNANCY: ICD-10-CM

## 2023-07-19 DIAGNOSIS — O98.313 GENITAL HERPES AFFECTING PREGNANCY IN THIRD TRIMESTER: ICD-10-CM

## 2023-07-19 DIAGNOSIS — A60.09 GENITAL HERPES AFFECTING PREGNANCY IN THIRD TRIMESTER: ICD-10-CM

## 2023-07-19 DIAGNOSIS — O98.713 HIV DISEASE AFFECTING PREGNANCY, THIRD TRIMESTER: ICD-10-CM

## 2023-07-19 DIAGNOSIS — Z3A.36 36 WEEKS GESTATION OF PREGNANCY: ICD-10-CM

## 2023-07-19 PROCEDURE — 99213 OFFICE O/P EST LOW 20 MIN: CPT | Mod: TH,S$PBB,, | Performed by: OBSTETRICS & GYNECOLOGY

## 2023-07-19 PROCEDURE — 99212 OFFICE O/P EST SF 10 MIN: CPT | Mod: PBBFAC,TH | Performed by: OBSTETRICS & GYNECOLOGY

## 2023-07-19 PROCEDURE — 87081 CULTURE SCREEN ONLY: CPT | Performed by: OBSTETRICS & GYNECOLOGY

## 2023-07-19 PROCEDURE — 99999 PR PBB SHADOW E&M-EST. PATIENT-LVL II: ICD-10-PCS | Mod: PBBFAC,,, | Performed by: OBSTETRICS & GYNECOLOGY

## 2023-07-19 PROCEDURE — 99999 PR PBB SHADOW E&M-EST. PATIENT-LVL II: CPT | Mod: PBBFAC,,, | Performed by: OBSTETRICS & GYNECOLOGY

## 2023-07-19 PROCEDURE — 99213 PR OFFICE/OUTPT VISIT, EST, LEVL III, 20-29 MIN: ICD-10-PCS | Mod: TH,S$PBB,, | Performed by: OBSTETRICS & GYNECOLOGY

## 2023-07-19 RX ORDER — VALACYCLOVIR HYDROCHLORIDE 500 MG/1
500 TABLET, FILM COATED ORAL 2 TIMES DAILY
Qty: 60 TABLET | Refills: 0 | Status: SHIPPED | OUTPATIENT
Start: 2023-07-19 | End: 2024-07-18

## 2023-07-19 NOTE — PROGRESS NOTES
Patient with no complaints except occasional contractions. Denies vaginal bleeding or regular contractions. Good FM. GBS collected today. Labor precautions discused with patient. RTC in 1 week.     HIV: s/p ID appointment. Compliant with all meds.  AZT prior to ; growth scan scheduled  H/o HSV: start Valtrex prophy BID      Vitals signs, FHTs, urine dip, and PE findings documented, reviewed and available in OB flow chart.       I spent a total of 20 minutes on the day of the visit.This includes face to face time and non-face to face time preparing to see the patient (eg, review of tests), Obtaining and/or reviewing separately obtained history, Documenting clinical information in the electronic or other health record, Independently interpreting resultsand communicating results to the patient/family/caregiver, or Care coordination.

## 2023-07-21 ENCOUNTER — PROCEDURE VISIT (OUTPATIENT)
Dept: OBSTETRICS AND GYNECOLOGY | Facility: CLINIC | Age: 23
End: 2023-07-21
Payer: MEDICAID

## 2023-07-21 DIAGNOSIS — O09.93 SUPERVISION OF HIGH-RISK PREGNANCY, THIRD TRIMESTER: ICD-10-CM

## 2023-07-21 DIAGNOSIS — O98.713 HIV DISEASE AFFECTING PREGNANCY, THIRD TRIMESTER: ICD-10-CM

## 2023-07-21 PROCEDURE — 76816 US OB/GYN EXTENDED PROCEDURE (VIEWPOINT): ICD-10-PCS | Mod: 26,S$PBB,, | Performed by: OBSTETRICS & GYNECOLOGY

## 2023-07-21 PROCEDURE — 76816 OB US FOLLOW-UP PER FETUS: CPT | Mod: PBBFAC | Performed by: OBSTETRICS & GYNECOLOGY

## 2023-07-24 LAB — BACTERIA SPEC AEROBE CULT: NORMAL

## 2023-07-26 ENCOUNTER — ROUTINE PRENATAL (OUTPATIENT)
Dept: OBSTETRICS AND GYNECOLOGY | Facility: CLINIC | Age: 23
End: 2023-07-26
Payer: MEDICAID

## 2023-07-26 VITALS
SYSTOLIC BLOOD PRESSURE: 132 MMHG | HEART RATE: 82 BPM | WEIGHT: 188.19 LBS | BODY MASS INDEX: 31.32 KG/M2 | DIASTOLIC BLOOD PRESSURE: 88 MMHG

## 2023-07-26 DIAGNOSIS — O34.219 HISTORY OF CESAREAN DELIVERY AFFECTING PREGNANCY: ICD-10-CM

## 2023-07-26 DIAGNOSIS — Z3A.37 37 WEEKS GESTATION OF PREGNANCY: ICD-10-CM

## 2023-07-26 DIAGNOSIS — O98.713 HIV DISEASE AFFECTING PREGNANCY IN THIRD TRIMESTER: ICD-10-CM

## 2023-07-26 DIAGNOSIS — O09.93 SUPERVISION OF HIGH-RISK PREGNANCY, THIRD TRIMESTER: Primary | ICD-10-CM

## 2023-07-26 PROCEDURE — 99213 OFFICE O/P EST LOW 20 MIN: CPT | Mod: TH,S$PBB,, | Performed by: OBSTETRICS & GYNECOLOGY

## 2023-07-26 PROCEDURE — 99212 OFFICE O/P EST SF 10 MIN: CPT | Mod: PBBFAC,TH | Performed by: OBSTETRICS & GYNECOLOGY

## 2023-07-26 PROCEDURE — 99999 PR PBB SHADOW E&M-EST. PATIENT-LVL II: ICD-10-PCS | Mod: PBBFAC,,, | Performed by: OBSTETRICS & GYNECOLOGY

## 2023-07-26 PROCEDURE — 99213 PR OFFICE/OUTPT VISIT, EST, LEVL III, 20-29 MIN: ICD-10-PCS | Mod: TH,S$PBB,, | Performed by: OBSTETRICS & GYNECOLOGY

## 2023-07-26 PROCEDURE — 99999 PR PBB SHADOW E&M-EST. PATIENT-LVL II: CPT | Mod: PBBFAC,,, | Performed by: OBSTETRICS & GYNECOLOGY

## 2023-07-26 NOTE — PROGRESS NOTES
Patient with no complaints. Denies vaginal bleeding or contractions. Good FM.   Pt compliant with all HIV meds.  [facility]     Vitals signs, FHTs, urine dip, and PE findings documented, reviewed and available in OB flow chart.       I spent a total of 20 minutes on the day of the visit.This includes face to face time and non-face to face time preparing to see the patient (eg, review of tests), Obtaining and/or reviewing separately obtained history, Documenting clinical information in the electronic or other health record, Independently interpreting resultsand communicating results to the patient/family/caregiver, or Care coordination.

## 2023-07-31 ENCOUNTER — ANESTHESIA EVENT (OUTPATIENT)
Dept: SURGERY | Facility: HOSPITAL | Age: 23
End: 2023-07-31
Payer: MEDICAID

## 2023-07-31 ENCOUNTER — ANESTHESIA (OUTPATIENT)
Dept: SURGERY | Facility: HOSPITAL | Age: 23
End: 2023-07-31
Payer: MEDICAID

## 2023-07-31 ENCOUNTER — HOSPITAL ENCOUNTER (INPATIENT)
Facility: HOSPITAL | Age: 23
LOS: 2 days | Discharge: HOME | End: 2023-08-02
Attending: OBSTETRICS & GYNECOLOGY | Admitting: OBSTETRICS & GYNECOLOGY
Payer: MEDICAID

## 2023-07-31 DIAGNOSIS — D62 POSTOPERATIVE ANEMIA DUE TO ACUTE BLOOD LOSS: ICD-10-CM

## 2023-07-31 DIAGNOSIS — Z98.891 S/P REPEAT LOW TRANSVERSE C-SECTION: ICD-10-CM

## 2023-07-31 DIAGNOSIS — Z37.9 NORMAL LABOR: ICD-10-CM

## 2023-07-31 DIAGNOSIS — O21.9 NAUSEA AND VOMITING IN PREGNANCY: ICD-10-CM

## 2023-07-31 DIAGNOSIS — O34.219 HISTORY OF CESAREAN DELIVERY AFFECTING PREGNANCY: Primary | ICD-10-CM

## 2023-07-31 DIAGNOSIS — O98.713 HIV DISEASE AFFECTING PREGNANCY IN THIRD TRIMESTER: ICD-10-CM

## 2023-07-31 DIAGNOSIS — B20 HIV, SYMPTOMATIC: ICD-10-CM

## 2023-07-31 LAB
ABO + RH BLD: NORMAL
BASOPHILS # BLD AUTO: 0.02 K/UL (ref 0–0.2)
BASOPHILS NFR BLD: 0.2 % (ref 0–1.9)
BLD GP AB SCN CELLS X3 SERPL QL: NORMAL
DIFFERENTIAL METHOD: ABNORMAL
EOSINOPHIL # BLD AUTO: 0.1 K/UL (ref 0–0.5)
EOSINOPHIL NFR BLD: 0.6 % (ref 0–8)
ERYTHROCYTE [DISTWIDTH] IN BLOOD BY AUTOMATED COUNT: 14.6 % (ref 11.5–14.5)
HCT VFR BLD AUTO: 32.2 % (ref 37–48.5)
HGB BLD-MCNC: 10.5 G/DL (ref 12–16)
IMM GRANULOCYTES # BLD AUTO: 0.08 K/UL (ref 0–0.04)
IMM GRANULOCYTES NFR BLD AUTO: 0.8 % (ref 0–0.5)
LYMPHOCYTES # BLD AUTO: 2.1 K/UL (ref 1–4.8)
LYMPHOCYTES NFR BLD: 21.1 % (ref 18–48)
MCH RBC QN AUTO: 24.5 PG (ref 27–31)
MCHC RBC AUTO-ENTMCNC: 32.6 G/DL (ref 32–36)
MCV RBC AUTO: 75 FL (ref 82–98)
MONOCYTES # BLD AUTO: 1.1 K/UL (ref 0.3–1)
MONOCYTES NFR BLD: 10.7 % (ref 4–15)
NEUTROPHILS # BLD AUTO: 6.7 K/UL (ref 1.8–7.7)
NEUTROPHILS NFR BLD: 66.6 % (ref 38–73)
NRBC BLD-RTO: 0 /100 WBC
PLATELET # BLD AUTO: 183 K/UL (ref 150–450)
PMV BLD AUTO: 10.3 FL (ref 9.2–12.9)
RBC # BLD AUTO: 4.28 M/UL (ref 4–5.4)
RPR SER QL: NORMAL
SPECIMEN OUTDATE: NORMAL
WBC # BLD AUTO: 10.05 K/UL (ref 3.9–12.7)

## 2023-07-31 PROCEDURE — 36000709 HC OR TIME LEV III EA ADD 15 MIN: Performed by: OBSTETRICS & GYNECOLOGY

## 2023-07-31 PROCEDURE — 59514 PR CESAREAN DELIVERY ONLY: ICD-10-PCS | Mod: AT,,, | Performed by: OBSTETRICS & GYNECOLOGY

## 2023-07-31 PROCEDURE — 25000003 PHARM REV CODE 250: Performed by: OBSTETRICS & GYNECOLOGY

## 2023-07-31 PROCEDURE — 85025 COMPLETE CBC W/AUTO DIFF WBC: CPT | Performed by: OBSTETRICS & GYNECOLOGY

## 2023-07-31 PROCEDURE — 37000008 HC ANESTHESIA 1ST 15 MINUTES: Performed by: OBSTETRICS & GYNECOLOGY

## 2023-07-31 PROCEDURE — 86592 SYPHILIS TEST NON-TREP QUAL: CPT | Performed by: OBSTETRICS & GYNECOLOGY

## 2023-07-31 PROCEDURE — 59514 CESAREAN DELIVERY ONLY: CPT | Mod: AT,80,, | Performed by: STUDENT IN AN ORGANIZED HEALTH CARE EDUCATION/TRAINING PROGRAM

## 2023-07-31 PROCEDURE — 01961 ANES CESAREAN DELIVERY ONLY: CPT | Mod: QZ | Performed by: NURSE ANESTHETIST, CERTIFIED REGISTERED

## 2023-07-31 PROCEDURE — 59514 CESAREAN DELIVERY ONLY: CPT | Mod: AT,,, | Performed by: OBSTETRICS & GYNECOLOGY

## 2023-07-31 PROCEDURE — 99211 OFF/OP EST MAY X REQ PHY/QHP: CPT | Mod: 25

## 2023-07-31 PROCEDURE — 59514 PR CESAREAN DELIVERY ONLY: ICD-10-PCS | Mod: AT,80,, | Performed by: STUDENT IN AN ORGANIZED HEALTH CARE EDUCATION/TRAINING PROGRAM

## 2023-07-31 PROCEDURE — 59514AH PRA REAN DELIVERY ONLY: Mod: QZ | Performed by: NURSE ANESTHETIST, CERTIFIED REGISTERED

## 2023-07-31 PROCEDURE — 63600175 PHARM REV CODE 636 W HCPCS: Performed by: NURSE ANESTHETIST, CERTIFIED REGISTERED

## 2023-07-31 PROCEDURE — 63600175 PHARM REV CODE 636 W HCPCS: Performed by: OBSTETRICS & GYNECOLOGY

## 2023-07-31 PROCEDURE — 37000009 HC ANESTHESIA EA ADD 15 MINS: Performed by: OBSTETRICS & GYNECOLOGY

## 2023-07-31 PROCEDURE — 36415 COLL VENOUS BLD VENIPUNCTURE: CPT | Performed by: OBSTETRICS & GYNECOLOGY

## 2023-07-31 PROCEDURE — 71000033 HC RECOVERY, INTIAL HOUR: Performed by: OBSTETRICS & GYNECOLOGY

## 2023-07-31 PROCEDURE — 25000003 PHARM REV CODE 250: Performed by: NURSE ANESTHETIST, CERTIFIED REGISTERED

## 2023-07-31 PROCEDURE — 86920 COMPATIBILITY TEST SPIN: CPT | Performed by: OBSTETRICS & GYNECOLOGY

## 2023-07-31 PROCEDURE — 59025 FETAL NON-STRESS TEST: CPT | Mod: 76

## 2023-07-31 PROCEDURE — 36000708 HC OR TIME LEV III 1ST 15 MIN: Performed by: OBSTETRICS & GYNECOLOGY

## 2023-07-31 PROCEDURE — 86900 BLOOD TYPING SEROLOGIC ABO: CPT | Performed by: OBSTETRICS & GYNECOLOGY

## 2023-07-31 PROCEDURE — 11000001 HC ACUTE MED/SURG PRIVATE ROOM

## 2023-07-31 PROCEDURE — 51702 INSERT TEMP BLADDER CATH: CPT

## 2023-07-31 RX ORDER — PHENYLEPHRINE HYDROCHLORIDE 10 MG/ML
INJECTION INTRAVENOUS
Status: DISCONTINUED | OUTPATIENT
Start: 2023-07-31 | End: 2023-07-31

## 2023-07-31 RX ORDER — SODIUM CITRATE AND CITRIC ACID MONOHYDRATE 334; 500 MG/5ML; MG/5ML
30 SOLUTION ORAL
Status: COMPLETED | OUTPATIENT
Start: 2023-07-31 | End: 2023-07-31

## 2023-07-31 RX ORDER — SODIUM CHLORIDE, SODIUM LACTATE, POTASSIUM CHLORIDE, CALCIUM CHLORIDE 600; 310; 30; 20 MG/100ML; MG/100ML; MG/100ML; MG/100ML
INJECTION, SOLUTION INTRAVENOUS CONTINUOUS PRN
Status: DISCONTINUED | OUTPATIENT
Start: 2023-07-31 | End: 2023-07-31

## 2023-07-31 RX ORDER — MISOPROSTOL 200 UG/1
800 TABLET ORAL ONCE AS NEEDED
Status: DISCONTINUED | OUTPATIENT
Start: 2023-07-31 | End: 2023-08-02 | Stop reason: HOSPADM

## 2023-07-31 RX ORDER — ACETAMINOPHEN 325 MG/1
650 TABLET ORAL
Status: DISCONTINUED | OUTPATIENT
Start: 2023-07-31 | End: 2023-08-02 | Stop reason: HOSPADM

## 2023-07-31 RX ORDER — AMOXICILLIN 250 MG
1 CAPSULE ORAL NIGHTLY PRN
Status: DISCONTINUED | OUTPATIENT
Start: 2023-07-31 | End: 2023-08-02 | Stop reason: HOSPADM

## 2023-07-31 RX ORDER — METHYLERGONOVINE MALEATE 0.2 MG/ML
200 INJECTION INTRAVENOUS ONCE AS NEEDED
Status: DISCONTINUED | OUTPATIENT
Start: 2023-07-31 | End: 2023-08-02 | Stop reason: HOSPADM

## 2023-07-31 RX ORDER — SODIUM CHLORIDE, SODIUM LACTATE, POTASSIUM CHLORIDE, CALCIUM CHLORIDE 600; 310; 30; 20 MG/100ML; MG/100ML; MG/100ML; MG/100ML
INJECTION, SOLUTION INTRAVENOUS CONTINUOUS
Status: DISCONTINUED | OUTPATIENT
Start: 2023-07-31 | End: 2023-08-02 | Stop reason: HOSPADM

## 2023-07-31 RX ORDER — DIPHENHYDRAMINE HYDROCHLORIDE 50 MG/ML
12.5 INJECTION INTRAMUSCULAR; INTRAVENOUS
Status: DISCONTINUED | OUTPATIENT
Start: 2023-07-31 | End: 2023-08-02 | Stop reason: HOSPADM

## 2023-07-31 RX ORDER — PRENATAL WITH FERROUS FUM AND FOLIC ACID 3080; 920; 120; 400; 22; 1.84; 3; 20; 10; 1; 12; 200; 27; 25; 2 [IU]/1; [IU]/1; MG/1; [IU]/1; MG/1; MG/1; MG/1; MG/1; MG/1; MG/1; UG/1; MG/1; MG/1; MG/1; MG/1
1 TABLET ORAL DAILY
Status: DISCONTINUED | OUTPATIENT
Start: 2023-08-01 | End: 2023-08-02 | Stop reason: HOSPADM

## 2023-07-31 RX ORDER — OXYCODONE HYDROCHLORIDE 5 MG/1
5 TABLET ORAL EVERY 4 HOURS PRN
Status: DISCONTINUED | OUTPATIENT
Start: 2023-07-31 | End: 2023-08-02 | Stop reason: HOSPADM

## 2023-07-31 RX ORDER — FAMOTIDINE 10 MG/ML
20 INJECTION INTRAVENOUS
Status: COMPLETED | OUTPATIENT
Start: 2023-07-31 | End: 2023-07-31

## 2023-07-31 RX ORDER — NALBUPHINE HYDROCHLORIDE 10 MG/ML
5 INJECTION, SOLUTION INTRAMUSCULAR; INTRAVENOUS; SUBCUTANEOUS ONCE AS NEEDED
Status: COMPLETED | OUTPATIENT
Start: 2023-07-31 | End: 2023-07-31

## 2023-07-31 RX ORDER — OXYCODONE HYDROCHLORIDE 5 MG/1
10 TABLET ORAL EVERY 4 HOURS PRN
Status: DISCONTINUED | OUTPATIENT
Start: 2023-07-31 | End: 2023-08-02 | Stop reason: HOSPADM

## 2023-07-31 RX ORDER — DIPHENHYDRAMINE HCL 25 MG
25 CAPSULE ORAL EVERY 6 HOURS PRN
Status: DISCONTINUED | OUTPATIENT
Start: 2023-07-31 | End: 2023-08-02 | Stop reason: HOSPADM

## 2023-07-31 RX ORDER — EMTRICITABINE AND TENOFOVIR DISOPROXIL FUMARATE 200; 300 MG/1; MG/1
1 TABLET, FILM COATED ORAL DAILY
Status: DISCONTINUED | OUTPATIENT
Start: 2023-08-01 | End: 2023-08-01

## 2023-07-31 RX ORDER — OXYTOCIN/RINGER'S LACTATE 30/500 ML
PLASTIC BAG, INJECTION (ML) INTRAVENOUS
Status: DISCONTINUED | OUTPATIENT
Start: 2023-07-31 | End: 2023-07-31

## 2023-07-31 RX ORDER — ONDANSETRON HYDROCHLORIDE 2 MG/ML
INJECTION, SOLUTION INTRAMUSCULAR; INTRAVENOUS
Status: DISCONTINUED | OUTPATIENT
Start: 2023-07-31 | End: 2023-07-31

## 2023-07-31 RX ORDER — DOCUSATE SODIUM 100 MG/1
200 CAPSULE, LIQUID FILLED ORAL 2 TIMES DAILY
Status: DISCONTINUED | OUTPATIENT
Start: 2023-07-31 | End: 2023-08-02 | Stop reason: HOSPADM

## 2023-07-31 RX ORDER — SIMETHICONE 80 MG
1 TABLET,CHEWABLE ORAL EVERY 6 HOURS PRN
Status: DISCONTINUED | OUTPATIENT
Start: 2023-07-31 | End: 2023-08-02 | Stop reason: HOSPADM

## 2023-07-31 RX ORDER — TRANEXAMIC ACID 10 MG/ML
1000 INJECTION, SOLUTION INTRAVENOUS ONCE AS NEEDED
Status: DISCONTINUED | OUTPATIENT
Start: 2023-07-31 | End: 2023-08-02 | Stop reason: HOSPADM

## 2023-07-31 RX ORDER — ONDANSETRON 2 MG/ML
4 INJECTION INTRAMUSCULAR; INTRAVENOUS EVERY 6 HOURS PRN
Status: DISCONTINUED | OUTPATIENT
Start: 2023-07-31 | End: 2023-08-02 | Stop reason: HOSPADM

## 2023-07-31 RX ORDER — KETOROLAC TROMETHAMINE 30 MG/ML
30 INJECTION, SOLUTION INTRAMUSCULAR; INTRAVENOUS
Status: COMPLETED | OUTPATIENT
Start: 2023-07-31 | End: 2023-08-01

## 2023-07-31 RX ORDER — CEFAZOLIN SODIUM 1 G/3ML
INJECTION, POWDER, FOR SOLUTION INTRAMUSCULAR; INTRAVENOUS
Status: DISCONTINUED | OUTPATIENT
Start: 2023-07-31 | End: 2023-07-31

## 2023-07-31 RX ORDER — CARBOPROST TROMETHAMINE 250 UG/ML
250 INJECTION, SOLUTION INTRAMUSCULAR
Status: DISCONTINUED | OUTPATIENT
Start: 2023-07-31 | End: 2023-08-02 | Stop reason: HOSPADM

## 2023-07-31 RX ORDER — OXYTOCIN/RINGER'S LACTATE 30/500 ML
95 PLASTIC BAG, INJECTION (ML) INTRAVENOUS CONTINUOUS
Status: DISCONTINUED | OUTPATIENT
Start: 2023-07-31 | End: 2023-07-31

## 2023-07-31 RX ORDER — DEXAMETHASONE SODIUM PHOSPHATE 4 MG/ML
INJECTION, SOLUTION INTRA-ARTICULAR; INTRALESIONAL; INTRAMUSCULAR; INTRAVENOUS; SOFT TISSUE
Status: DISCONTINUED | OUTPATIENT
Start: 2023-07-31 | End: 2023-07-31

## 2023-07-31 RX ORDER — ONDANSETRON 8 MG/1
8 TABLET, ORALLY DISINTEGRATING ORAL EVERY 8 HOURS PRN
Status: DISCONTINUED | OUTPATIENT
Start: 2023-07-31 | End: 2023-08-02 | Stop reason: HOSPADM

## 2023-07-31 RX ORDER — PROCHLORPERAZINE EDISYLATE 5 MG/ML
5 INJECTION INTRAMUSCULAR; INTRAVENOUS EVERY 6 HOURS PRN
Status: DISCONTINUED | OUTPATIENT
Start: 2023-07-31 | End: 2023-08-02 | Stop reason: HOSPADM

## 2023-07-31 RX ORDER — ENOXAPARIN SODIUM 100 MG/ML
40 INJECTION SUBCUTANEOUS EVERY 24 HOURS
Status: DISCONTINUED | OUTPATIENT
Start: 2023-08-01 | End: 2023-08-02 | Stop reason: HOSPADM

## 2023-07-31 RX ORDER — IBUPROFEN 800 MG/1
800 TABLET ORAL
Status: DISCONTINUED | OUTPATIENT
Start: 2023-08-01 | End: 2023-08-02 | Stop reason: HOSPADM

## 2023-07-31 RX ORDER — FENTANYL CITRATE 50 UG/ML
INJECTION, SOLUTION INTRAMUSCULAR; INTRAVENOUS
Status: DISCONTINUED | OUTPATIENT
Start: 2023-07-31 | End: 2023-07-31

## 2023-07-31 RX ORDER — HYDROCORTISONE 25 MG/G
CREAM TOPICAL 3 TIMES DAILY PRN
Status: DISCONTINUED | OUTPATIENT
Start: 2023-07-31 | End: 2023-08-02 | Stop reason: HOSPADM

## 2023-07-31 RX ORDER — BUPIVACAINE HYDROCHLORIDE 7.5 MG/ML
INJECTION, SOLUTION INTRASPINAL
Status: DISCONTINUED | OUTPATIENT
Start: 2023-07-31 | End: 2023-07-31

## 2023-07-31 RX ORDER — ACETAMINOPHEN 500 MG
1000 TABLET ORAL
Status: COMPLETED | OUTPATIENT
Start: 2023-07-31 | End: 2023-07-31

## 2023-07-31 RX ADMIN — SODIUM CHLORIDE, POTASSIUM CHLORIDE, SODIUM LACTATE AND CALCIUM CHLORIDE: 600; 310; 30; 20 INJECTION, SOLUTION INTRAVENOUS at 01:07

## 2023-07-31 RX ADMIN — OXYCODONE HYDROCHLORIDE 10 MG: 5 TABLET ORAL at 07:07

## 2023-07-31 RX ADMIN — FENTANYL CITRATE 25 MCG: 0.05 INJECTION, SOLUTION INTRAMUSCULAR; INTRAVENOUS at 04:07

## 2023-07-31 RX ADMIN — FENTANYL CITRATE 75 MCG: 0.05 INJECTION, SOLUTION INTRAMUSCULAR; INTRAVENOUS at 04:07

## 2023-07-31 RX ADMIN — SODIUM CHLORIDE, SODIUM LACTATE, POTASSIUM CHLORIDE, AND CALCIUM CHLORIDE: .6; .31; .03; .02 INJECTION, SOLUTION INTRAVENOUS at 04:07

## 2023-07-31 RX ADMIN — ZIDOVUDINE 171 MG: 10 INJECTION, SOLUTION INTRAVENOUS at 01:07

## 2023-07-31 RX ADMIN — DOCUSATE SODIUM 200 MG: 100 CAPSULE, LIQUID FILLED ORAL at 08:07

## 2023-07-31 RX ADMIN — FAMOTIDINE 20 MG: 10 INJECTION INTRAVENOUS at 03:07

## 2023-07-31 RX ADMIN — KETOROLAC TROMETHAMINE 30 MG: 30 INJECTION, SOLUTION INTRAMUSCULAR; INTRAVENOUS at 06:07

## 2023-07-31 RX ADMIN — KETOROLAC TROMETHAMINE 30 MG: 30 INJECTION, SOLUTION INTRAMUSCULAR; INTRAVENOUS at 10:07

## 2023-07-31 RX ADMIN — DEXAMETHASONE SODIUM PHOSPHATE 4 MG: 4 INJECTION, SOLUTION INTRAMUSCULAR; INTRAVENOUS at 04:07

## 2023-07-31 RX ADMIN — NALBUPHINE HYDROCHLORIDE 5 MG: 10 INJECTION, SOLUTION INTRAMUSCULAR; INTRAVENOUS; SUBCUTANEOUS at 11:07

## 2023-07-31 RX ADMIN — ACETAMINOPHEN 1000 MG: 500 TABLET ORAL at 03:07

## 2023-07-31 RX ADMIN — PHENYLEPHRINE HYDROCHLORIDE 100 MCG: 10 INJECTION INTRAVENOUS at 04:07

## 2023-07-31 RX ADMIN — Medication 299 ML: at 04:07

## 2023-07-31 RX ADMIN — SODIUM CITRATE AND CITRIC ACID MONOHYDRATE 30 ML: 500; 334 SOLUTION ORAL at 03:07

## 2023-07-31 RX ADMIN — BUPIVACAINE HYDROCHLORIDE IN DEXTROSE 1.4 ML: 7.5 INJECTION, SOLUTION SUBARACHNOID at 04:07

## 2023-07-31 RX ADMIN — ZIDOVUDINE 1 MG/KG/HR: 10 INJECTION, SOLUTION INTRAVENOUS at 02:07

## 2023-07-31 RX ADMIN — Medication 1 ML: at 04:07

## 2023-07-31 RX ADMIN — DIPHENHYDRAMINE HYDROCHLORIDE 25 MG: 25 CAPSULE ORAL at 11:07

## 2023-07-31 RX ADMIN — CEFAZOLIN 2 G: 330 INJECTION, POWDER, FOR SOLUTION INTRAMUSCULAR; INTRAVENOUS at 04:07

## 2023-07-31 RX ADMIN — ONDANSETRON 8 MG: 2 INJECTION INTRAMUSCULAR; INTRAVENOUS at 04:07

## 2023-07-31 RX ADMIN — ACETAMINOPHEN 650 MG: 325 TABLET ORAL at 08:07

## 2023-07-31 RX ADMIN — PHENYLEPHRINE HYDROCHLORIDE 200 MCG: 10 INJECTION INTRAVENOUS at 04:07

## 2023-07-31 NOTE — HPI
Pt is a  @ 38 0/7 WGA who presents in labor with contractions every 2-5 minutes.  Pt has a h/o a  and desires repeat. Pregnancy complicated by HIV infection, h/o genital HSV.

## 2023-07-31 NOTE — ANESTHESIA PREPROCEDURE EVALUATION
07/31/2023  Nidhi Willis is a 23 y.o., female.      Pre-op Assessment    I have reviewed the Patient Summary Reports.     I have reviewed the Nursing Notes.    I have reviewed the Medications.     Review of Systems  Anesthesia Hx:  No problems with previous Anesthesia    Social:  Non-Smoker, No Alcohol Use    Hematology/Oncology:     Oncology Normal    -- Immunodeficiency Disorder: Hematology Comments: HIV+    EENT/Dental:EENT/Dental Normal   Cardiovascular:  Cardiovascular Normal Exercise tolerance: good     Pulmonary:  Pulmonary Normal    Renal/:  Renal/ Normal     Hepatic/GI:  Hepatic/GI Normal    Musculoskeletal:  Musculoskeletal Normal    Neurological:   Seizures    Endocrine:  Endocrine Normal    Dermatological:  Skin Normal    Psych:  Psychiatric Normal           Physical Exam  General: Well nourished    Airway:  Mallampati: II / II  Mouth Opening: Normal  Tongue: Normal        Anesthesia Plan  Type of Anesthesia, risks & benefits discussed:    Anesthesia Type: Spinal, Epidural, Gen ETT  Intra-op Monitoring Plan: Standard ASA Monitors  Post Op Pain Control Plan: multimodal analgesia  Induction:  IV  Airway Plan: Direct, Post-Induction  Informed Consent: Informed consent signed with the Patient and all parties understand the risks and agree with anesthesia plan.  All questions answered. Patient consented to blood products? Yes  ASA Score: 2  Day of Surgery Review of History & Physical: H&P Update referred to the surgeon/provider.I have interviewed and examined the patient. I have reviewed the patient's H&P dated: 7/31/23. There are no significant changes.     Ready For Surgery From Anesthesia Perspective.     .

## 2023-07-31 NOTE — L&D DELIVERY NOTE
[facility] - Labor & Delivery   Section   Operative Note    SUMMARY     Date of Procedure: 2023     Procedure: Procedure(s) (LRB):  REPEAT  SECTION (N/A)    Surgeon(s) and Role:     * Anna Domingo MD - Primary     * Haily Robbins MD - Assisting        Pre-Operative Diagnosis: History of  delivery affecting pregnancy [O34.219]  Normal labor  HIV affecting pregnancy    Post-Operative Diagnosis: same    Anesthesia: Spinal    Technical Procedures Used: 1LTCS                  Specimens:   Specimen (24h ago, onward)      None            QBL: pending, see EPIC    DVT prophylaxis: Bilateral sequential compression devices    Perioperative antibiotics: Ancef    Operative Findings: viable female infant in the vertex presentation. Apgars 8 at 1 minute and 9 at 5 minutes. Amniotic fluid meconium. Normal appearing uterus, bilateral tubes, and ovaries.      OPERATIVE NOTE:  The patient was taken to the operating room were spinal anesthesia was found to be adequate. She was prepped and draped in the normal sterile fashion in the dorsal supine position. A allen catheter was in place draining clear urine. A pfannenstiel skin incision was then made with the knife and carried through to the underlying layer of fascia with the bovie. The fascia was incised in the midline. The fascial incision was extended laterally with the curved yang scissors. The superior aspect of the incision was grasped with two kocher clamps and elevated up. The superior aspect of the fascial incision was sharply dissected off. The same technique was used to take down the inferior aspect of the fascia. The muscle was  in the midline and the peritoneum was identified. The peritoneum was elevated up with hemastats and entered sharply. The peritoneal incision was then extended superiorly and inferiorly with good visualization of the surrounding tissue. The bladder blade was inserted and the vesicouterine  peritoneum was identified and a bladder flap was made. A low transverse incision was then made on the uterus and the incision was bluntly extended. The infant's head was grasped and brought to the incision. The assistant then applied gentle fundal pressure and the infant was delivered without difficulty. The nose and mouth was suctioned and the cord was clamped and cut. The infant was then handed off to the awaiting nursery personnel. Cord blood was obtained for the infant's blood type. The placenta was removed, the uterus was exteriorized and cleaned of all clot and debris. The uterine incision was repaired with #1 PDS in a running locked fashion. A second layer was used for imbrication. A 2-0 Vicryl was used to close the bladder flap.  The uterus was returned to the abdomen. A 2-0 Vicryl was used to re-approximate the peritoneum. The muscle was re-approximated with 2-0 Chromic. The fascia was then closed with 0 Vicryl in a running fashion. The subcutaneous tissue was re-approximated with 3-0 Vicryl. The skin was closed with staples.  An Aquacel dressing was placed over the incision. The patient tolerated the procedure well. Sponge, needle, and instrument counts were correct x2.          Delivery Information for Petty Willis    Birth information:  YOB: 2025   Time of birth: 4:36 PM   Sex: female   Head Delivery Date/Time: 2023  4:36 PM   Delivery type: , Low Transverse   Gestational Age: 38w0d        Delivery Providers    Delivering clinician: Anna Domingo MD           Measurements    Weight: (No Documentation)  Length: (No Documentation)         Apgars    Living status: Living  Apgar Component Scores:  1 min.:  5 min.:  10 min.:  15 min.:  20 min.:    Skin color:  0  1       Heart rate:  2  2       Reflex irritability:  2  2       Muscle tone:  2  2       Respiratory effort:  2  2       Total:  8  9       Apgars assigned by: MARTIN ORTIZ LPN         Operative Delivery     Forceps attempted?: No  Vacuum extractor attempted?: No         Shoulder Dystocia    Shoulder dystocia present?: No           Presentation    Presentation: Vertex           Interventions/Resuscitation    Method: Bulb Suctioning, Tactile Stimulation       Cord    Vessels: 3 vessels  Complications: None  Delayed Cord Clamping?: No  Cord Clamped Date/Time: 2023  4:36 PM  Cord Blood Disposition: Lab  Gases Sent?: No  Stem Cell Collection (by MD): No       Placenta    Placenta delivery date/time: 2023 1638  Placenta removal: Manual removal  Placenta appearance: Intact  Placenta disposition: Discarded           Labor Events:       labor: No     Labor Onset Date/Time:         Dilation Complete Date/Time:         Start Pushing Date/Time:         Start Pushing Date/Time:       Rupture Date/Time:            Rupture type:          Fluid Amount:       Fluid Color:                steroids: None     Antibiotics given for GBS: No     Induction:       Indications for induction:        Augmentation:       Indications for augmentation:       Labor complications: None     Additional complications: Meconium Passage During Delivery        Cervical ripening:                     Delivery:      Episiotomy:       Indication for Episiotomy:       Perineal Lacerations:   Repaired:      Periurethral Laceration:   Repaired:     Labial Laceration:   Repaired:     Sulcus Laceration:   Repaired:     Vaginal Laceration:   Repaired:     Cervical Laceration:   Repaired:     Repair suture:       Repair # of packets:       Last Value - EBL - Nursing (mL):       Sum - EBL - Nursing (mL): 0     Last Value - EBL - Anesthesia (mL):      Calculated QBL (mL): 140      Vaginal Sweep Performed:       Surgicount Correct:       Vaginal Packing:   Quantity:       Other providers:       Anesthesia    Method: Spinal          Details (if applicable):  Trial of Labor No    Categorization: Repeat    Priority:  Urgent   Indications for : Repeat Section   Incision Type: low transverse     Additional  information:  Forceps:    Vacuum:    Breech:    Observed anomalies    Other (Comments):

## 2023-07-31 NOTE — ASSESSMENT & PLAN NOTE
To OR for repeat   Patient cleared for Anesthesia: Spinal    Anesthesia/Surgery risks, benefits, and alternative options discussed and understood by patient/fa

## 2023-07-31 NOTE — SUBJECTIVE & OBJECTIVE
Obstetric HPI:  Patient reports contractions, active fetal movement, No vaginal bleeding , No loss of fluid     This pregnancy has been complicated by HIV, h/o , HSV.    OB History    Para Term  AB Living   2 1 1 0 0 0   SAB IAB Ectopic Multiple Live Births   0 0 0 0 0      # Outcome Date GA Lbr Steve/2nd Weight Sex Delivery Anes PTL Lv   2 Current            1 Term 17 37w1d  2.91 kg (6 lb 6.7 oz) M CS-LTranv Spinal        Name: RO LEWIS      Apgar1: 7  Apgar5: 9     Past Medical History:   Diagnosis Date    Herpes simplex virus (HSV) infection     HIV infection     HIV positive     Hx of tonsillectomy     Seizures      Past Surgical History:   Procedure Laterality Date     SECTION  2017    TONSILLECTOMY      tubes in ears         PTA Medications   Medication Sig    emtricitabine-tenofovir 200-300 mg (TRUVADA) 200-300 mg Tab Take 1 tablet by mouth once daily.    ondansetron (ZOFRAN) 4 MG tablet Take 1 tablet (4 mg total) by mouth daily as needed for Nausea.    PNV62-FA-om3-dha-epa-fish oil (PRENATAL GUMMY) 400 mcg-35 mg -25 mg-5 mg Chew Take by mouth.    raltegravir (ISENTRESS HD) 600 mg tablet Take 2 tablets (1,200 mg total) by mouth once daily.    valACYclovir (VALTREX) 500 MG tablet Take 1 tablet (500 mg total) by mouth 2 (two) times daily.    albuterol (PROVENTIL/VENTOLIN HFA) 90 mcg/actuation inhaler Inhale 2 puffs into the lungs every 6 (six) hours as needed.       Review of patient's allergies indicates:   Allergen Reactions    Aspirin Hives        Family History       Problem Relation (Age of Onset)    Hypertension Mother    No Known Problems Father, Sister, Brother, Son, Maternal Aunt, Maternal Uncle, Paternal Aunt, Paternal Uncle, Maternal Grandmother, Maternal Grandfather, Paternal Grandmother, Paternal Grandfather          Tobacco Use    Smoking status: Never    Smokeless tobacco: Never   Substance and Sexual Activity    Alcohol use: Not Currently      Comment: oss    Drug use: Not Currently     Types: Marijuana     Comment: last use 8/20/2016    Sexual activity: Yes     Partners: Male     Birth control/protection: None     Comment: single     Review of Systems   Constitutional:  Negative for activity change, appetite change, chills, diaphoresis, fatigue and fever.   Eyes:  Negative for visual disturbance.   Respiratory:  Negative for cough, shortness of breath and wheezing.    Cardiovascular:  Negative for chest pain and palpitations.   Gastrointestinal:  Positive for abdominal pain. Negative for constipation, diarrhea, nausea and vomiting.   Genitourinary:  Negative for dysuria, genital sores, pelvic pain, urgency, vaginal bleeding, vaginal discharge, vaginal pain and vaginal odor.   Musculoskeletal:  Negative for back pain, joint swelling and myalgias.   Integumentary:  Negative for rash.   Neurological:  Negative for seizures, syncope, numbness and headaches.   Hematological:  Negative for adenopathy. Does not bruise/bleed easily.   Psychiatric/Behavioral:  Negative for depression. The patient is not nervous/anxious.       Objective:     Vital Signs (Most Recent):  Temp: 97.7 °F (36.5 °C) (07/31/23 1230)  Pulse: 108 (07/31/23 1230)  Resp: 17 (07/31/23 1225)  BP: (Abnormal) 142/86 (07/31/23 1225)  SpO2: 98 % (07/31/23 1230) Vital Signs (24h Range):  Temp:  [97.7 °F (36.5 °C)] 97.7 °F (36.5 °C)  Pulse:  [102-108] 108  Resp:  [17] 17  SpO2:  [98 %] 98 %  BP: (142)/(86) 142/86     Weight: 85.4 kg (188 lb 3.2 oz)  Body mass index is 31.32 kg/m².    FHT: 150 Cat 1 (reassuring)  TOCO:  Q 2-5 minutes     Physical Exam:   Constitutional: She is oriented to person, place, and time. She appears well-developed and well-nourished. No distress.    HENT:   Head: Normocephalic and atraumatic.    Eyes: Conjunctivae and EOM are normal.      Pulmonary/Chest: Effort normal. No respiratory distress.                  Musculoskeletal: Normal range of motion.       Neurological: She  is alert and oriented to person, place, and time.    Skin: Skin is warm and dry.    Psychiatric: She has a normal mood and affect. Her behavior is normal. Judgment and thought content normal.        Cervix:  Dilation:  0.5  Effacement:  50%  Station: -3  Presentation: Vertex     Significant Labs:  Lab Results   Component Value Date    GROUPTRH O POS 01/03/2023    HEPBSAG Non-reactive 01/03/2023    STREPBCULT No Group B Streptococcus isolated 07/19/2023       I have personallly reviewed all pertinent lab results from the last 24 hours.

## 2023-07-31 NOTE — H&P
[facility] - Labor & Delivery  Obstetrics  History & Physical    Patient Name: Nidhi Willis  MRN: [id number]  Admission Date: 2023  Primary Care Provider: Primary Doctor No    Subjective:     Principal Problem:Normal labor    History of Present Illness:  Pt is a  @ 38 0/7 WGA who presents in labor with contractions every 2-5 minutes.  Pt has a h/o a  and desires repeat. Pregnancy complicated by HIV infection, h/o genital HSV.        Obstetric HPI:  Patient reports contractions, active fetal movement, No vaginal bleeding , No loss of fluid     This pregnancy has been complicated by HIV, h/o , HSV.    OB History    Para Term  AB Living   2 1 1 0 0 0   SAB IAB Ectopic Multiple Live Births   0 0 0 0 0      # Outcome Date GA Lbr Steve/2nd Weight Sex Delivery Anes PTL Lv   2 Current            1 Term 17 37w1d  2.91 kg (6 lb 6.7 oz) M CS-LTranv Spinal        Name: RO WILLIS      Apgar1: 7  Apgar5: 9     Past Medical History:   Diagnosis Date    Herpes simplex virus (HSV) infection     HIV infection     HIV positive     Hx of tonsillectomy     Seizures      Past Surgical History:   Procedure Laterality Date     SECTION  2017    TONSILLECTOMY      tubes in ears         PTA Medications   Medication Sig    emtricitabine-tenofovir 200-300 mg (TRUVADA) 200-300 mg Tab Take 1 tablet by mouth once daily.    ondansetron (ZOFRAN) 4 MG tablet Take 1 tablet (4 mg total) by mouth daily as needed for Nausea.    PNV62-FA-om3-dha-epa-fish oil (PRENATAL GUMMY) 400 mcg-35 mg -25 mg-5 mg Chew Take by mouth.    raltegravir (ISENTRESS HD) 600 mg tablet Take 2 tablets (1,200 mg total) by mouth once daily.    valACYclovir (VALTREX) 500 MG tablet Take 1 tablet (500 mg total) by mouth 2 (two) times daily.    albuterol (PROVENTIL/VENTOLIN HFA) 90 mcg/actuation inhaler Inhale 2 puffs into the lungs every 6 (six) hours as needed.       Review of patient's  allergies indicates:   Allergen Reactions    Aspirin Hives        Family History       Problem Relation (Age of Onset)    Hypertension Mother    No Known Problems Father, Sister, Brother, Son, Maternal Aunt, Maternal Uncle, Paternal Aunt, Paternal Uncle, Maternal Grandmother, Maternal Grandfather, Paternal Grandmother, Paternal Grandfather          Tobacco Use    Smoking status: Never    Smokeless tobacco: Never   Substance and Sexual Activity    Alcohol use: Not Currently     Comment: oss    Drug use: Not Currently     Types: Marijuana     Comment: last use 8/20/2016    Sexual activity: Yes     Partners: Male     Birth control/protection: None     Comment: single     Review of Systems   Constitutional:  Negative for activity change, appetite change, chills, diaphoresis, fatigue and fever.   Eyes:  Negative for visual disturbance.   Respiratory:  Negative for cough, shortness of breath and wheezing.    Cardiovascular:  Negative for chest pain and palpitations.   Gastrointestinal:  Positive for abdominal pain. Negative for constipation, diarrhea, nausea and vomiting.   Genitourinary:  Negative for dysuria, genital sores, pelvic pain, urgency, vaginal bleeding, vaginal discharge, vaginal pain and vaginal odor.   Musculoskeletal:  Negative for back pain, joint swelling and myalgias.   Integumentary:  Negative for rash.   Neurological:  Negative for seizures, syncope, numbness and headaches.   Hematological:  Negative for adenopathy. Does not bruise/bleed easily.   Psychiatric/Behavioral:  Negative for depression. The patient is not nervous/anxious.       Objective:     Vital Signs (Most Recent):  Temp: 97.7 °F (36.5 °C) (07/31/23 1230)  Pulse: 108 (07/31/23 1230)  Resp: 17 (07/31/23 1225)  BP: (Abnormal) 142/86 (07/31/23 1225)  SpO2: 98 % (07/31/23 1230) Vital Signs (24h Range):  Temp:  [97.7 °F (36.5 °C)] 97.7 °F (36.5 °C)  Pulse:  [102-108] 108  Resp:  [17] 17  SpO2:  [98 %] 98 %  BP: (142)/(86) 142/86      Weight: 85.4 kg (188 lb 3.2 oz)  Body mass index is 31.32 kg/m².    FHT: 150 Cat 1 (reassuring)  TOCO:  Q 2-5 minutes     Physical Exam:   Constitutional: She is oriented to person, place, and time. She appears well-developed and well-nourished. No distress.    HENT:   Head: Normocephalic and atraumatic.    Eyes: Conjunctivae and EOM are normal.      Pulmonary/Chest: Effort normal. No respiratory distress.                  Musculoskeletal: Normal range of motion.       Neurological: She is alert and oriented to person, place, and time.    Skin: Skin is warm and dry.    Psychiatric: She has a normal mood and affect. Her behavior is normal. Judgment and thought content normal.        Cervix:  Dilation:  0.5  Effacement:  50%  Station: -3  Presentation: Vertex     Significant Labs:  Lab Results   Component Value Date    GROUPTRH O POS 2023    HEPBSAG Non-reactive 2023    STREPBCULT No Group B Streptococcus isolated 2023       I have personallly reviewed all pertinent lab results from the last 24 hours.    Assessment/Plan:     23 y.o. female  at 38w0d for:    * Normal labor  Plan for repeat  after AZT    History of  delivery affecting pregnancy  To OR for repeat   Patient cleared for Anesthesia: Spinal    Anesthesia/Surgery risks, benefits, and alternative options discussed and understood by patient/fa      HIV disease affecting pregnancy in third trimester  AZT load x 1 hour, followed by continuous until delivery.   Will plan for 3 hours meds prior to delivery as long as mom and baby stable.    Peds aware.         Anna Domingo MD  Obstetrics  [facility] - Labor & Delivery

## 2023-07-31 NOTE — ANESTHESIA POSTPROCEDURE EVALUATION
Anesthesia Post Evaluation    Patient: Nidhi Willis    Procedure(s) Performed: Procedure(s) (LRB):  REPEAT  SECTION (N/A)    Final Anesthesia Type: epidural      Patient location during evaluation: labor & delivery  Patient participation: Yes- Able to Participate  Level of consciousness: awake and alert, oriented and awake  Post-procedure vital signs: reviewed and stable  Pain management: adequate  Airway patency: patent    PONV status at discharge: No PONV  Anesthetic complications: no      Cardiovascular status: blood pressure returned to baseline  Respiratory status: unassisted, spontaneous ventilation and room air  Hydration status: euvolemic  Follow-up not needed.  Comments: [facility]          Vitals Value Taken Time   /86 23 1440   Temp 36.5 °C (97.7 °F) 23 1232   Pulse 103 23 1709   Resp 17 23 1225   SpO2 96 % 23 1709   Vitals shown include unvalidated device data.      No case tracking events are documented in the log.      Pain/Mitzi Score: Pain Rating Prior to Med Admin: 0 (2023  3:22 PM)

## 2023-07-31 NOTE — ASSESSMENT & PLAN NOTE
AZT load x 1 hour, followed by continuous until delivery.   Will plan for 3 hours meds prior to delivery as long as mom and baby stable.    Peds aware.

## 2023-07-31 NOTE — ANESTHESIA PROCEDURE NOTES
Spinal    Diagnosis:  Section  Patient location during procedure: OR  Start time: 2023 6:11 PM  Timeout: 2023 6:10 PM  End time: 2023 6:18 PM    Staffing  Authorizing Provider: Fahad Rider CRNA  Performing Provider: Fahad Rider CRNA    Staffing  Performed by: Fahad Rider CRNA  Authorized by: Fahad Rider CRNA    Preanesthetic Checklist  Completed: patient identified, IV checked, site marked, risks and benefits discussed, surgical consent, monitors and equipment checked, pre-op evaluation and timeout performed  Spinal Block  Patient position: sitting  Prep: ChloraPrep  Patient monitoring: heart rate and continuous pulse ox  Approach: midline  Location: L3-4  Injection technique: single shot  CSF Fluid: clear free-flowing CSF  Needle  Needle type: pencil-tip   Needle gauge: 25 G  Needle length: 3.5 in  Additional Documentation: incremental injection, negative aspiration for heme and no paresthesia on injection  Needle localization: anatomical landmarks  Assessment  Sensory level: T4   Dermatomal levels determined by alcohol wipe and pinch or prick  Ease of block: easy  Patient's tolerance of the procedure: comfortable throughout block and no complaints

## 2023-07-31 NOTE — HOSPITAL COURSE
Pt presents to L&D with regular contractions.  Will admit, start AZT and plan for repeat  following 3 hours of AZT.  POD#1 routine post op and pp care  POD#2 routine post op.  Pt with critically low H/H this am.  Pt remains asymptomatic and is ambulating in the room this am without complaint.  Agrees to transfusion.

## 2023-08-01 PROBLEM — Z98.891 S/P REPEAT LOW TRANSVERSE C-SECTION: Status: ACTIVE | Noted: 2023-07-31

## 2023-08-01 PROBLEM — A60.09 HERPES GENITALIS IN WOMEN: Status: RESOLVED | Noted: 2017-05-02 | Resolved: 2023-08-01

## 2023-08-01 PROBLEM — Z37.9 NORMAL LABOR: Status: RESOLVED | Noted: 2017-03-22 | Resolved: 2023-08-01

## 2023-08-01 PROBLEM — R63.4 WEIGHT LOSS: Status: RESOLVED | Noted: 2019-10-31 | Resolved: 2023-08-01

## 2023-08-01 PROBLEM — Z34.93 NORMAL IUP (INTRAUTERINE PREGNANCY) ON PRENATAL ULTRASOUND, THIRD TRIMESTER: Status: RESOLVED | Noted: 2023-06-27 | Resolved: 2023-08-01

## 2023-08-01 PROBLEM — B37.31 YEAST VAGINITIS: Status: RESOLVED | Noted: 2018-05-03 | Resolved: 2023-08-01

## 2023-08-01 PROBLEM — D62 POSTOPERATIVE ANEMIA DUE TO ACUTE BLOOD LOSS: Status: ACTIVE | Noted: 2023-08-01

## 2023-08-01 LAB
BASOPHILS # BLD AUTO: 0.03 K/UL (ref 0–0.2)
BASOPHILS NFR BLD: 0.2 % (ref 0–1.9)
DIFFERENTIAL METHOD: ABNORMAL
EOSINOPHIL # BLD AUTO: 0 K/UL (ref 0–0.5)
EOSINOPHIL NFR BLD: 0.1 % (ref 0–8)
ERYTHROCYTE [DISTWIDTH] IN BLOOD BY AUTOMATED COUNT: 14.5 % (ref 11.5–14.5)
HCT VFR BLD AUTO: 22.6 % (ref 37–48.5)
HGB BLD-MCNC: 7.2 G/DL (ref 12–16)
IMM GRANULOCYTES # BLD AUTO: 0.14 K/UL (ref 0–0.04)
IMM GRANULOCYTES NFR BLD AUTO: 0.9 % (ref 0–0.5)
LYMPHOCYTES # BLD AUTO: 2.3 K/UL (ref 1–4.8)
LYMPHOCYTES NFR BLD: 15 % (ref 18–48)
MCH RBC QN AUTO: 24.3 PG (ref 27–31)
MCHC RBC AUTO-ENTMCNC: 31.9 G/DL (ref 32–36)
MCV RBC AUTO: 76 FL (ref 82–98)
MONOCYTES # BLD AUTO: 1.9 K/UL (ref 0.3–1)
MONOCYTES NFR BLD: 12.2 % (ref 4–15)
NEUTROPHILS # BLD AUTO: 11 K/UL (ref 1.8–7.7)
NEUTROPHILS NFR BLD: 71.6 % (ref 38–73)
NRBC BLD-RTO: 0 /100 WBC
PLATELET # BLD AUTO: 168 K/UL (ref 150–450)
PMV BLD AUTO: 10.7 FL (ref 9.2–12.9)
RBC # BLD AUTO: 2.96 M/UL (ref 4–5.4)
WBC # BLD AUTO: 15.34 K/UL (ref 3.9–12.7)

## 2023-08-01 PROCEDURE — 36415 COLL VENOUS BLD VENIPUNCTURE: CPT | Performed by: OBSTETRICS & GYNECOLOGY

## 2023-08-01 PROCEDURE — 99231 PR SUBSEQUENT HOSPITAL CARE,LEVL I: ICD-10-PCS | Mod: ,,, | Performed by: OBSTETRICS & GYNECOLOGY

## 2023-08-01 PROCEDURE — 11000001 HC ACUTE MED/SURG PRIVATE ROOM

## 2023-08-01 PROCEDURE — 99231 SBSQ HOSP IP/OBS SF/LOW 25: CPT | Mod: ,,, | Performed by: OBSTETRICS & GYNECOLOGY

## 2023-08-01 PROCEDURE — 63600175 PHARM REV CODE 636 W HCPCS: Performed by: OBSTETRICS & GYNECOLOGY

## 2023-08-01 PROCEDURE — 25000003 PHARM REV CODE 250: Performed by: OBSTETRICS & GYNECOLOGY

## 2023-08-01 PROCEDURE — 85025 COMPLETE CBC W/AUTO DIFF WBC: CPT | Performed by: OBSTETRICS & GYNECOLOGY

## 2023-08-01 RX ORDER — EMTRICITABINE 200 MG/1
200 CAPSULE ORAL DAILY
Status: DISCONTINUED | OUTPATIENT
Start: 2023-08-01 | End: 2023-08-02 | Stop reason: HOSPADM

## 2023-08-01 RX ORDER — TENOFOVIR DISOPROXIL FUMARATE 300 MG/1
300 TABLET, FILM COATED ORAL DAILY
Status: DISCONTINUED | OUTPATIENT
Start: 2023-08-01 | End: 2023-08-02 | Stop reason: HOSPADM

## 2023-08-01 RX ADMIN — ENOXAPARIN SODIUM 40 MG: 40 INJECTION SUBCUTANEOUS at 04:08

## 2023-08-01 RX ADMIN — EMTRICITABINE 200 MG: 200 CAPSULE ORAL at 08:08

## 2023-08-01 RX ADMIN — OXYCODONE HYDROCHLORIDE 10 MG: 5 TABLET ORAL at 09:08

## 2023-08-01 RX ADMIN — PRENATAL VIT W/ FE FUMARATE-FA TAB 27-0.8 MG 1 TABLET: 27-0.8 TAB at 08:08

## 2023-08-01 RX ADMIN — ACETAMINOPHEN 650 MG: 325 TABLET ORAL at 04:08

## 2023-08-01 RX ADMIN — TENOFOVIR DISOPROXIL FUMARATE 300 MG: 300 TABLET, FILM COATED ORAL at 08:08

## 2023-08-01 RX ADMIN — DOCUSATE SODIUM 200 MG: 100 CAPSULE, LIQUID FILLED ORAL at 09:08

## 2023-08-01 RX ADMIN — ACETAMINOPHEN 650 MG: 325 TABLET ORAL at 10:08

## 2023-08-01 RX ADMIN — DOCUSATE SODIUM 200 MG: 100 CAPSULE, LIQUID FILLED ORAL at 08:08

## 2023-08-01 RX ADMIN — OXYCODONE HYDROCHLORIDE 10 MG: 5 TABLET ORAL at 08:08

## 2023-08-01 RX ADMIN — KETOROLAC TROMETHAMINE 30 MG: 30 INJECTION, SOLUTION INTRAMUSCULAR; INTRAVENOUS at 11:08

## 2023-08-01 RX ADMIN — KETOROLAC TROMETHAMINE 30 MG: 30 INJECTION, SOLUTION INTRAMUSCULAR; INTRAVENOUS at 05:08

## 2023-08-01 RX ADMIN — RALTEGRAVIR 1200 MG: 400 TABLET, FILM COATED ORAL at 08:08

## 2023-08-01 RX ADMIN — ACETAMINOPHEN 650 MG: 325 TABLET ORAL at 11:08

## 2023-08-01 RX ADMIN — IBUPROFEN 800 MG: 800 TABLET, FILM COATED ORAL at 04:08

## 2023-08-01 RX ADMIN — OXYCODONE HYDROCHLORIDE 10 MG: 5 TABLET ORAL at 01:08

## 2023-08-01 RX ADMIN — OXYCODONE HYDROCHLORIDE 10 MG: 5 TABLET ORAL at 02:08

## 2023-08-01 RX ADMIN — IRON SUCROSE 200 MG: 20 INJECTION, SOLUTION INTRAVENOUS at 08:08

## 2023-08-01 NOTE — SUBJECTIVE & OBJECTIVE
Interval History: POD#1    She is doing well this morning. She is tolerating a regular diet without nausea or vomiting. She is voiding spontaneously. She is ambulating. Vaginal bleeding is mild. She denies fever or chills. Abdominal pain is mild and controlled with oral medications. She Is not breastfeeding. She desires circumcision for her male baby: not applicable.    Objective:     Vital Signs (Most Recent):  Temp: 96.8 °F (36 °C) (08/01/23 0432)  Pulse: 107 (08/01/23 0432)  Resp: 16 (08/01/23 0432)  BP: (Abnormal) 136/91 (08/01/23 0432)  SpO2: 100 % (08/01/23 0432) Vital Signs (24h Range):  Temp:  [96.3 °F (35.7 °C)-98.6 °F (37 °C)] 96.8 °F (36 °C)  Pulse:  [] 107  Resp:  [16-18] 16  SpO2:  [96 %-100 %] 100 %  BP: (128-147)/(69-97) 136/91     Weight: 85.4 kg (188 lb 3.2 oz)  Body mass index is 31.32 kg/m².      Intake/Output Summary (Last 24 hours) at 8/1/2023 0726  Last data filed at 7/31/2023 1810  Gross per 24 hour   Intake 1201.21 ml   Output 440 ml   Net 761.21 ml         Significant Labs:  Lab Results   Component Value Date    GROUPTRH O POS 07/31/2023    HEPBSAG Non-reactive 01/03/2023    STREPBCULT No Group B Streptococcus isolated 07/19/2023     Recent Labs   Lab 08/01/23  0502   HGB 7.2*   HCT 22.6*       I have personallly reviewed all pertinent lab results from the last 24 hours.    Physical Exam:   Constitutional: She is oriented to person, place, and time. She appears well-developed and well-nourished. No distress.    HENT:   Head: Normocephalic and atraumatic.    Eyes: Conjunctivae and EOM are normal.      Pulmonary/Chest: Effort normal.        Abdominal: Soft. She exhibits distension and abdominal incision (bandage intact with drainage noted). There is abdominal tenderness (minimal at fundus). There is no rebound and no guarding.     Genitourinary:    Genitourinary Comments: Fundus firm below umbilicus  Lochia minimal             Musculoskeletal: Normal range of motion. No tenderness.        Neurological: She is alert and oriented to person, place, and time.    Skin: Skin is warm and dry.        Review of Systems

## 2023-08-01 NOTE — PROGRESS NOTES
[facility] - Labor & Delivery  Obstetrics  Postpartum Progress Note    Patient Name: Nidhi Willis  MRN: [id number]  Admission Date: 2023  Hospital Length of Stay: 1 days  Attending Physician: Anna Domingo MD  Primary Care Provider: Primary Doctor No    Subjective:     Principal Problem:S/P repeat low transverse     Hospital Course:  Pt presents to L&D with regular contractions.  Will admit, start AZT and plan for repeat  following 3 hours of AZT.  POD#1 routine post op and pp care      Interval History: POD#1    She is doing well this morning. She is tolerating a regular diet without nausea or vomiting. She is voiding spontaneously. She is ambulating. Vaginal bleeding is mild. She denies fever or chills. Abdominal pain is mild and controlled with oral medications. She Is not breastfeeding. She desires circumcision for her male baby: not applicable.    Objective:     Vital Signs (Most Recent):  Temp: 96.8 °F (36 °C) (23)  Pulse: 107 (23)  Resp: 16 (23)  BP: (Abnormal) 136/91 (23)  SpO2: 100 % (23) Vital Signs (24h Range):  Temp:  [96.3 °F (35.7 °C)-98.6 °F (37 °C)] 96.8 °F (36 °C)  Pulse:  [] 107  Resp:  [16-18] 16  SpO2:  [96 %-100 %] 100 %  BP: (128-147)/(69-97) 136/91     Weight: 85.4 kg (188 lb 3.2 oz)  Body mass index is 31.32 kg/m².      Intake/Output Summary (Last 24 hours) at 2023 0726  Last data filed at 2023 1810  Gross per 24 hour   Intake 1201.21 ml   Output 440 ml   Net 761.21 ml         Significant Labs:  Lab Results   Component Value Date    GROUPTRH O POS 2023    HEPBSAG Non-reactive 2023    STREPBCULT No Group B Streptococcus isolated 2023     Recent Labs   Lab 23  0502   HGB 7.2*   HCT 22.6*       I have personallly reviewed all pertinent lab results from the last 24 hours.    Physical Exam:   Constitutional: She is oriented to person, place, and time. She appears  well-developed and well-nourished. No distress.    HENT:   Head: Normocephalic and atraumatic.    Eyes: Conjunctivae and EOM are normal.      Pulmonary/Chest: Effort normal.        Abdominal: Soft. She exhibits distension and abdominal incision (bandage intact with drainage noted). There is abdominal tenderness (minimal at fundus). There is no rebound and no guarding.     Genitourinary:    Genitourinary Comments: Fundus firm below umbilicus  Lochia minimal             Musculoskeletal: Normal range of motion. No tenderness.       Neurological: She is alert and oriented to person, place, and time.    Skin: Skin is warm and dry.        Review of Systems    Assessment/Plan:     23 y.o. female  for:    * S/P repeat low transverse   To OR for repeat   Patient cleared for Anesthesia: Spinal    Anesthesia/Surgery risks, benefits, and alternative options discussed and understood by patient/fa      Postoperative anemia due to acute blood loss  Pt asymptomatic.  QBL reviewed; underestimated blood loss.  Will give IV iron and monitor today.   Repeat CBC in am tomorrow or sooner if symptomatic.    HIV disease affecting pregnancy in third trimester  AZT load x 1 hour, followed by continuous until delivery.   Will plan for 3 hours meds prior to delivery as long as mom and baby stable.    Peds aware.         Disposition: As patient meets milestones, will plan to discharge in 1-2 days.    Anna Domingo MD  Obstetrics  [facility] - Labor & Delivery

## 2023-08-01 NOTE — ASSESSMENT & PLAN NOTE
Pt asymptomatic.  QBL reviewed; underestimated blood loss.  Will give IV iron and monitor today.   Repeat CBC in am tomorrow or sooner if symptomatic.

## 2023-08-01 NOTE — PLAN OF CARE
Patient stable all shift. Assessment done as per flowsheet. Pain controlled with around-the-clock and PRN pain medications as prescribed, see MAR. Mother continues to formula feed without difficulty. Mother and father remain at bedside, bonding appropriately with infant, responsive to all cues. Patient in no apparent distress at this time, will continue to monitor.

## 2023-08-01 NOTE — PLAN OF CARE
[facility] - Labor & Delivery  Discharge Assessment    Primary Care Provider: Primary Doctor No     OB Screen (most recent)       OB Screen - 23 0811          OB SCREEN    Assessment Type Discharge Planning Brief Assessment     Source of Information health record     Any indications/suspicions for None     Is this a teen pregnancy No     Is the baby in NICU No     Indication for adoption/[facility] No     HIV (+) Yes     Any congenital  disorders No     Fetal demise/ death No                     Chart review completed. Nothing noted for case management in review.     Case management to remain available for any future needs.

## 2023-08-02 VITALS
HEIGHT: 65 IN | DIASTOLIC BLOOD PRESSURE: 91 MMHG | WEIGHT: 188.19 LBS | HEART RATE: 106 BPM | RESPIRATION RATE: 18 BRPM | SYSTOLIC BLOOD PRESSURE: 151 MMHG | TEMPERATURE: 98 F | BODY MASS INDEX: 31.35 KG/M2 | OXYGEN SATURATION: 100 %

## 2023-08-02 LAB
BASOPHILS # BLD AUTO: 0.02 K/UL (ref 0–0.2)
BASOPHILS # BLD AUTO: 0.03 K/UL (ref 0–0.2)
BASOPHILS NFR BLD: 0.2 % (ref 0–1.9)
BASOPHILS NFR BLD: 0.3 % (ref 0–1.9)
BLD PROD TYP BPU: NORMAL
BLOOD UNIT EXPIRATION DATE: NORMAL
BLOOD UNIT TYPE CODE: 5100
BLOOD UNIT TYPE: NORMAL
CODING SYSTEM: NORMAL
CROSSMATCH INTERPRETATION: NORMAL
DIFFERENTIAL METHOD: ABNORMAL
DIFFERENTIAL METHOD: ABNORMAL
DISPENSE STATUS: NORMAL
EOSINOPHIL # BLD AUTO: 0.2 K/UL (ref 0–0.5)
EOSINOPHIL # BLD AUTO: 0.2 K/UL (ref 0–0.5)
EOSINOPHIL NFR BLD: 1.5 % (ref 0–8)
EOSINOPHIL NFR BLD: 1.6 % (ref 0–8)
ERYTHROCYTE [DISTWIDTH] IN BLOOD BY AUTOMATED COUNT: 14.7 % (ref 11.5–14.5)
ERYTHROCYTE [DISTWIDTH] IN BLOOD BY AUTOMATED COUNT: 15.6 % (ref 11.5–14.5)
HCT VFR BLD AUTO: 18.6 % (ref 37–48.5)
HCT VFR BLD AUTO: 27.2 % (ref 37–48.5)
HGB BLD-MCNC: 5.9 G/DL (ref 12–16)
HGB BLD-MCNC: 8.9 G/DL (ref 12–16)
IMM GRANULOCYTES # BLD AUTO: 0.11 K/UL (ref 0–0.04)
IMM GRANULOCYTES # BLD AUTO: 0.11 K/UL (ref 0–0.04)
IMM GRANULOCYTES NFR BLD AUTO: 0.9 % (ref 0–0.5)
IMM GRANULOCYTES NFR BLD AUTO: 1 % (ref 0–0.5)
LYMPHOCYTES # BLD AUTO: 1.6 K/UL (ref 1–4.8)
LYMPHOCYTES # BLD AUTO: 2.2 K/UL (ref 1–4.8)
LYMPHOCYTES NFR BLD: 13 % (ref 18–48)
LYMPHOCYTES NFR BLD: 20.3 % (ref 18–48)
MCH RBC QN AUTO: 24.7 PG (ref 27–31)
MCH RBC QN AUTO: 25.5 PG (ref 27–31)
MCHC RBC AUTO-ENTMCNC: 31.7 G/DL (ref 32–36)
MCHC RBC AUTO-ENTMCNC: 32.7 G/DL (ref 32–36)
MCV RBC AUTO: 78 FL (ref 82–98)
MCV RBC AUTO: 78 FL (ref 82–98)
MONOCYTES # BLD AUTO: 1 K/UL (ref 0.3–1)
MONOCYTES # BLD AUTO: 1.3 K/UL (ref 0.3–1)
MONOCYTES NFR BLD: 11 % (ref 4–15)
MONOCYTES NFR BLD: 9.8 % (ref 4–15)
NEUTROPHILS # BLD AUTO: 7.1 K/UL (ref 1.8–7.7)
NEUTROPHILS # BLD AUTO: 9 K/UL (ref 1.8–7.7)
NEUTROPHILS NFR BLD: 67 % (ref 38–73)
NEUTROPHILS NFR BLD: 73.4 % (ref 38–73)
NRBC BLD-RTO: 0 /100 WBC
NRBC BLD-RTO: 0 /100 WBC
NUM UNITS TRANS PACKED RBC: NORMAL
PLATELET # BLD AUTO: 129 K/UL (ref 150–450)
PLATELET # BLD AUTO: 136 K/UL (ref 150–450)
PMV BLD AUTO: 10.5 FL (ref 9.2–12.9)
PMV BLD AUTO: 10.6 FL (ref 9.2–12.9)
RBC # BLD AUTO: 2.39 M/UL (ref 4–5.4)
RBC # BLD AUTO: 3.49 M/UL (ref 4–5.4)
WBC # BLD AUTO: 10.59 K/UL (ref 3.9–12.7)
WBC # BLD AUTO: 12.18 K/UL (ref 3.9–12.7)

## 2023-08-02 PROCEDURE — 99238 PR HOSPITAL DISCHARGE DAY,<30 MIN: ICD-10-PCS | Mod: ICN,,, | Performed by: OBSTETRICS & GYNECOLOGY

## 2023-08-02 PROCEDURE — 36430 TRANSFUSION BLD/BLD COMPNT: CPT

## 2023-08-02 PROCEDURE — 36415 COLL VENOUS BLD VENIPUNCTURE: CPT | Performed by: OBSTETRICS & GYNECOLOGY

## 2023-08-02 PROCEDURE — 25000003 PHARM REV CODE 250: Performed by: OBSTETRICS & GYNECOLOGY

## 2023-08-02 PROCEDURE — P9016 RBC LEUKOCYTES REDUCED: HCPCS | Performed by: OBSTETRICS & GYNECOLOGY

## 2023-08-02 PROCEDURE — 85025 COMPLETE CBC W/AUTO DIFF WBC: CPT | Performed by: OBSTETRICS & GYNECOLOGY

## 2023-08-02 PROCEDURE — 63600175 PHARM REV CODE 636 W HCPCS: Performed by: OBSTETRICS & GYNECOLOGY

## 2023-08-02 PROCEDURE — 99238 HOSP IP/OBS DSCHRG MGMT 30/<: CPT | Mod: ICN,,, | Performed by: OBSTETRICS & GYNECOLOGY

## 2023-08-02 RX ORDER — HYDROCODONE BITARTRATE AND ACETAMINOPHEN 500; 5 MG/1; MG/1
TABLET ORAL
Status: DISCONTINUED | OUTPATIENT
Start: 2023-08-02 | End: 2023-08-02 | Stop reason: HOSPADM

## 2023-08-02 RX ORDER — OXYCODONE HYDROCHLORIDE 5 MG/1
5 TABLET ORAL EVERY 4 HOURS PRN
Qty: 30 TABLET | Refills: 0 | Status: SHIPPED | OUTPATIENT
Start: 2023-08-02 | End: 2023-08-16 | Stop reason: SDUPTHER

## 2023-08-02 RX ORDER — ONDANSETRON 4 MG/1
4 TABLET, FILM COATED ORAL DAILY PRN
Qty: 30 TABLET | Refills: 1 | Status: SHIPPED | OUTPATIENT
Start: 2023-08-02 | End: 2024-08-01

## 2023-08-02 RX ORDER — IBUPROFEN 800 MG/1
800 TABLET ORAL EVERY 8 HOURS
Qty: 30 TABLET | Refills: 0 | Status: SHIPPED | OUTPATIENT
Start: 2023-08-02 | End: 2023-08-16 | Stop reason: SDUPTHER

## 2023-08-02 RX ADMIN — OXYCODONE HYDROCHLORIDE 10 MG: 5 TABLET ORAL at 12:08

## 2023-08-02 RX ADMIN — TENOFOVIR DISOPROXIL FUMARATE 300 MG: 300 TABLET, FILM COATED ORAL at 08:08

## 2023-08-02 RX ADMIN — ENOXAPARIN SODIUM 40 MG: 40 INJECTION SUBCUTANEOUS at 05:08

## 2023-08-02 RX ADMIN — OXYCODONE HYDROCHLORIDE 10 MG: 5 TABLET ORAL at 02:08

## 2023-08-02 RX ADMIN — EMTRICITABINE 200 MG: 200 CAPSULE ORAL at 08:08

## 2023-08-02 RX ADMIN — ACETAMINOPHEN 650 MG: 325 TABLET ORAL at 11:08

## 2023-08-02 RX ADMIN — ACETAMINOPHEN 650 MG: 325 TABLET ORAL at 05:08

## 2023-08-02 RX ADMIN — IBUPROFEN 800 MG: 800 TABLET, FILM COATED ORAL at 01:08

## 2023-08-02 RX ADMIN — IBUPROFEN 800 MG: 800 TABLET, FILM COATED ORAL at 08:08

## 2023-08-02 RX ADMIN — IBUPROFEN 800 MG: 800 TABLET, FILM COATED ORAL at 04:08

## 2023-08-02 RX ADMIN — PRENATAL VIT W/ FE FUMARATE-FA TAB 27-0.8 MG 1 TABLET: 27-0.8 TAB at 08:08

## 2023-08-02 RX ADMIN — SODIUM CHLORIDE: 0.9 INJECTION, SOLUTION INTRAVENOUS at 08:08

## 2023-08-02 RX ADMIN — DIPHENHYDRAMINE HYDROCHLORIDE 25 MG: 25 CAPSULE ORAL at 07:08

## 2023-08-02 RX ADMIN — OXYCODONE HYDROCHLORIDE 10 MG: 5 TABLET ORAL at 06:08

## 2023-08-02 RX ADMIN — ACETAMINOPHEN 650 MG: 325 TABLET ORAL at 04:08

## 2023-08-02 RX ADMIN — DOCUSATE SODIUM 200 MG: 100 CAPSULE, LIQUID FILLED ORAL at 08:08

## 2023-08-02 RX ADMIN — OXYCODONE HYDROCHLORIDE 10 MG: 5 TABLET ORAL at 04:08

## 2023-08-02 RX ADMIN — RALTEGRAVIR 1200 MG: 400 TABLET, FILM COATED ORAL at 08:08

## 2023-08-02 NOTE — ASSESSMENT & PLAN NOTE
Pt asymptomatic.    Repeat CBC with H/H 5.9/18.6.  Recommend blood transfusion this am.  Pt agrees to proceed.

## 2023-08-02 NOTE — PLAN OF CARE
Problem: Adult Inpatient Plan of Care  Goal: Plan of Care Review  Outcome: Ongoing, Progressing  Goal: Patient-Specific Goal (Individualized)  Outcome: Ongoing, Progressing  Goal: Absence of Hospital-Acquired Illness or Injury  Outcome: Ongoing, Progressing  Goal: Optimal Comfort and Wellbeing  Outcome: Ongoing, Progressing  Goal: Readiness for Transition of Care  Outcome: Ongoing, Progressing     Problem:  Fall Injury Risk  Goal: Absence of Fall, Infant Drop and Related Injury  Outcome: Ongoing, Progressing     Problem: Adjustment to Role Transition (Postpartum  Delivery)  Goal: Successful Maternal Role Transition  Outcome: Ongoing, Progressing     Problem: Bleeding (Postpartum  Delivery)  Goal: Hemostasis  Outcome: Ongoing, Progressing     Problem: Infection (Postpartum  Delivery)  Goal: Absence of Infection Signs and Symptoms  Outcome: Ongoing, Progressing     Problem: Pain (Postpartum  Delivery)  Goal: Acceptable Pain Control  Outcome: Ongoing, Progressing     Problem: Postoperative Nausea and Vomiting (Postpartum  Delivery)  Goal: Nausea and Vomiting Relief  Outcome: Ongoing, Progressing     Problem: Postoperative Urinary Retention (Postpartum  Delivery)  Goal: Effective Urinary Elimination  Outcome: Ongoing, Progressing     Problem: Infection  Goal: Absence of Infection Signs and Symptoms  Outcome: Ongoing, Progressing

## 2023-08-02 NOTE — DISCHARGE INSTRUCTIONS
Postpartum Discharge Instructions:    · No heavy lifting, straining, take frequent rest periods  · Pelvic rest--no douching, tampons, or intercourse until released by MD  · Talk to your doctor about birth control--remember breastfeeding is not a method of birth control  · Notify MD if bleeding becomes heavier than usual and if large clots, painful cramping,or foul odor develops.   Vaginal discharge will lighten and decrease in amount gradually.    · Cleanse perineal area from front to back after urination or having a bowel movement.  · Tub soak or portable sitz bath at home.  Apply clean pad with Dermaplast and Tucks to perineal area.  · Episiotomy stitches will dissolve within 2-3 weeks  · If not breastfeeding, wear tight fitting sports bra for 1 week--remove only to bathe  · Remember to keep your breast clean and dry to prevent any cracking  · Notify MD if breast become reddened,swollen, nipples bleed or crack, or fever greater than 100.4  · Notify MD of pain, swelling,  Redness, or heat developing in back of leg especially when foot is flexed toward body  · Look at incision everyday for redness, swelling, or drainage which may indicate infection  · Notify MD of pain not relieved by pain medication.  · Call MD or go to ER for any concerns       Check blood pressures every 3-4 hours. If systolic pressure is 155 or greater AND/OR diastolic pressure is greater than 100, please return to ER.

## 2023-08-02 NOTE — PROGRESS NOTES
[facility] - Labor & Delivery  Obstetrics  Postpartum Progress Note    Patient Name: Nidhi Willis  MRN: [id number]  Admission Date: 2023  Hospital Length of Stay: 2 days  Attending Physician: Anna Domingo MD  Primary Care Provider: Primary Doctor No    Subjective:     Principal Problem:S/P repeat low transverse     Hospital Course:  Pt presents to L&D with regular contractions.  Will admit, start AZT and plan for repeat  following 3 hours of AZT.  POD#1 routine post op and pp care  POD#2 routine post op.  Pt with critically low H/H this am.  Pt remains asymptomatic and is ambulating in the room this am without complaint.  Agrees to transfusion.      Interval History: POD#2    She is doing well this morning. She is tolerating a regular diet without nausea or vomiting. She is voiding spontaneously. She is ambulating. She has passed flatus, and has not a BM. Vaginal bleeding is mild. She denies fever or chills. Abdominal pain is moderate and controlled with oral medications. She Is not breastfeeding. She desires circumcision for her male baby: not applicable.    Objective:     Vital Signs (Most Recent):  Temp: 97.4 °F (36.3 °C) (23)  Pulse: 98 (23)  Resp: 16 (23 0645)  BP: (Abnormal) 138/93 (23)  SpO2: 100 % (23) Vital Signs (24h Range):  Temp:  [97.2 °F (36.2 °C)-97.5 °F (36.4 °C)] 97.4 °F (36.3 °C)  Pulse:  [] 98  Resp:  [16-18] 16  SpO2:  [100 %] 100 %  BP: (124-156)/(81-93) 138/93     Weight: 85.4 kg (188 lb 3.2 oz)  Body mass index is 31.32 kg/m².      Intake/Output Summary (Last 24 hours) at 2023 0724  Last data filed at 2023 1350  Gross per 24 hour   Intake no documentation   Output 200 ml   Net -200 ml         Significant Labs:  Lab Results   Component Value Date    GROUPTRH O POS 2023    HEPBSAG Non-reactive 2023    STREPBCULT No Group B Streptococcus isolated 2023     Recent Labs   Lab  23  0632   HGB 5.9*   HCT 18.6*       I have personallly reviewed all pertinent lab results from the last 24 hours.    Physical Exam:   Constitutional: She is oriented to person, place, and time. She appears well-developed and well-nourished. No distress.    HENT:   Head: Normocephalic and atraumatic.    Eyes: Conjunctivae and EOM are normal.      Pulmonary/Chest: Effort normal.        Abdominal: Soft. She exhibits abdominal incision (aquacel dry, drainage stable from yesterday). There is abdominal tenderness (appropriate). There is no rebound and no guarding.   Bowel sounds present by hypoactive     Genitourinary:    Genitourinary Comments: Fundus firm below umbilicus  Lochia minimal             Musculoskeletal: Normal range of motion. No tenderness.       Neurological: She is alert and oriented to person, place, and time.    Skin: Skin is warm and dry.    Psychiatric: She has a normal mood and affect. Her behavior is normal. Thought content normal.       Review of Systems    Assessment/Plan:     23 y.o. female  for:    * S/P repeat low transverse   Routine post op care.      Postoperative anemia due to acute blood loss  Pt asymptomatic.    Repeat CBC with H/H 5.9/18.6.  Recommend blood transfusion this am.  Pt agrees to proceed.     HIV disease affecting pregnancy in third trimester  Continue home meds postpartum        Disposition: As patient meets milestones, will plan to discharge tomorrow.  Pt desires discharge today, will consider this pm.    Anna Domingo MD  Obstetrics  [facility] - Labor & Delivery

## 2023-08-02 NOTE — PLAN OF CARE
"Patient stable all shift. Assessment done as per flowsheet. Pain controlled with oral pain medications as prescribed. See MAR for medications. Patient showered and ambulated in room during shift, tolerated well. At 2230, patient c/o nausea, requested Zofran, when brought to pt., she refused it because she reported that the form provided made her vomit after taking it previously in the ER. Pt. Took home dose instead. Pt. Without vomiting or further episodes of nausea and tolerated snack well within the hour. Pt. C/O back pain where she got the spinal "tap". Heat pack provided and applied to site.  Mother and father bonding appropriately with infant, responsive to all cues. Mother continues to use feeding log. Patient in no apparent distress at this time, will continue to monitor.    "

## 2023-08-02 NOTE — SUBJECTIVE & OBJECTIVE
Interval History: POD#2    She is doing well this morning. She is tolerating a regular diet without nausea or vomiting. She is voiding spontaneously. She is ambulating. She has passed flatus, and has not a BM. Vaginal bleeding is mild. She denies fever or chills. Abdominal pain is moderate and controlled with oral medications. She Is not breastfeeding. She desires circumcision for her male baby: not applicable.    Objective:     Vital Signs (Most Recent):  Temp: 97.4 °F (36.3 °C) (08/02/23 0417)  Pulse: 98 (08/02/23 0417)  Resp: 16 (08/02/23 0645)  BP: (Abnormal) 138/93 (08/02/23 0417)  SpO2: 100 % (08/02/23 0417) Vital Signs (24h Range):  Temp:  [97.2 °F (36.2 °C)-97.5 °F (36.4 °C)] 97.4 °F (36.3 °C)  Pulse:  [] 98  Resp:  [16-18] 16  SpO2:  [100 %] 100 %  BP: (124-156)/(81-93) 138/93     Weight: 85.4 kg (188 lb 3.2 oz)  Body mass index is 31.32 kg/m².      Intake/Output Summary (Last 24 hours) at 8/2/2023 0724  Last data filed at 8/1/2023 1350  Gross per 24 hour   Intake no documentation   Output 200 ml   Net -200 ml         Significant Labs:  Lab Results   Component Value Date    GROUPTRH O POS 07/31/2023    HEPBSAG Non-reactive 01/03/2023    STREPBCULT No Group B Streptococcus isolated 07/19/2023     Recent Labs   Lab 08/02/23  0632   HGB 5.9*   HCT 18.6*       I have personallly reviewed all pertinent lab results from the last 24 hours.    Physical Exam:   Constitutional: She is oriented to person, place, and time. She appears well-developed and well-nourished. No distress.    HENT:   Head: Normocephalic and atraumatic.    Eyes: Conjunctivae and EOM are normal.      Pulmonary/Chest: Effort normal.        Abdominal: Soft. She exhibits abdominal incision (aquacel dry, drainage stable from yesterday). There is abdominal tenderness (appropriate). There is no rebound and no guarding.   Bowel sounds present by hypoactive     Genitourinary:    Genitourinary Comments: Fundus firm below umbilicus  Lochia minimal              Musculoskeletal: Normal range of motion. No tenderness.       Neurological: She is alert and oriented to person, place, and time.    Skin: Skin is warm and dry.    Psychiatric: She has a normal mood and affect. Her behavior is normal. Thought content normal.       Review of Systems

## 2023-08-03 NOTE — DISCHARGE SUMMARY
[facility] - Labor & Delivery  Obstetrics  Discharge Summary      Patient Name: Nidhi Willis  MRN: [id number]  Admission Date: 2023  Hospital Length of Stay: 2 days  Discharge Date and Time:  2023 7:20 PM  Attending Physician: Anna Domingo MD   Discharging Provider: Anna Domingo MD   Primary Care Provider: Sandi, Primary Doctor    HPI: Pt is a  @ 38 0/7 WGA who presents in labor with contractions every 2-5 minutes.  Pt has a h/o a  and desires repeat. Pregnancy complicated by HIV infection, h/o genital HSV.            Procedure(s) (LRB):  REPEAT  SECTION (N/A)     Hospital Course:   Pt presents to L&D with regular contractions.  Will admit, start AZT and plan for repeat  following 3 hours of AZT.  POD#1 routine post op and pp care  POD#2 routine post op.  Pt with critically low H/H this am.  Pt remains asymptomatic and is ambulating in the room this am without complaint.  Agrees to transfusion.       Consults (From admission, onward)        Status Ordering Provider     Inpatient consult to Anesthesiology  Once        Provider:  (Not yet assigned)    Acknowledged ANNA DOMINGO          Final Active Diagnoses:    Diagnosis Date Noted POA    PRINCIPAL PROBLEM:  S/P repeat low transverse  [Z98.891] 2023 Not Applicable    Postoperative anemia due to acute blood loss [D62] 2023 No    HIV disease affecting pregnancy in third trimester [O98.713] 2016 Yes      Problems Resolved During this Admission:    Diagnosis Date Noted Date Resolved POA    Normal labor [O80, Z37.9] 2017 Not Applicable        Significant Diagnostic Studies: N/A      Feeding Method: bottle    Immunizations     None          Delivery:    Episiotomy:     Lacerations:     Repair suture:     Repair # of packets:     Blood loss (ml):       Birth information:  YOB: 2025   Time of birth: 4:36 PM   Sex: female   Delivery type:  , Low Transverse   Gestational Age: 38w0d    Delivery Clinician:      Other providers:       Additional  information:  Forceps:    Vacuum:    Breech:    Observed anomalies      Living?:           APGARS  One minute Five minutes Ten minutes   Skin color:         Heart rate:         Grimace:         Muscle tone:         Breathing:         Totals: 8  9        Placenta: Delivered:       appearance    Pending Diagnostic Studies:     None          Discharged Condition: good    Disposition: Home or Self Care    Follow Up:   Follow-up Information     Anna Domingo MD. Go in 2 day(s).    Specialty: Obstetrics and Gynecology  Why: blood pressure check and remove bandage  Contact information:  [place] [address]  [phone]                       Patient Instructions:      Diet Adult Regular     Lifting restrictions   Order Comments: No lifting greater than 15 pounds     Pelvic Rest     Leave dressing on - Keep it clean, dry, and intact until clinic visit     Notify your health care provider if you experience any of the following:  temperature >100.4     Notify your health care provider if you experience any of the following:  persistent nausea and vomiting or diarrhea     Notify your health care provider if you experience any of the following:  severe uncontrolled pain     Notify your health care provider if you experience any of the following:  redness, tenderness, or signs of infection (pain, swelling, redness, odor or green/yellow discharge around incision site)     Notify your health care provider if you experience any of the following:  difficulty breathing or increased cough     Notify your health care provider if you experience any of the following:  severe persistent headache     Notify your health care provider if you experience any of the following:  worsening rash     Notify your health care provider if you experience any of the following:  persistent dizziness, light-headedness,  or visual disturbances     Notify your health care provider if you experience any of the following:  increased confusion or weakness     Notify your health care provider if you experience any of the following:   Order Comments: Heavy vaginal bleeding (saturating 2 pads in 1 hour)     Activity as tolerated     Medications:  Current Discharge Medication List      START taking these medications    Details   ibuprofen (ADVIL,MOTRIN) 800 MG tablet Take 1 tablet (800 mg total) by mouth every 8 (eight) hours.  Qty: 30 tablet, Refills: 0      oxyCODONE (ROXICODONE) 5 MG immediate release tablet Take 1 tablet (5 mg total) by mouth every 4 (four) hours as needed for Pain.  Qty: 30 tablet, Refills: 0    Comments: Quantity prescribed more than 7 day supply? No         CONTINUE these medications which have CHANGED    Details   ondansetron (ZOFRAN) 4 MG tablet Take 1 tablet (4 mg total) by mouth daily as needed for Nausea.  Qty: 30 tablet, Refills: 1    Associated Diagnoses: Nausea and vomiting in pregnancy         CONTINUE these medications which have NOT CHANGED    Details   emtricitabine-tenofovir 200-300 mg (TRUVADA) 200-300 mg Tab Take 1 tablet by mouth once daily.  Qty: 30 tablet, Refills: 3    Comments: Please fill Truvada and Isentress  Associated Diagnoses: HIV disease affecting pregnancy, third trimester; HIV, symptomatic      PNV62-FA-om3-dha-epa-fish oil (PRENATAL GUMMY) 400 mcg-35 mg -25 mg-5 mg Chew Take by mouth.      raltegravir (ISENTRESS HD) 600 mg tablet Take 2 tablets (1,200 mg total) by mouth once daily.  Qty: 60 tablet, Refills: 3    Associated Diagnoses: HIV, symptomatic      valACYclovir (VALTREX) 500 MG tablet Take 1 tablet (500 mg total) by mouth 2 (two) times daily.  Qty: 60 tablet, Refills: 0    Associated Diagnoses: Genital herpes affecting pregnancy in third trimester      albuterol (PROVENTIL/VENTOLIN HFA) 90 mcg/actuation inhaler Inhale 2 puffs into the lungs every 6 (six) hours as needed.              Anna Domingo MD  Obstetrics  [facility] - Labor & Delivery

## 2023-08-03 NOTE — PROGRESS NOTES
Patient seen and examined.    Pt with intermittent elevated BP through the day. No severe or persistent elevations requiring treatment.   Pt reports significant anxiety through the day and discomfort with the bed. States feeling anxious about having to stay another night.   She denies HA, vision changes, RUQ tenderness.   No edema.   DTRs normal.    Pt is s/p 3u PRBCs with appropriate rise in H/H.  Ambulating around room without difficulty.    I discussed my concern with her blood pressure and my recommendation to evaluate overnight.    Patient does not want to stay the night and says she feels that her anxiety will be worse in the hospital.   I discussed risk of developing pre-eclampsia, eclampsia with seizure, severe blood pressure with stroke.    Nidhi understands the risks and is requesting discharge home.   She will be at home with her partner and her mother.  They will check her blood pressure every 4 hours through the evening and night.  Patient was given parameters that if systolic BP > 155 or diastolic BP >100, she should return to the ER for evaluation and treatment.   She has a connected MOM BP cuff at home and agrees with this plan.    Will discharge home with close follow up in clinic Friday am or sooner if needed.

## 2023-08-04 ENCOUNTER — POSTPARTUM VISIT (OUTPATIENT)
Dept: OBSTETRICS AND GYNECOLOGY | Facility: CLINIC | Age: 23
End: 2023-08-04
Payer: MEDICAID

## 2023-08-04 VITALS
BODY MASS INDEX: 29.79 KG/M2 | DIASTOLIC BLOOD PRESSURE: 86 MMHG | WEIGHT: 178.81 LBS | HEART RATE: 108 BPM | HEIGHT: 65 IN | SYSTOLIC BLOOD PRESSURE: 130 MMHG

## 2023-08-04 DIAGNOSIS — Z13.32 ENCOUNTER FOR SCREENING FOR MATERNAL DEPRESSION: ICD-10-CM

## 2023-08-04 DIAGNOSIS — Z48.02 ENCOUNTER FOR STAPLE REMOVAL: ICD-10-CM

## 2023-08-04 DIAGNOSIS — O99.891: ICD-10-CM

## 2023-08-04 PROCEDURE — 59430 PR CARE AFTER DELIVERY ONLY: ICD-10-PCS | Mod: ,,, | Performed by: OBSTETRICS & GYNECOLOGY

## 2023-08-04 PROCEDURE — 99999 PR PBB SHADOW E&M-EST. PATIENT-LVL III: CPT | Mod: PBBFAC,,, | Performed by: OBSTETRICS & GYNECOLOGY

## 2023-08-04 PROCEDURE — 99999 PR PBB SHADOW E&M-EST. PATIENT-LVL III: ICD-10-PCS | Mod: PBBFAC,,, | Performed by: OBSTETRICS & GYNECOLOGY

## 2023-08-04 PROCEDURE — 96160 PT-FOCUSED HLTH RISK ASSMT: CPT | Mod: PBBFAC | Performed by: OBSTETRICS & GYNECOLOGY

## 2023-08-04 PROCEDURE — 99213 OFFICE O/P EST LOW 20 MIN: CPT | Mod: PBBFAC,TH | Performed by: OBSTETRICS & GYNECOLOGY

## 2023-08-04 PROCEDURE — 96160 PT-FOCUSED HLTH RISK ASSMT: CPT | Mod: S$PBB,,, | Performed by: OBSTETRICS & GYNECOLOGY

## 2023-08-04 PROCEDURE — 96160 PR PT FOCUSED HLTH RISK ASSMT: ICD-10-PCS | Mod: S$PBB,,, | Performed by: OBSTETRICS & GYNECOLOGY

## 2023-08-04 RX ORDER — FUROSEMIDE 20 MG/1
20 TABLET ORAL DAILY
Qty: 2 TABLET | Refills: 0 | Status: ON HOLD | OUTPATIENT
Start: 2023-08-04 | End: 2023-08-15 | Stop reason: HOSPADM

## 2023-08-04 NOTE — PROGRESS NOTES
CC: Post-partum follow-up    Nidhi Willis is a 23 y.o. female  presents for a postpartum visit.  She is status post   RLTCS  4 days ago.  Her hospitalization was complicated by needing postop blood transfusion and elevations in BP prior to discharge.  She is not breastfeeding.  She desires Depo-Provera for contraception.  She denies postpartum depression. [place] depression scale score 1. She and the baby are doing well.  No pain.  No fever.   No bowel / bladder complaints.       Her last pap was 2023      ROS:  GENERAL: No fever, chills, fatigability.  VULVAR: No pain, no lesions and no itching.  VAGINAL: No relaxation, no itching, no discharge, no abnormal bleeding and no lesions.  ABDOMEN: No abdominal pain. Denies nausea. Denies vomiting. No diarrhea. No constipation  BREAST: Denies pain. No lumps. No discharge.  URINARY: No incontinence, no nocturia, no frequency and no dysuria.  CARDIOVASCULAR: No chest pain. No shortness of breath. No leg cramps.  NEUROLOGICAL: No headaches. No vision changes.    Past Medical History:   Diagnosis Date    Herpes simplex virus (HSV) infection     HIV infection     HIV positive     Hx of tonsillectomy     Seizures      Past Surgical History:   Procedure Laterality Date     SECTION  2017     SECTION N/A 2023    Procedure: REPEAT  SECTION;  Surgeon: Anna Domingo MD;  Location: [facility];  Service: OB/GYN;  Laterality: N/A;    TONSILLECTOMY      tubes in ears       Review of patient's allergies indicates:   Allergen Reactions    Aspirin Hives       Current Outpatient Medications:     albuterol (PROVENTIL/VENTOLIN HFA) 90 mcg/actuation inhaler, Inhale 2 puffs into the lungs every 6 (six) hours as needed., Disp: , Rfl:     emtricitabine-tenofovir 200-300 mg (TRUVADA) 200-300 mg Tab, Take 1 tablet by mouth once daily., Disp: 30 tablet, Rfl: 3    ibuprofen (ADVIL,MOTRIN) 800 MG tablet, Take 1 tablet (800 mg total) by  mouth every 8 (eight) hours., Disp: 30 tablet, Rfl: 0    ondansetron (ZOFRAN) 4 MG tablet, Take 1 tablet (4 mg total) by mouth daily as needed for Nausea., Disp: 30 tablet, Rfl: 1    oxyCODONE (ROXICODONE) 5 MG immediate release tablet, Take 1 tablet (5 mg total) by mouth every 4 (four) hours as needed for Pain., Disp: 30 tablet, Rfl: 0    PNV62-FA-om3-dha-epa-fish oil (PRENATAL GUMMY) 400 mcg-35 mg -25 mg-5 mg Chew, Take by mouth., Disp: , Rfl:     raltegravir (ISENTRESS HD) 600 mg tablet, Take 2 tablets (1,200 mg total) by mouth once daily., Disp: 60 tablet, Rfl: 3    valACYclovir (VALTREX) 500 MG tablet, Take 1 tablet (500 mg total) by mouth 2 (two) times daily., Disp: 60 tablet, Rfl: 0    furosemide (LASIX) 20 MG tablet, Take 1 tablet (20 mg total) by mouth once daily. for 2 doses, Disp: 2 tablet, Rfl: 0      Vitals:    08/04/23 1033   BP: 130/86   Pulse: 108       Physical Exam  Vitals reviewed.   Constitutional:       General: She is not in acute distress.     Appearance: She is well-developed.   HENT:      Head: Normocephalic and atraumatic.   Eyes:      Conjunctiva/sclera: Conjunctivae normal.   Pulmonary:      Effort: Pulmonary effort is normal.   Abdominal:      General: There is no distension.      Palpations: Abdomen is soft.      Tenderness: There is no abdominal tenderness. There is no guarding or rebound.      Comments: Incision: Aquacel removed. Staples removed. Steris placed. Healing well.   Musculoskeletal:      Cervical back: Normal range of motion and neck supple.   Neurological:      Mental Status: She is alert and oriented to person, place, and time.   Psychiatric:         Behavior: Behavior normal.         Thought Content: Thought content normal.         Judgment: Judgment normal.           Assessment:    1. Normal postpartum exam  2. Doing well S/P  RLTCS      Plan:    1. Routine follow up.  2. Instructions / precautions reviewed  3. Contraceptive counseling  4. Reviewed home BP - will call  to schedule nurse BP check next week.  5. Return: in 5 weeks or prn.

## 2023-08-07 ENCOUNTER — HOSPITAL ENCOUNTER (OUTPATIENT)
Dept: PREADMISSION TESTING | Facility: HOSPITAL | Age: 23
Discharge: HOME | End: 2023-08-07
Payer: MEDICAID

## 2023-08-13 ENCOUNTER — PATIENT MESSAGE (OUTPATIENT)
Dept: OBSTETRICS AND GYNECOLOGY | Facility: CLINIC | Age: 23
End: 2023-08-13
Payer: MEDICAID

## 2023-08-13 ENCOUNTER — HOSPITAL ENCOUNTER (INPATIENT)
Facility: HOSPITAL | Age: 23
LOS: 2 days | Discharge: HOME | DRG: 776 | End: 2023-08-15
Attending: STUDENT IN AN ORGANIZED HEALTH CARE EDUCATION/TRAINING PROGRAM | Admitting: STUDENT IN AN ORGANIZED HEALTH CARE EDUCATION/TRAINING PROGRAM
Payer: MEDICAID

## 2023-08-13 DIAGNOSIS — N99.840 POSTOPERATIVE HEMATOMA INVOLVING GENITOURINARY SYSTEM FOLLOWING GENITOURINARY PROCEDURE: ICD-10-CM

## 2023-08-13 DIAGNOSIS — G89.18 POST-OPERATIVE PAIN: ICD-10-CM

## 2023-08-13 DIAGNOSIS — Z48.89 ENCOUNTER FOR POST SURGICAL WOUND CHECK: Primary | ICD-10-CM

## 2023-08-13 DIAGNOSIS — Z98.891 S/P REPEAT LOW TRANSVERSE C-SECTION: ICD-10-CM

## 2023-08-13 DIAGNOSIS — S30.1XXA ABDOMINAL HEMATOMA: ICD-10-CM

## 2023-08-13 DIAGNOSIS — R07.9 CHEST PAIN, UNSPECIFIED TYPE: ICD-10-CM

## 2023-08-13 DIAGNOSIS — O98.713 HIV DISEASE AFFECTING PREGNANCY IN THIRD TRIMESTER: ICD-10-CM

## 2023-08-13 DIAGNOSIS — B20 HIV, SYMPTOMATIC: ICD-10-CM

## 2023-08-13 LAB
ABO + RH BLD: NORMAL
ALBUMIN SERPL BCP-MCNC: 2.8 G/DL (ref 3.5–5.2)
ALP SERPL-CCNC: 119 U/L (ref 55–135)
ALT SERPL W/O P-5'-P-CCNC: 13 U/L (ref 10–44)
ANION GAP SERPL CALC-SCNC: 12 MMOL/L (ref 8–16)
APTT PPP: 29.4 SEC (ref 21–32)
AST SERPL-CCNC: 13 U/L (ref 10–40)
BASOPHILS # BLD AUTO: 0.04 K/UL (ref 0–0.2)
BASOPHILS NFR BLD: 0.2 % (ref 0–1.9)
BILIRUB SERPL-MCNC: 0.9 MG/DL (ref 0.1–1)
BLD GP AB SCN CELLS X3 SERPL QL: NORMAL
BUN SERPL-MCNC: 9 MG/DL (ref 6–20)
CALCIUM SERPL-MCNC: 8.8 MG/DL (ref 8.7–10.5)
CHLORIDE SERPL-SCNC: 106 MMOL/L (ref 95–110)
CO2 SERPL-SCNC: 22 MMOL/L (ref 23–29)
CREAT SERPL-MCNC: 0.8 MG/DL (ref 0.5–1.4)
DIFFERENTIAL METHOD: ABNORMAL
EOSINOPHIL # BLD AUTO: 0.2 K/UL (ref 0–0.5)
EOSINOPHIL NFR BLD: 0.9 % (ref 0–8)
ERYTHROCYTE [DISTWIDTH] IN BLOOD BY AUTOMATED COUNT: 16.1 % (ref 11.5–14.5)
EST. GFR  (NO RACE VARIABLE): >60 ML/MIN/1.73 M^2
GLUCOSE SERPL-MCNC: 94 MG/DL (ref 70–110)
HCT VFR BLD AUTO: 31.6 % (ref 37–48.5)
HGB BLD-MCNC: 10 G/DL (ref 12–16)
IMM GRANULOCYTES # BLD AUTO: 0.07 K/UL (ref 0–0.04)
IMM GRANULOCYTES NFR BLD AUTO: 0.4 % (ref 0–0.5)
INR PPP: 1.2 (ref 0.8–1.2)
LYMPHOCYTES # BLD AUTO: 2.4 K/UL (ref 1–4.8)
LYMPHOCYTES NFR BLD: 14.1 % (ref 18–48)
MCH RBC QN AUTO: 24.9 PG (ref 27–31)
MCHC RBC AUTO-ENTMCNC: 31.6 G/DL (ref 32–36)
MCV RBC AUTO: 79 FL (ref 82–98)
MONOCYTES # BLD AUTO: 1.3 K/UL (ref 0.3–1)
MONOCYTES NFR BLD: 7.7 % (ref 4–15)
NEUTROPHILS # BLD AUTO: 13 K/UL (ref 1.8–7.7)
NEUTROPHILS NFR BLD: 76.7 % (ref 38–73)
NRBC BLD-RTO: 0 /100 WBC
PLATELET # BLD AUTO: 474 K/UL (ref 150–450)
PMV BLD AUTO: 9.7 FL (ref 9.2–12.9)
POTASSIUM SERPL-SCNC: 3.3 MMOL/L (ref 3.5–5.1)
PROT SERPL-MCNC: 7.1 G/DL (ref 6–8.4)
PROTHROMBIN TIME: 12.3 SEC (ref 9–12.5)
RBC # BLD AUTO: 4.01 M/UL (ref 4–5.4)
SODIUM SERPL-SCNC: 140 MMOL/L (ref 136–145)
SPECIMEN OUTDATE: NORMAL
WBC # BLD AUTO: 16.97 K/UL (ref 3.9–12.7)

## 2023-08-13 PROCEDURE — 85025 COMPLETE CBC W/AUTO DIFF WBC: CPT | Performed by: STUDENT IN AN ORGANIZED HEALTH CARE EDUCATION/TRAINING PROGRAM

## 2023-08-13 PROCEDURE — 25500020 PHARM REV CODE 255: Performed by: STUDENT IN AN ORGANIZED HEALTH CARE EDUCATION/TRAINING PROGRAM

## 2023-08-13 PROCEDURE — 36415 COLL VENOUS BLD VENIPUNCTURE: CPT | Performed by: STUDENT IN AN ORGANIZED HEALTH CARE EDUCATION/TRAINING PROGRAM

## 2023-08-13 PROCEDURE — 80053 COMPREHEN METABOLIC PANEL: CPT | Performed by: STUDENT IN AN ORGANIZED HEALTH CARE EDUCATION/TRAINING PROGRAM

## 2023-08-13 PROCEDURE — 99285 EMERGENCY DEPT VISIT HI MDM: CPT | Mod: 25

## 2023-08-13 PROCEDURE — 85610 PROTHROMBIN TIME: CPT | Performed by: STUDENT IN AN ORGANIZED HEALTH CARE EDUCATION/TRAINING PROGRAM

## 2023-08-13 PROCEDURE — 25000003 PHARM REV CODE 250: Performed by: STUDENT IN AN ORGANIZED HEALTH CARE EDUCATION/TRAINING PROGRAM

## 2023-08-13 PROCEDURE — 96375 TX/PRO/DX INJ NEW DRUG ADDON: CPT

## 2023-08-13 PROCEDURE — 11000001 HC ACUTE MED/SURG PRIVATE ROOM

## 2023-08-13 PROCEDURE — 85730 THROMBOPLASTIN TIME PARTIAL: CPT | Performed by: STUDENT IN AN ORGANIZED HEALTH CARE EDUCATION/TRAINING PROGRAM

## 2023-08-13 PROCEDURE — 96374 THER/PROPH/DIAG INJ IV PUSH: CPT

## 2023-08-13 PROCEDURE — 63600175 PHARM REV CODE 636 W HCPCS: Performed by: STUDENT IN AN ORGANIZED HEALTH CARE EDUCATION/TRAINING PROGRAM

## 2023-08-13 PROCEDURE — 86900 BLOOD TYPING SEROLOGIC ABO: CPT | Performed by: STUDENT IN AN ORGANIZED HEALTH CARE EDUCATION/TRAINING PROGRAM

## 2023-08-13 RX ORDER — MORPHINE SULFATE 2 MG/ML
4 INJECTION, SOLUTION INTRAMUSCULAR; INTRAVENOUS
Status: COMPLETED | OUTPATIENT
Start: 2023-08-13 | End: 2023-08-13

## 2023-08-13 RX ORDER — LIDOCAINE HYDROCHLORIDE 10 MG/ML
5 INJECTION, SOLUTION EPIDURAL; INFILTRATION; INTRACAUDAL; PERINEURAL
Status: COMPLETED | OUTPATIENT
Start: 2023-08-13 | End: 2023-08-13

## 2023-08-13 RX ORDER — ONDANSETRON 2 MG/ML
4 INJECTION INTRAMUSCULAR; INTRAVENOUS
Status: COMPLETED | OUTPATIENT
Start: 2023-08-13 | End: 2023-08-13

## 2023-08-13 RX ADMIN — MORPHINE SULFATE 4 MG: 2 INJECTION, SOLUTION INTRAMUSCULAR; INTRAVENOUS at 09:08

## 2023-08-13 RX ADMIN — IOHEXOL 75 ML: 350 INJECTION, SOLUTION INTRAVENOUS at 10:08

## 2023-08-13 RX ADMIN — ONDANSETRON HYDROCHLORIDE 4 MG: 2 SOLUTION INTRAMUSCULAR; INTRAVENOUS at 09:08

## 2023-08-13 RX ADMIN — LIDOCAINE HYDROCHLORIDE 50 MG: 10 INJECTION, SOLUTION EPIDURAL; INFILTRATION; INTRACAUDAL; PERINEURAL at 10:08

## 2023-08-14 PROBLEM — N99.840 POSTOPERATIVE HEMATOMA INVOLVING GENITOURINARY SYSTEM FOLLOWING GENITOURINARY PROCEDURE: Status: ACTIVE | Noted: 2023-08-14

## 2023-08-14 LAB
ALBUMIN SERPL BCP-MCNC: 2.7 G/DL (ref 3.5–5.2)
ALP SERPL-CCNC: 113 U/L (ref 55–135)
ALT SERPL W/O P-5'-P-CCNC: 12 U/L (ref 10–44)
ANION GAP SERPL CALC-SCNC: 14 MMOL/L (ref 8–16)
AST SERPL-CCNC: 14 U/L (ref 10–40)
BASOPHILS # BLD AUTO: 0.03 K/UL (ref 0–0.2)
BASOPHILS NFR BLD: 0.2 % (ref 0–1.9)
BILIRUB SERPL-MCNC: 0.9 MG/DL (ref 0.1–1)
BUN SERPL-MCNC: 6 MG/DL (ref 6–20)
CALCIUM SERPL-MCNC: 8.8 MG/DL (ref 8.7–10.5)
CHLORIDE SERPL-SCNC: 106 MMOL/L (ref 95–110)
CO2 SERPL-SCNC: 23 MMOL/L (ref 23–29)
CREAT SERPL-MCNC: 0.7 MG/DL (ref 0.5–1.4)
DIFFERENTIAL METHOD: ABNORMAL
EOSINOPHIL # BLD AUTO: 0.2 K/UL (ref 0–0.5)
EOSINOPHIL NFR BLD: 1.2 % (ref 0–8)
ERYTHROCYTE [DISTWIDTH] IN BLOOD BY AUTOMATED COUNT: 15.9 % (ref 11.5–14.5)
EST. GFR  (NO RACE VARIABLE): >60 ML/MIN/1.73 M^2
GLUCOSE SERPL-MCNC: 81 MG/DL (ref 70–110)
HCT VFR BLD AUTO: 29.9 % (ref 37–48.5)
HGB BLD-MCNC: 9.5 G/DL (ref 12–16)
IMM GRANULOCYTES # BLD AUTO: 0.08 K/UL (ref 0–0.04)
IMM GRANULOCYTES NFR BLD AUTO: 0.6 % (ref 0–0.5)
LYMPHOCYTES # BLD AUTO: 1.7 K/UL (ref 1–4.8)
LYMPHOCYTES NFR BLD: 13.6 % (ref 18–48)
MCH RBC QN AUTO: 25 PG (ref 27–31)
MCHC RBC AUTO-ENTMCNC: 31.8 G/DL (ref 32–36)
MCV RBC AUTO: 79 FL (ref 82–98)
MONOCYTES # BLD AUTO: 0.9 K/UL (ref 0.3–1)
MONOCYTES NFR BLD: 6.9 % (ref 4–15)
NEUTROPHILS # BLD AUTO: 9.8 K/UL (ref 1.8–7.7)
NEUTROPHILS NFR BLD: 77.5 % (ref 38–73)
NRBC BLD-RTO: 0 /100 WBC
PLATELET # BLD AUTO: 504 K/UL (ref 150–450)
PMV BLD AUTO: 10.2 FL (ref 9.2–12.9)
POTASSIUM SERPL-SCNC: 2.8 MMOL/L (ref 3.5–5.1)
PROT SERPL-MCNC: 6.8 G/DL (ref 6–8.4)
RBC # BLD AUTO: 3.8 M/UL (ref 4–5.4)
SODIUM SERPL-SCNC: 143 MMOL/L (ref 136–145)
WBC # BLD AUTO: 12.66 K/UL (ref 3.9–12.7)

## 2023-08-14 PROCEDURE — 99213 OFFICE O/P EST LOW 20 MIN: CPT | Mod: TH,,, | Performed by: OBSTETRICS & GYNECOLOGY

## 2023-08-14 PROCEDURE — 63600175 PHARM REV CODE 636 W HCPCS: Performed by: STUDENT IN AN ORGANIZED HEALTH CARE EDUCATION/TRAINING PROGRAM

## 2023-08-14 PROCEDURE — 99213 PR OFFICE/OUTPT VISIT, EST, LEVL III, 20-29 MIN: ICD-10-PCS | Mod: TH,,, | Performed by: OBSTETRICS & GYNECOLOGY

## 2023-08-14 PROCEDURE — 11000001 HC ACUTE MED/SURG PRIVATE ROOM

## 2023-08-14 PROCEDURE — 63600175 PHARM REV CODE 636 W HCPCS: Performed by: OBSTETRICS & GYNECOLOGY

## 2023-08-14 PROCEDURE — 36415 COLL VENOUS BLD VENIPUNCTURE: CPT | Performed by: STUDENT IN AN ORGANIZED HEALTH CARE EDUCATION/TRAINING PROGRAM

## 2023-08-14 PROCEDURE — 85025 COMPLETE CBC W/AUTO DIFF WBC: CPT | Performed by: STUDENT IN AN ORGANIZED HEALTH CARE EDUCATION/TRAINING PROGRAM

## 2023-08-14 PROCEDURE — 80053 COMPREHEN METABOLIC PANEL: CPT | Performed by: STUDENT IN AN ORGANIZED HEALTH CARE EDUCATION/TRAINING PROGRAM

## 2023-08-14 PROCEDURE — 25000003 PHARM REV CODE 250: Performed by: STUDENT IN AN ORGANIZED HEALTH CARE EDUCATION/TRAINING PROGRAM

## 2023-08-14 RX ORDER — SODIUM CHLORIDE 0.9 % (FLUSH) 0.9 %
10 SYRINGE (ML) INJECTION
Status: DISCONTINUED | OUTPATIENT
Start: 2023-08-14 | End: 2023-08-15 | Stop reason: HOSPADM

## 2023-08-14 RX ORDER — HYDRALAZINE HYDROCHLORIDE 20 MG/ML
10 INJECTION INTRAMUSCULAR; INTRAVENOUS ONCE AS NEEDED
Status: DISCONTINUED | OUTPATIENT
Start: 2023-08-14 | End: 2023-08-15 | Stop reason: HOSPADM

## 2023-08-14 RX ORDER — LABETALOL HYDROCHLORIDE 5 MG/ML
40 INJECTION, SOLUTION INTRAVENOUS ONCE AS NEEDED
Status: DISCONTINUED | OUTPATIENT
Start: 2023-08-14 | End: 2023-08-15 | Stop reason: HOSPADM

## 2023-08-14 RX ORDER — EMTRICITABINE 200 MG/1
200 CAPSULE ORAL DAILY
Status: DISCONTINUED | OUTPATIENT
Start: 2023-08-14 | End: 2023-08-15 | Stop reason: HOSPADM

## 2023-08-14 RX ORDER — IBUPROFEN 800 MG/1
800 TABLET ORAL EVERY 6 HOURS PRN
Status: DISCONTINUED | OUTPATIENT
Start: 2023-08-14 | End: 2023-08-14

## 2023-08-14 RX ORDER — ACETAMINOPHEN 325 MG/1
650 TABLET ORAL EVERY 8 HOURS PRN
Status: DISCONTINUED | OUTPATIENT
Start: 2023-08-14 | End: 2023-08-15 | Stop reason: HOSPADM

## 2023-08-14 RX ORDER — TALC
6 POWDER (GRAM) TOPICAL NIGHTLY PRN
Status: DISCONTINUED | OUTPATIENT
Start: 2023-08-14 | End: 2023-08-15 | Stop reason: HOSPADM

## 2023-08-14 RX ORDER — ONDANSETRON 2 MG/ML
4 INJECTION INTRAMUSCULAR; INTRAVENOUS EVERY 8 HOURS PRN
Status: DISCONTINUED | OUTPATIENT
Start: 2023-08-14 | End: 2023-08-15 | Stop reason: HOSPADM

## 2023-08-14 RX ORDER — HYDROMORPHONE HYDROCHLORIDE 1 MG/ML
1 INJECTION, SOLUTION INTRAMUSCULAR; INTRAVENOUS; SUBCUTANEOUS
Status: COMPLETED | OUTPATIENT
Start: 2023-08-14 | End: 2023-08-14

## 2023-08-14 RX ORDER — LABETALOL HYDROCHLORIDE 5 MG/ML
80 INJECTION, SOLUTION INTRAVENOUS ONCE AS NEEDED
Status: DISCONTINUED | OUTPATIENT
Start: 2023-08-14 | End: 2023-08-15 | Stop reason: HOSPADM

## 2023-08-14 RX ORDER — MAGNESIUM SULFATE HEPTAHYDRATE 40 MG/ML
2 INJECTION, SOLUTION INTRAVENOUS CONTINUOUS
Status: DISCONTINUED | OUTPATIENT
Start: 2023-08-14 | End: 2023-08-15 | Stop reason: HOSPADM

## 2023-08-14 RX ORDER — TENOFOVIR DISOPROXIL FUMARATE 300 MG/1
300 TABLET, FILM COATED ORAL DAILY
Status: DISCONTINUED | OUTPATIENT
Start: 2023-08-14 | End: 2023-08-15 | Stop reason: HOSPADM

## 2023-08-14 RX ORDER — LABETALOL HYDROCHLORIDE 5 MG/ML
20 INJECTION, SOLUTION INTRAVENOUS ONCE
Status: COMPLETED | OUTPATIENT
Start: 2023-08-14 | End: 2023-08-14

## 2023-08-14 RX ORDER — LABETALOL HYDROCHLORIDE 5 MG/ML
20 INJECTION, SOLUTION INTRAVENOUS ONCE AS NEEDED
Status: COMPLETED | OUTPATIENT
Start: 2023-08-14 | End: 2023-08-14

## 2023-08-14 RX ORDER — CALCIUM GLUCONATE 98 MG/ML
1 INJECTION, SOLUTION INTRAVENOUS
Status: DISCONTINUED | OUTPATIENT
Start: 2023-08-14 | End: 2023-08-15 | Stop reason: HOSPADM

## 2023-08-14 RX ORDER — MAGNESIUM SULFATE HEPTAHYDRATE 40 MG/ML
4 INJECTION, SOLUTION INTRAVENOUS ONCE
Status: COMPLETED | OUTPATIENT
Start: 2023-08-14 | End: 2023-08-14

## 2023-08-14 RX ORDER — IBUPROFEN 800 MG/1
800 TABLET ORAL EVERY 6 HOURS PRN
Status: DISCONTINUED | OUTPATIENT
Start: 2023-08-14 | End: 2023-08-15 | Stop reason: HOSPADM

## 2023-08-14 RX ORDER — VALACYCLOVIR HYDROCHLORIDE 500 MG/1
500 TABLET, FILM COATED ORAL 2 TIMES DAILY
Status: DISCONTINUED | OUTPATIENT
Start: 2023-08-14 | End: 2023-08-15 | Stop reason: HOSPADM

## 2023-08-14 RX ORDER — EMTRICITABINE AND TENOFOVIR DISOPROXIL FUMARATE 200; 300 MG/1; MG/1
1 TABLET, FILM COATED ORAL DAILY
Status: DISCONTINUED | OUTPATIENT
Start: 2023-08-14 | End: 2023-08-14

## 2023-08-14 RX ORDER — HYDROMORPHONE HYDROCHLORIDE 1 MG/ML
1 INJECTION, SOLUTION INTRAMUSCULAR; INTRAVENOUS; SUBCUTANEOUS EVERY 4 HOURS PRN
Status: DISCONTINUED | OUTPATIENT
Start: 2023-08-14 | End: 2023-08-15 | Stop reason: HOSPADM

## 2023-08-14 RX ORDER — SODIUM CHLORIDE, SODIUM LACTATE, POTASSIUM CHLORIDE, CALCIUM CHLORIDE 600; 310; 30; 20 MG/100ML; MG/100ML; MG/100ML; MG/100ML
INJECTION, SOLUTION INTRAVENOUS CONTINUOUS
Status: DISCONTINUED | OUTPATIENT
Start: 2023-08-14 | End: 2023-08-15 | Stop reason: HOSPADM

## 2023-08-14 RX ADMIN — LABETALOL HYDROCHLORIDE 20 MG: 5 INJECTION INTRAVENOUS at 09:08

## 2023-08-14 RX ADMIN — VALACYCLOVIR HYDROCHLORIDE 500 MG: 500 TABLET, FILM COATED ORAL at 10:08

## 2023-08-14 RX ADMIN — HYDROMORPHONE HYDROCHLORIDE 1 MG: 1 INJECTION, SOLUTION INTRAMUSCULAR; INTRAVENOUS; SUBCUTANEOUS at 05:08

## 2023-08-14 RX ADMIN — HYDROMORPHONE HYDROCHLORIDE 1 MG: 1 INJECTION, SOLUTION INTRAMUSCULAR; INTRAVENOUS; SUBCUTANEOUS at 10:08

## 2023-08-14 RX ADMIN — TENOFOVIR DISOPROXIL FUMARATE 300 MG: 300 TABLET, FILM COATED ORAL at 10:08

## 2023-08-14 RX ADMIN — MAGNESIUM SULFATE IN WATER 2 G/HR: 40 INJECTION, SOLUTION INTRAVENOUS at 09:08

## 2023-08-14 RX ADMIN — LABETALOL HYDROCHLORIDE 20 MG: 5 INJECTION INTRAVENOUS at 06:08

## 2023-08-14 RX ADMIN — ONDANSETRON HYDROCHLORIDE 4 MG: 2 SOLUTION INTRAMUSCULAR; INTRAVENOUS at 11:08

## 2023-08-14 RX ADMIN — SODIUM CHLORIDE, POTASSIUM CHLORIDE, SODIUM LACTATE AND CALCIUM CHLORIDE: 600; 310; 30; 20 INJECTION, SOLUTION INTRAVENOUS at 09:08

## 2023-08-14 RX ADMIN — RALTEGRAVIR 1200 MG: 400 TABLET, FILM COATED ORAL at 10:08

## 2023-08-14 RX ADMIN — EMTRICITABINE 200 MG: 200 CAPSULE ORAL at 10:08

## 2023-08-14 RX ADMIN — ONDANSETRON HYDROCHLORIDE 4 MG: 2 SOLUTION INTRAMUSCULAR; INTRAVENOUS at 01:08

## 2023-08-14 RX ADMIN — ONDANSETRON HYDROCHLORIDE 4 MG: 2 SOLUTION INTRAMUSCULAR; INTRAVENOUS at 10:08

## 2023-08-14 RX ADMIN — MAGNESIUM SULFATE HEPTAHYDRATE 4 G: 40 INJECTION, SOLUTION INTRAVENOUS at 09:08

## 2023-08-14 RX ADMIN — HYDROMORPHONE HYDROCHLORIDE 1 MG: 1 INJECTION, SOLUTION INTRAMUSCULAR; INTRAVENOUS; SUBCUTANEOUS at 01:08

## 2023-08-14 RX ADMIN — SODIUM CHLORIDE, POTASSIUM CHLORIDE, SODIUM LACTATE AND CALCIUM CHLORIDE: 600; 310; 30; 20 INJECTION, SOLUTION INTRAVENOUS at 10:08

## 2023-08-14 RX ADMIN — ACETAMINOPHEN 650 MG: 325 TABLET ORAL at 02:08

## 2023-08-14 NOTE — NURSING
Called OR area b/c doctor on call is scheduled in a OR case today. Spoke with Roby, anesthesia who reports Dr. Domingo is in a case as we speak. Brief discussion of patient status given to Roby to update Dr. Domingo whenever available.

## 2023-08-14 NOTE — ASSESSMENT & PLAN NOTE
Hematoma spontaneously draining from skin incision.  Pressure dressing applied.  Skin now soft and with less pain that presentation per patient.   H/H reviewed and is stable with improvement from postpartum discharge

## 2023-08-14 NOTE — H&P
"[facility] (3rd Fl)  Obstetrics & Gynecology  History & Physical    Patient Name: Nidhi Willis  MRN: [id number]  Admission Date: 2023  Primary Care Provider: Sandi, Primary Doctor    Subjective:     Chief Complaint/Reason for Admission: postpartum pre-eclampsia and hematoma    History of Present Illness:  Pt is a  @ 38 0/7 WGA who presented to the ER with abdominal pain and bloody drainage from her  incision.  She is s/p RLTCS on 23; hospital course complicated by post op blood transfusion.  Pt has been monitoring BP at home and denies any above severe range parameters, but states that they have been "very close". She also reports some chest heaviness.          OB History    Para Term  AB Living   2 2 2 0 0 1   SAB IAB Ectopic Multiple Live Births   0 0 0 0 1      # Outcome Date GA Lbr Steve/2nd Weight Sex Delivery Anes PTL Lv   2 Term 23 38w0d  3.515 kg (7 lb 12 oz) F CS-LTranv Spinal N BOB      Complications: Meconium passage during delivery      Name: PRINCE WILLIS      Apgar1: 8  Apgar5: 9   1 Term 17 37w1d  2.91 kg (6 lb 6.7 oz) M CS-LTranv Spinal        Name: RO WILLIS      Apgar1: 7  Apgar5: 9     Past Medical History:   Diagnosis Date    Herpes simplex virus (HSV) infection     HIV infection     HIV positive     Hx of tonsillectomy     Seizures      Past Surgical History:   Procedure Laterality Date     SECTION  2017     SECTION N/A 2023    Procedure: REPEAT  SECTION;  Surgeon: Anna Domingo MD;  Location: [facility];  Service: OB/GYN;  Laterality: N/A;    TONSILLECTOMY      tubes in ears         PTA Medications   Medication Sig    emtricitabine-tenofovir 200-300 mg (TRUVADA) 200-300 mg Tab Take 1 tablet by mouth once daily.    ibuprofen (ADVIL,MOTRIN) 800 MG tablet Take 1 tablet (800 mg total) by mouth every 8 (eight) hours.    ondansetron (ZOFRAN) 4 MG tablet Take 1 tablet (4 " mg total) by mouth daily as needed for Nausea.    oxyCODONE (ROXICODONE) 5 MG immediate release tablet Take 1 tablet (5 mg total) by mouth every 4 (four) hours as needed for Pain.    raltegravir (ISENTRESS HD) 600 mg tablet Take 2 tablets (1,200 mg total) by mouth once daily.    valACYclovir (VALTREX) 500 MG tablet Take 1 tablet (500 mg total) by mouth 2 (two) times daily.    albuterol (PROVENTIL/VENTOLIN HFA) 90 mcg/actuation inhaler Inhale 2 puffs into the lungs every 6 (six) hours as needed.    furosemide (LASIX) 20 MG tablet Take 1 tablet (20 mg total) by mouth once daily. for 2 doses    PNV62-FA-om3-dha-epa-fish oil (PRENATAL GUMMY) 400 mcg-35 mg -25 mg-5 mg Chew Take by mouth.       Review of patient's allergies indicates:   Allergen Reactions    Aspirin Hives        Family History       Problem Relation (Age of Onset)    Hypertension Mother    No Known Problems Father, Sister, Brother, Son, Maternal Aunt, Maternal Uncle, Paternal Aunt, Paternal Uncle, Maternal Grandmother, Maternal Grandfather, Paternal Grandmother, Paternal Grandfather          Tobacco Use    Smoking status: Never    Smokeless tobacco: Never   Substance and Sexual Activity    Alcohol use: Not Currently     Comment: oss    Drug use: Not Currently     Types: Marijuana     Comment: last use 8/20/2016    Sexual activity: Yes     Partners: Male     Birth control/protection: None     Comment: single     Review of Systems   Constitutional:  Negative for activity change, appetite change, chills, diaphoresis, fatigue and fever.   Eyes:  Negative for visual disturbance.   Respiratory:  Negative for cough, shortness of breath and wheezing.    Cardiovascular:  Positive for chest pain. Negative for palpitations.   Gastrointestinal:  Positive for abdominal pain. Negative for constipation, diarrhea, nausea and vomiting.   Genitourinary:  Positive for pelvic pain. Negative for dysuria, genital sores, urgency, vaginal bleeding, vaginal discharge,  vaginal pain and vaginal odor.   Musculoskeletal:  Negative for back pain, joint swelling and myalgias.   Integumentary:  Negative for rash.   Neurological:  Negative for seizures, syncope, numbness and headaches.   Hematological:  Negative for adenopathy. Does not bruise/bleed easily.   Psychiatric/Behavioral:  Negative for depression. The patient is nervous/anxious.       Objective:     Vital Signs (Most Recent):  Temp: 98.7 °F (37.1 °C) (08/14/23 0731)  Pulse: 105 (08/14/23 0800)  Resp: 18 (08/14/23 0731)  BP: (Abnormal) 161/99 (08/14/23 0731)  SpO2: 99 % (08/14/23 0731) Vital Signs (24h Range):  Temp:  [98 °F (36.7 °C)-99.6 °F (37.6 °C)] 98.7 °F (37.1 °C)  Pulse:  [] 105  Resp:  [15-20] 18  SpO2:  [99 %-100 %] 99 %  BP: (150-171)/() 161/99     Weight: 76.6 kg (168 lb 14 oz)  Body mass index is 28.1 kg/m².    Patient's last menstrual period was 11/07/2022.     Physical Exam:   Constitutional: She is oriented to person, place, and time. She appears well-developed and well-nourished. No distress.    HENT:   Head: Normocephalic and atraumatic.    Eyes: Conjunctivae and EOM are normal.      Pulmonary/Chest: Effort normal.        Abdominal: Soft. She exhibits abdominal incision (separation immediately right of midline; dark clot and blood expressed until bleeding light.  Prsssure dressing applied.). There is abdominal tenderness.     Genitourinary:    Genitourinary Comments: Fundus firm below umbilicus  Lochia minimal             Musculoskeletal: Normal range of motion. No tenderness.       Neurological: She is alert and oriented to person, place, and time.          Laboratory:  Recent Lab Results         08/14/23  0542   08/13/23 2135        Albumin 2.7   2.8       Alkaline Phosphatase 113   119       ALT 12   13       Anion Gap 14   12       aPTT   29.4  Comment: Refer to local heparin nomogram for intensity/dose specific   therapeutic   range.  LOT^050^APTT FSL^[id number]         AST 14   13       Baso #  0.03   0.04       Basophil % 0.2   0.2       BILIRUBIN TOTAL 0.9  Comment: For infants and newborns, interpretation of results should be based  on gestational age, weight and in agreement with clinical  observations.    Premature Infant recommended reference ranges:  Up to 24 hours.............<8.0 mg/dL  Up to 48 hours............<12.0 mg/dL  3-5 days..................<15.0 mg/dL  6-29 days.................<15.0 mg/dL     0.9  Comment: For infants and newborns, interpretation of results should be based  on gestational age, weight and in agreement with clinical  observations.    Premature Infant recommended reference ranges:  Up to 24 hours.............<8.0 mg/dL  Up to 48 hours............<12.0 mg/dL  3-5 days..................<15.0 mg/dL  6-29 days.................<15.0 mg/dL         BUN 6   9       Calcium 8.8   8.8       Chloride 106   106       CO2 23   22       Creatinine 0.7   0.8       Differential Method Automated   Automated       eGFR >60   >60       Eos # 0.2   0.2       Eosinophil % 1.2   0.9       Glucose 81   94       Gran # (ANC) 9.8   13.0       Gran % 77.5   76.7       Group & Rh   O POS       Hematocrit 29.9   31.6       Hemoglobin 9.5   10.0       Immature Grans (Abs) 0.08  Comment: Mild elevation in immature granulocytes is non specific and   can be seen in a variety of conditions including stress response,   acute inflammation, trauma and pregnancy. Correlation with other   laboratory and clinical findings is essential.     0.07  Comment: Mild elevation in immature granulocytes is non specific and   can be seen in a variety of conditions including stress response,   acute inflammation, trauma and pregnancy. Correlation with other   laboratory and clinical findings is essential.         Immature Granulocytes 0.6   0.4       INDIRECT CARLOS   NEG       INR   1.2  Comment: Coumadin Therapy:  2.0 - 3.0 for INR for all indicators except mechanical heart valves  and antiphospholipid syndromes which  should use 2.5 - 3.5.  LOT^040^PT Inn^[id number]         Lymph # 1.7   2.4       Lymph % 13.6   14.1       MCH 25.0   24.9       MCHC 31.8   31.6       MCV 79   79       Mono # 0.9   1.3       Mono % 6.9   7.7       MPV 10.2   9.7       nRBC 0   0       Platelets 504   474       Potassium 2.8   3.3       PROTEIN TOTAL 6.8   7.1       Protime   12.3       RBC 3.80   4.01       RDW 15.9   16.1       Sodium 143   140       Specimen Outdate   08/16/2023 23:59       WBC 12.66   16.97               Diagnostic Results:  CT: Reviewed    Assessment/Plan:     Renal/  Postoperative hematoma involving genitourinary system following genitourinary procedure  Hematoma spontaneously draining from skin incision.  Pressure dressing applied.  Skin now soft and with less pain that presentation per patient.   H/H reviewed and is stable with improvement from postpartum discharge    Obstetric  * Hypertension in pregnancy, preeclampsia, severe, delivered/postpartum  Admit for pp Magnesium sulfate.  IV Labetalol now then per protocol.  Transfer to L&D for management.         Anna Domingo MD  Obstetrics & Gynecology  [facility] - [facility] S[facility] (3rd Fl)

## 2023-08-14 NOTE — HOSPITAL COURSE
HD#1 Pt admitted for post operative hematoma from the ER.  On assessment of patient, sutures placed in ER removed and hematoma expressed and allowed to drain from the incision.    BP reviewed since admission from the ER. Pt with severe range BP.  Labetalol immediately ordered and given.  Magnesium ordered. Will transfer to L&D for management.   HD#2 Pt s/p Magnesium Sulfate for 24 hours.  Procardia 30XL started this am.   Dressing changed this morning.

## 2023-08-14 NOTE — NURSING
Assessment of abdomen wound on handoff visibly small drop of bright red blood noted to lower abdomen  area. 4X4 gauze pad place over area.

## 2023-08-14 NOTE — ED PROVIDER NOTES
Encounter Date: 2023       History     Chief Complaint   Patient presents with    Post-op Problem     Reports bleeding from  on . Bleeding noted in triage.     23 year old  female with a PMHx of HIV, HSV presents to the ED with her mom with postop wound bleeding. Had a 23  by Dr. Domingo. States it started as a small, round swelling tonight and bleeding progressed. Denies any lightheadedness, syncope. Still has some lower abdominal pain from the  procedure. Has had some vaginal bleeding since the  but not heavier than usual or abnormal. Denies bleeding issues or blood thinners. Saw Dr. Domingo 23 and was doing well.        Review of patient's allergies indicates:   Allergen Reactions    Aspirin Hives     Past Medical History:   Diagnosis Date    Herpes simplex virus (HSV) infection     HIV infection     HIV positive     Hx of tonsillectomy     Seizures      Past Surgical History:   Procedure Laterality Date     SECTION  2017     SECTION N/A 2023    Procedure: REPEAT  SECTION;  Surgeon: Anna Domingo MD;  Location: [facility];  Service: OB/GYN;  Laterality: N/A;    TONSILLECTOMY      tubes in ears       Family History   Problem Relation Age of Onset    Hypertension Mother     No Known Problems Father     No Known Problems Sister     No Known Problems Brother     No Known Problems Son     No Known Problems Maternal Aunt     No Known Problems Maternal Uncle     No Known Problems Paternal Aunt     No Known Problems Paternal Uncle     No Known Problems Maternal Grandmother     No Known Problems Maternal Grandfather     No Known Problems Paternal Grandmother     No Known Problems Paternal Grandfather     ADD / ADHD Neg Hx     Alcohol abuse Neg Hx     Allergies Neg Hx     Asthma Neg Hx     Autism spectrum disorder Neg Hx     Behavior problems Neg Hx     Birth defects Neg Hx     Cancer Neg Hx     Chromosomal disorder Neg Hx      Cleft lip Neg Hx     Congenital heart disease Neg Hx     Depression Neg Hx     Diabetes Neg Hx     Early death Neg Hx     Eczema Neg Hx     Hearing loss Neg Hx     Heart disease Neg Hx     Hyperlipidemia Neg Hx     Kidney disease Neg Hx     Learning disabilities Neg Hx     Mental illness Neg Hx     Migraines Neg Hx     Neurodegenerative disease Neg Hx     Obesity Neg Hx     Seizures Neg Hx     SIDS Neg Hx     Thyroid disease Neg Hx     Other Neg Hx     Breast cancer Neg Hx     Ovarian cancer Neg Hx      Social History     Tobacco Use    Smoking status: Never    Smokeless tobacco: Never   Substance Use Topics    Alcohol use: Not Currently     Comment: oss    Drug use: Not Currently     Types: Marijuana     Comment: last use 8/20/2016     Review of Systems   Constitutional:  Negative for chills and fever.   HENT:  Negative for congestion, rhinorrhea and sneezing.    Eyes:  Negative for discharge and redness.   Respiratory:  Negative for cough and shortness of breath.    Cardiovascular:  Negative for chest pain and palpitations.   Gastrointestinal:  Positive for abdominal distention and abdominal pain. Negative for diarrhea, nausea and vomiting.   Genitourinary:  Positive for vaginal bleeding. Negative for dysuria, frequency and vaginal discharge.   Musculoskeletal:  Negative for back pain and neck pain.   Skin:  Positive for wound. Negative for rash.   Neurological:  Negative for weakness, numbness and headaches.       Physical Exam     Initial Vitals [08/13/23 2105]   BP Pulse Resp Temp SpO2   (!) 156/106 102 20 98 °F (36.7 °C) 99 %      MAP       --         Physical Exam    Nursing note and vitals reviewed.  Constitutional: She appears well-developed. She is not diaphoretic. No distress.   HENT:   Head: Normocephalic and atraumatic.   Right Ear: External ear normal.   Left Ear: External ear normal.   Eyes: Right eye exhibits no discharge. Left eye exhibits no discharge. No scleral icterus.   Neck: Neck supple.    Cardiovascular:  Normal rate and regular rhythm.           Pulmonary/Chest: Breath sounds normal. No stridor. No respiratory distress. She has no wheezes. She has no rhonchi. She has no rales.   Abdominal: Abdomen is soft. There is abdominal tenderness in the suprapubic area.   No right CVA tenderness.  No left CVA tenderness.     There is no guarding.   Musculoskeletal:         General: No edema.      Cervical back: Neck supple.     Neurological: She is alert and oriented to person, place, and time.   Skin: Skin is warm and dry. Capillary refill takes less than 2 seconds.   Psychiatric: She has a normal mood and affect.             ED Course   Procedures  Labs Reviewed   CBC W/ AUTO DIFFERENTIAL - Abnormal; Notable for the following components:       Result Value    WBC 16.97 (*)     Hemoglobin 10.0 (*)     Hematocrit 31.6 (*)     MCV 79 (*)     MCH 24.9 (*)     MCHC 31.6 (*)     RDW 16.1 (*)     Platelets 474 (*)     Gran # (ANC) 13.0 (*)     Immature Grans (Abs) 0.07 (*)     Mono # 1.3 (*)     Gran % 76.7 (*)     Lymph % 14.1 (*)     All other components within normal limits   COMPREHENSIVE METABOLIC PANEL - Abnormal; Notable for the following components:    Potassium 3.3 (*)     CO2 22 (*)     Albumin 2.8 (*)     All other components within normal limits   PROTIME-INR   APTT   TYPE & SCREEN          Imaging Results               CT Abdomen Pelvis With Contrast (Final result)  Result time 23 00:30:02      Final result by Fan Carias MD (23 00:30:02)                   Impression:      1. 11.4 x 3.0 x 10.2 cm hematoma in the anterior pelvic wall at the site of recent .  The hematoma minimally extends into the pelvis slightly posterior to the surgical bed with 3.1 x 2.9 cm hematoma slightly right of midline.  2. Probable small hematoma within the endometrial cavity and uterine scar in the lower anterior uterine segment.  3. Recommend OB gyn follow-up.  4.  This report was flagged in Epic  as abnormal.      Electronically signed by: Fan Stein  Date:    2023  Time:    00:30               Narrative:    EXAMINATION:  CT ABDOMEN PELVIS WITH CONTRAST    CLINICAL HISTORY:  Abdominal pain, post-op;postop bleed; ??    TECHNIQUE:  Low dose axial images, sagittal and coronal reformations were obtained from the lung bases to the pubic symphysis following the IV administration of 75 mL of Omnipaque 350 .  Oral contrast was not administered.    COMPARISON:  2019    FINDINGS:  Abdomen:    - Lower thorax:    - Lung bases: No infiltrates and no nodules.    - Liver: No focal mass.    - Gallbladder: No calcified gallstones.    - Bile Ducts: No evidence of intra or extra hepatic biliary ductal dilation.    - Spleen: Negative.    - Kidneys: No stone, mass or hydronephrosis bilaterally.    - Adrenals: Unremarkable.    - Pancreas: No mass or peripancreatic fat stranding.    - Retroperitoneum:  No significant adenopathy.    - Vascular: No abdominal aortic aneurysm.    - Abdominal wall:  Small fat containing umbilical hernia.    Probable recent  scar at the anterior lower pelvis.    There is a 11.4 x 3.0 x 10.2 cm hematoma in the anterior pelvic wall at the surgical site.    Mild edema in the subcutaneous tissues.    The hematoma extends posterior into the intrapelvic cavity on axial 143 of series 4 into a lobular collection measuring 3.1 x 2.9 cm slightly right of midline on axial 136 of series 4.    Pelvis:    Uterus is midline with recent .  Probable hematoma within the endometrial cavity and uterine scar in the lower anterior uterine segment on sagittal 110 series 604.  Follow-up recommended.    Bowel/Mesentery:    No evidence of bowel obstruction or inflammation.    Bones:  No acute osseous abnormality and no suspicious lytic or blastic lesion.                                       Medications   HYDROmorphone injection 1 mg (has no administration in time range)   morphine injection 4  mg (4 mg Intravenous Given 23)   ondansetron injection 4 mg (4 mg Intravenous Given 23)   LIDOcaine (PF) 10 mg/ml (1%) injection 50 mg (50 mg Infiltration Given 23)   iohexoL (OMNIPAQUE 350) injection 75 mL (75 mLs Intravenous Given 23)     Medical Decision Making:   Differential Diagnosis:   Ddx: postop bleeding, wound dehiscence, anemia, shock, internal bleeding  ED Management:  Patient with small, focal area of persistent bleeding. Silver nitrate used x 2 with resolution. However, it bled again 1 hour later. 2 figure of 8 sutures placed and bleeding stopped.    Based on the patient's evaluation - patient will need observation for postop hematoma. Hemodynamically stable, Hgb 10. 11.4 x 3 x 10.2 cm hematoma on CT imaging at the site of recent . 2nd 3.1 x 2.9 cm  hematoma noted as well.     Abdominal binder to be placed in the ED. NPO. Type and screen, coags done.    I discussed the case with Dr. Cortés from OBGYN who was happy to assist. Recommends observation, CBC in AM, and will see in the AM. Patient and her mother updated and agree with the plan.                          Clinical Impression:   Final diagnoses:  [Z48.89] Encounter for post surgical wound check (Primary)  [G89.18] Post-operative pain  [S30.1XXA] Abdominal hematoma  [B20] HIV, symptomatic        ED Disposition Condition    Observation Stable                Wade Thompson DO  23 0053

## 2023-08-14 NOTE — SUBJECTIVE & OBJECTIVE
OB History    Para Term  AB Living   2 2 2 0 0 1   SAB IAB Ectopic Multiple Live Births   0 0 0 0 1      # Outcome Date GA Lbr Steve/2nd Weight Sex Delivery Anes PTL Lv   2 Term 23 38w0d  3.515 kg (7 lb 12 oz) F CS-LTranv Spinal N BOB      Complications: Meconium passage during delivery      Name: PRINCE LEWIS      Apgar1: 8  Apgar5: 9   1 Term 17 37w1d  2.91 kg (6 lb 6.7 oz) M CS-LTranv Spinal        Name: RO LEWIS      Apgar1: 7  Apgar5: 9     Past Medical History:   Diagnosis Date    Herpes simplex virus (HSV) infection     HIV infection     HIV positive     Hx of tonsillectomy     Seizures      Past Surgical History:   Procedure Laterality Date     SECTION  2017     SECTION N/A 2023    Procedure: REPEAT  SECTION;  Surgeon: Anna Domingo MD;  Location: [facility] OR;  Service: OB/GYN;  Laterality: N/A;    TONSILLECTOMY      tubes in ears         PTA Medications   Medication Sig    emtricitabine-tenofovir 200-300 mg (TRUVADA) 200-300 mg Tab Take 1 tablet by mouth once daily.    ibuprofen (ADVIL,MOTRIN) 800 MG tablet Take 1 tablet (800 mg total) by mouth every 8 (eight) hours.    ondansetron (ZOFRAN) 4 MG tablet Take 1 tablet (4 mg total) by mouth daily as needed for Nausea.    oxyCODONE (ROXICODONE) 5 MG immediate release tablet Take 1 tablet (5 mg total) by mouth every 4 (four) hours as needed for Pain.    raltegravir (ISENTRESS HD) 600 mg tablet Take 2 tablets (1,200 mg total) by mouth once daily.    valACYclovir (VALTREX) 500 MG tablet Take 1 tablet (500 mg total) by mouth 2 (two) times daily.    albuterol (PROVENTIL/VENTOLIN HFA) 90 mcg/actuation inhaler Inhale 2 puffs into the lungs every 6 (six) hours as needed.    furosemide (LASIX) 20 MG tablet Take 1 tablet (20 mg total) by mouth once daily. for 2 doses    PNV62-[id number]-dha-epa-fish oil (PRENATAL GUMMY) 400 mcg-35 mg -25 mg-5 mg Chew Take by mouth.       Review of patient's  allergies indicates:   Allergen Reactions    Aspirin Hives        Family History       Problem Relation (Age of Onset)    Hypertension Mother    No Known Problems Father, Sister, Brother, Son, Maternal Aunt, Maternal Uncle, Paternal Aunt, Paternal Uncle, Maternal Grandmother, Maternal Grandfather, Paternal Grandmother, Paternal Grandfather          Tobacco Use    Smoking status: Never    Smokeless tobacco: Never   Substance and Sexual Activity    Alcohol use: Not Currently     Comment: oss    Drug use: Not Currently     Types: Marijuana     Comment: last use 8/20/2016    Sexual activity: Yes     Partners: Male     Birth control/protection: None     Comment: single     Review of Systems   Constitutional:  Negative for activity change, appetite change, chills, diaphoresis, fatigue and fever.   Eyes:  Negative for visual disturbance.   Respiratory:  Negative for cough, shortness of breath and wheezing.    Cardiovascular:  Positive for chest pain. Negative for palpitations.   Gastrointestinal:  Positive for abdominal pain. Negative for constipation, diarrhea, nausea and vomiting.   Genitourinary:  Positive for pelvic pain. Negative for dysuria, genital sores, urgency, vaginal bleeding, vaginal discharge, vaginal pain and vaginal odor.   Musculoskeletal:  Negative for back pain, joint swelling and myalgias.   Integumentary:  Negative for rash.   Neurological:  Negative for seizures, syncope, numbness and headaches.   Hematological:  Negative for adenopathy. Does not bruise/bleed easily.   Psychiatric/Behavioral:  Negative for depression. The patient is nervous/anxious.       Objective:     Vital Signs (Most Recent):  Temp: 98.7 °F (37.1 °C) (08/14/23 0731)  Pulse: 105 (08/14/23 0800)  Resp: 18 (08/14/23 0731)  BP: (Abnormal) 161/99 (08/14/23 0731)  SpO2: 99 % (08/14/23 0731) Vital Signs (24h Range):  Temp:  [98 °F (36.7 °C)-99.6 °F (37.6 °C)] 98.7 °F (37.1 °C)  Pulse:  [] 105  Resp:  [15-20] 18  SpO2:  [99 %-100 %]  99 %  BP: (150-171)/() 161/99     Weight: 76.6 kg (168 lb 14 oz)  Body mass index is 28.1 kg/m².    Patient's last menstrual period was 11/07/2022.     Physical Exam:   Constitutional: She is oriented to person, place, and time. She appears well-developed and well-nourished. No distress.    HENT:   Head: Normocephalic and atraumatic.    Eyes: Conjunctivae and EOM are normal.      Pulmonary/Chest: Effort normal.        Abdominal: Soft. She exhibits abdominal incision (separation immediately right of midline; dark clot and blood expressed until bleeding light.  Prsssure dressing applied.). There is abdominal tenderness.     Genitourinary:    Genitourinary Comments: Fundus firm below umbilicus  Lochia minimal             Musculoskeletal: Normal range of motion. No tenderness.       Neurological: She is alert and oriented to person, place, and time.          Laboratory:  Recent Lab Results         08/14/23  0542   08/13/23  2135        Albumin 2.7   2.8       Alkaline Phosphatase 113   119       ALT 12   13       Anion Gap 14   12       aPTT   29.4  Comment: Refer to local heparin nomogram for intensity/dose specific   therapeutic   range.  LOT^050^APTT FSL^[id number]         AST 14   13       Baso # 0.03   0.04       Basophil % 0.2   0.2       BILIRUBIN TOTAL 0.9  Comment: For infants and newborns, interpretation of results should be based  on gestational age, weight and in agreement with clinical  observations.    Premature Infant recommended reference ranges:  Up to 24 hours.............<8.0 mg/dL  Up to 48 hours............<12.0 mg/dL  3-5 days..................<15.0 mg/dL  6-29 days.................<15.0 mg/dL     0.9  Comment: For infants and newborns, interpretation of results should be based  on gestational age, weight and in agreement with clinical  observations.    Premature Infant recommended reference ranges:  Up to 24 hours.............<8.0 mg/dL  Up to 48 hours............<12.0 mg/dL  3-5  days..................<15.0 mg/dL  6-29 days.................<15.0 mg/dL         BUN 6   9       Calcium 8.8   8.8       Chloride 106   106       CO2 23   22       Creatinine 0.7   0.8       Differential Method Automated   Automated       eGFR >60   >60       Eos # 0.2   0.2       Eosinophil % 1.2   0.9       Glucose 81   94       Gran # (ANC) 9.8   13.0       Gran % 77.5   76.7       Group & Rh   O POS       Hematocrit 29.9   31.6       Hemoglobin 9.5   10.0       Immature Grans (Abs) 0.08  Comment: Mild elevation in immature granulocytes is non specific and   can be seen in a variety of conditions including stress response,   acute inflammation, trauma and pregnancy. Correlation with other   laboratory and clinical findings is essential.     0.07  Comment: Mild elevation in immature granulocytes is non specific and   can be seen in a variety of conditions including stress response,   acute inflammation, trauma and pregnancy. Correlation with other   laboratory and clinical findings is essential.         Immature Granulocytes 0.6   0.4       INDIRECT CARLOS   NEG       INR   1.2  Comment: Coumadin Therapy:  2.0 - 3.0 for INR for all indicators except mechanical heart valves  and antiphospholipid syndromes which should use 2.5 - 3.5.  LOT^040^PT Inn^[id number]         Lymph # 1.7   2.4       Lymph % 13.6   14.1       MCH 25.0   24.9       MCHC 31.8   31.6       MCV 79   79       Mono # 0.9   1.3       Mono % 6.9   7.7       MPV 10.2   9.7       nRBC 0   0       Platelets 504   474       Potassium 2.8   3.3       PROTEIN TOTAL 6.8   7.1       Protime   12.3       RBC 3.80   4.01       RDW 15.9   16.1       Sodium 143   140       Specimen Outdate   08/16/2023 23:59       WBC 12.66   16.97               Diagnostic Results:  CT: Reviewed

## 2023-08-14 NOTE — NURSING
Received call from MD. Reports that's what I want it to do. I want that old blood to come out. Report of patient status given. MD reports to have suture removal ready whenever she is through in OR. Verbal read back given.

## 2023-08-14 NOTE — NURSING
Pt up from ed via w/c report from mary pro rn oriented pt to room and surroundings call light in reach plan of care reviewed with pt and pt verbalized understanding no distress noted

## 2023-08-14 NOTE — ASSESSMENT & PLAN NOTE
Admit for pp Magnesium sulfate.  IV Labetalol now then per protocol.  Transfer to L&D for management.

## 2023-08-14 NOTE — NURSING
Called to room patient c/o more bleeding from wound area. 4X4 gauze saturated with blood. ABD pad placed over bleeding area. Repositioned of abdominal binder placed. Patient very concerned and anxious about how this bleeding presented. Reports it started like this before and then it just gushed all over the place.

## 2023-08-14 NOTE — HPI
"Pt is a  @ 38 0/7 WGA who presented to the ER with abdominal pain and bloody drainage from her  incision.  She is s/p RLTCS on 23; hospital course complicated by post op blood transfusion.  Pt has been monitoring BP at home and denies any above severe range parameters, but states that they have been "very close". She also reports some chest heaviness.  "

## 2023-08-14 NOTE — PLAN OF CARE
[facility] - Labor & Delivery  Discharge Assessment    Primary Care Provider: No, Primary Doctor     Discharge Assessment (most recent)       BRIEF DISCHARGE ASSESSMENT - 08/14/23 1030          Discharge Planning    Assessment Type Discharge Planning Brief Assessment (P)      Equipment Currently Used at Home none (P)      Current Living Arrangements home (P)      Patient/Family Anticipates Transition to home (P)      Patient/Family Anticipated Services at Transition none (P)      DME Needed Upon Discharge  other (see comments) (P)    TBD    Discharge Plan A Home;Home with family (P)      Discharge Plan B Home with family (P)                        Brief assessment complete from the medical record. Please place CM consult or call for any needs/concerns.

## 2023-08-14 NOTE — NURSING
"Pt readmitted to med surg last night for  incision monitoring d/t bloody drainage and pain; incision drained this morning by , pressure dressing applied with abdominal binder, no drainage noted to dressing throughout shift.Orders for pt to start magnesium infusion for postpartum pre-e this morning; mag started at 0922am, PIV to R AC infusing well; blood pressures remain elevated throughout shift 150s-160s/90s-100s, pt has had a few severe range pressures but nothing consistent requiring medications; MD ordered 1x dose of labetalol to try to decrease BPs that have been consistently evaluated. Reflexes +2 bilaterally, pt appears drowsy, reports consistent headache that started around the same time magnesium infusion started; pt also reports "chest tightness" that has been present for a few days now. NAD noted. Mother was with pt this morning but otherwise has not had any visitors today. Pt tolerating to ambulate to bathroom to void, pt understands I&Os being monitored and to void in potty hat. On continuous cardiac monitor.  "

## 2023-08-14 NOTE — NURSING
Patient assisted up to bathroom. Scant vag bleeding noted after urination. Bleeding continued from open site to lower abdomen . Cleansed with CHG wipes/warm bath wipes. Changed soiled ABD pad. Reapplied ABD pad X 2. Abdominal binder repositioned.

## 2023-08-14 NOTE — ED NOTES
Pt transferred to floor via wheelchair. NADN during transfer. Pt accompanied by RN. Pt report handed to TESSY Greer.

## 2023-08-15 VITALS
SYSTOLIC BLOOD PRESSURE: 135 MMHG | RESPIRATION RATE: 16 BRPM | BODY MASS INDEX: 28.14 KG/M2 | TEMPERATURE: 98 F | OXYGEN SATURATION: 99 % | HEART RATE: 101 BPM | WEIGHT: 168.88 LBS | HEIGHT: 65 IN | DIASTOLIC BLOOD PRESSURE: 86 MMHG

## 2023-08-15 LAB
ALBUMIN SERPL BCP-MCNC: 2.7 G/DL (ref 3.5–5.2)
ALP SERPL-CCNC: 115 U/L (ref 55–135)
ALT SERPL W/O P-5'-P-CCNC: 14 U/L (ref 10–44)
ANION GAP SERPL CALC-SCNC: 11 MMOL/L (ref 8–16)
AST SERPL-CCNC: 15 U/L (ref 10–40)
BASOPHILS # BLD AUTO: 0.03 K/UL (ref 0–0.2)
BASOPHILS NFR BLD: 0.3 % (ref 0–1.9)
BILIRUB SERPL-MCNC: 0.6 MG/DL (ref 0.1–1)
BUN SERPL-MCNC: 3 MG/DL (ref 6–20)
CALCIUM SERPL-MCNC: 7.2 MG/DL (ref 8.7–10.5)
CHLORIDE SERPL-SCNC: 99 MMOL/L (ref 95–110)
CO2 SERPL-SCNC: 26 MMOL/L (ref 23–29)
CREAT SERPL-MCNC: 0.7 MG/DL (ref 0.5–1.4)
DIFFERENTIAL METHOD: ABNORMAL
EOSINOPHIL # BLD AUTO: 0.1 K/UL (ref 0–0.5)
EOSINOPHIL NFR BLD: 1.1 % (ref 0–8)
ERYTHROCYTE [DISTWIDTH] IN BLOOD BY AUTOMATED COUNT: 16 % (ref 11.5–14.5)
EST. GFR  (NO RACE VARIABLE): >60 ML/MIN/1.73 M^2
GLUCOSE SERPL-MCNC: 114 MG/DL (ref 70–110)
HCT VFR BLD AUTO: 30.1 % (ref 37–48.5)
HGB BLD-MCNC: 9.8 G/DL (ref 12–16)
IMM GRANULOCYTES # BLD AUTO: 0.06 K/UL (ref 0–0.04)
IMM GRANULOCYTES NFR BLD AUTO: 0.5 % (ref 0–0.5)
LYMPHOCYTES # BLD AUTO: 2 K/UL (ref 1–4.8)
LYMPHOCYTES NFR BLD: 17 % (ref 18–48)
MCH RBC QN AUTO: 24.9 PG (ref 27–31)
MCHC RBC AUTO-ENTMCNC: 32.6 G/DL (ref 32–36)
MCV RBC AUTO: 77 FL (ref 82–98)
MONOCYTES # BLD AUTO: 0.8 K/UL (ref 0.3–1)
MONOCYTES NFR BLD: 6.9 % (ref 4–15)
NEUTROPHILS # BLD AUTO: 8.9 K/UL (ref 1.8–7.7)
NEUTROPHILS NFR BLD: 74.2 % (ref 38–73)
NRBC BLD-RTO: 0 /100 WBC
PLATELET # BLD AUTO: 584 K/UL (ref 150–450)
PMV BLD AUTO: 9.8 FL (ref 9.2–12.9)
POTASSIUM SERPL-SCNC: 2.8 MMOL/L (ref 3.5–5.1)
PROT SERPL-MCNC: 6.9 G/DL (ref 6–8.4)
RBC # BLD AUTO: 3.93 M/UL (ref 4–5.4)
SODIUM SERPL-SCNC: 136 MMOL/L (ref 136–145)
WBC # BLD AUTO: 11.91 K/UL (ref 3.9–12.7)

## 2023-08-15 PROCEDURE — 36415 COLL VENOUS BLD VENIPUNCTURE: CPT | Performed by: OBSTETRICS & GYNECOLOGY

## 2023-08-15 PROCEDURE — 25000003 PHARM REV CODE 250: Performed by: STUDENT IN AN ORGANIZED HEALTH CARE EDUCATION/TRAINING PROGRAM

## 2023-08-15 PROCEDURE — 25000003 PHARM REV CODE 250: Performed by: OBSTETRICS & GYNECOLOGY

## 2023-08-15 PROCEDURE — 99231 SBSQ HOSP IP/OBS SF/LOW 25: CPT | Mod: TH,,, | Performed by: OBSTETRICS & GYNECOLOGY

## 2023-08-15 PROCEDURE — 99231 PR SUBSEQUENT HOSPITAL CARE,LEVL I: ICD-10-PCS | Mod: TH,,, | Performed by: OBSTETRICS & GYNECOLOGY

## 2023-08-15 PROCEDURE — 85025 COMPLETE CBC W/AUTO DIFF WBC: CPT | Performed by: OBSTETRICS & GYNECOLOGY

## 2023-08-15 PROCEDURE — 63600175 PHARM REV CODE 636 W HCPCS: Performed by: STUDENT IN AN ORGANIZED HEALTH CARE EDUCATION/TRAINING PROGRAM

## 2023-08-15 PROCEDURE — 63600175 PHARM REV CODE 636 W HCPCS: Performed by: OBSTETRICS & GYNECOLOGY

## 2023-08-15 PROCEDURE — 80053 COMPREHEN METABOLIC PANEL: CPT | Performed by: OBSTETRICS & GYNECOLOGY

## 2023-08-15 RX ORDER — NIFEDIPINE 30 MG/1
30 TABLET, EXTENDED RELEASE ORAL DAILY
Status: DISCONTINUED | OUTPATIENT
Start: 2023-08-15 | End: 2023-08-15 | Stop reason: HOSPADM

## 2023-08-15 RX ORDER — NIFEDIPINE 30 MG/1
30 TABLET, EXTENDED RELEASE ORAL DAILY
Qty: 30 TABLET | Refills: 1 | Status: SHIPPED | OUTPATIENT
Start: 2023-08-15 | End: 2024-08-14

## 2023-08-15 RX ADMIN — IBUPROFEN 800 MG: 800 TABLET, FILM COATED ORAL at 08:08

## 2023-08-15 RX ADMIN — NIFEDIPINE 30 MG: 30 TABLET, FILM COATED, EXTENDED RELEASE ORAL at 07:08

## 2023-08-15 RX ADMIN — EMTRICITABINE 200 MG: 200 CAPSULE ORAL at 08:08

## 2023-08-15 RX ADMIN — VALACYCLOVIR HYDROCHLORIDE 500 MG: 500 TABLET, FILM COATED ORAL at 08:08

## 2023-08-15 RX ADMIN — ONDANSETRON HYDROCHLORIDE 4 MG: 2 SOLUTION INTRAMUSCULAR; INTRAVENOUS at 02:08

## 2023-08-15 RX ADMIN — RALTEGRAVIR 1200 MG: 400 TABLET, FILM COATED ORAL at 08:08

## 2023-08-15 RX ADMIN — HYDROMORPHONE HYDROCHLORIDE 1 MG: 1 INJECTION, SOLUTION INTRAMUSCULAR; INTRAVENOUS; SUBCUTANEOUS at 08:08

## 2023-08-15 RX ADMIN — TENOFOVIR DISOPROXIL FUMARATE 300 MG: 300 TABLET, FILM COATED ORAL at 08:08

## 2023-08-15 RX ADMIN — MAGNESIUM SULFATE IN WATER 2 G/HR: 40 INJECTION, SOLUTION INTRAVENOUS at 03:08

## 2023-08-15 NOTE — DISCHARGE SUMMARY
"[facility] - Labor & Delivery  Obstetrics & Gynecology  Discharge Summary    Patient Name: Nidhi Willis  MRN: [id number]  Admission Date: 2023  Hospital Length of Stay: 1 days  Discharge Date and Time:  08/15/2023 5:40 PM  Attending Physician: Anna Domingo MD   Discharging Provider: Anna Domingo MD  Primary Care Provider: Sandi, Primary Doctor    HPI:  Pt is a  @ 38 0/7 WGA who presented to the ER with abdominal pain and bloody drainage from her  incision.  She is s/p RLTCS on 23; hospital course complicated by post op blood transfusion.  Pt has been monitoring BP at home and denies any above severe range parameters, but states that they have been "very close". She also reports some chest heaviness.      Hospital Course:  HD#1 Pt admitted for post operative hematoma from the ER.  On assessment of patient, sutures placed in ER removed and hematoma expressed and allowed to drain from the incision.    BP reviewed since admission from the ER. Pt with severe range BP.  Labetalol immediately ordered and given.  Magnesium ordered. Will transfer to L&D for management.   HD#2 Pt s/p Magnesium Sulfate for 24 hours.  Procardia 30XL started this am.   Dressing changed this morning.      Goals of Care Treatment Preferences:  Code Status: Full Code      * No surgery found *         Significant Diagnostic Studies: N/A      Pending Diagnostic Studies:     None        Final Active Diagnoses:    Diagnosis Date Noted POA    PRINCIPAL PROBLEM:  Hypertension in pregnancy, preeclampsia, severe, delivered/postpartum [O14.15] 2023 Yes    Postoperative hematoma involving genitourinary system following genitourinary procedure [N99.840] 2023 Yes      Problems Resolved During this Admission:        Discharged Condition: good    Disposition: Home or Self Care    Follow Up:   Follow-up Information     Anna Domingo MD Follow up on 2023.    Specialty: Obstetrics and " Gynecology  Why: For wound re-check  Contact information:  [address] [id number]  [phone]                       Patient Instructions:      Diet Adult Regular     Lifting restrictions   Order Comments: No lifting greater than 15 pounds     Pelvic Rest     Notify your health care provider if you experience any of the following:  temperature >100.4     Notify your health care provider if you experience any of the following:  persistent nausea and vomiting or diarrhea     Notify your health care provider if you experience any of the following:  severe uncontrolled pain     Notify your health care provider if you experience any of the following:  redness, tenderness, or signs of infection (pain, swelling, redness, odor or green/yellow discharge around incision site)     Notify your health care provider if you experience any of the following:  difficulty breathing or increased cough     Notify your health care provider if you experience any of the following:  severe persistent headache     Notify your health care provider if you experience any of the following:  worsening rash     Notify your health care provider if you experience any of the following:  persistent dizziness, light-headedness, or visual disturbances     Notify your health care provider if you experience any of the following:  increased confusion or weakness     Notify your health care provider if you experience any of the following:   Order Comments: Heavy vaginal bleeding (saturating 2 pads in 1 hour)     Remove dressing in 24 hours     Activity as tolerated     Medications:  Reconciled Home Medications:      Medication List      Start taking these medications    NIFEdipine 30 MG (OSM) 24 hr tablet  Commonly known as: PROCARDIA-XL  Take 1 tablet (30 mg total) by mouth once daily.        Continue taking these medications    albuterol 90 mcg/actuation inhaler  Commonly known as: PROVENTIL/VENTOLIN HFA  Inhale 2 puffs into the lungs  every 6 (six) hours as needed.     emtricitabine-tenofovir 200-300 mg 200-300 mg Tab  Commonly known as: TRUVADA  Take 1 tablet by mouth once daily.     ibuprofen 800 MG tablet  Commonly known as: ADVIL,MOTRIN  Take 1 tablet (800 mg total) by mouth every 8 (eight) hours.     ondansetron 4 MG tablet  Commonly known as: ZOFRAN  Take 1 tablet (4 mg total) by mouth daily as needed for Nausea.     oxyCODONE 5 MG immediate release tablet  Commonly known as: ROXICODONE  Take 1 tablet (5 mg total) by mouth every 4 (four) hours as needed for Pain.     PRENATAL GUMMY 400 mcg-35 mg -25 mg-5 mg Chew  Generic drug: [id number]-dha-epa-fish oil  Take by mouth.     raltegravir 600 mg tablet  Commonly known as: ISENTRESS HD  Take 2 tablets (1,200 mg total) by mouth once daily.     valACYclovir 500 MG tablet  Commonly known as: VALTREX  Take 1 tablet (500 mg total) by mouth 2 (two) times daily.        Stop taking these medications    furosemide 20 MG tablet  Commonly known as: AMBER Domingo MD  Obstetrics & Gynecology  [facility] - Labor & Delivery

## 2023-08-15 NOTE — NURSING
MD Jose L contacted via telephone to report diastolic pressures of 101 as per order. No interventions to be done unless diastolic BP is 110 or greater. Telephone order received to modify diastolic parameters.

## 2023-08-15 NOTE — PROGRESS NOTES
"[facility] - Labor & Delivery  Obstetrics & Gynecology  Progress Note    Patient Name: Nidhi Willis  MRN: [id number]  Admission Date: 2023  Primary Care Provider: Sandi, Primary Doctor  Principal Problem: Hypertension in pregnancy, preeclampsia, severe, delivered/postpartum    Subjective:     HPI:  Pt is a  @ 38 0/7 WGA who presented to the ER with abdominal pain and bloody drainage from her  incision.  She is s/p RLTCS on 23; hospital course complicated by post op blood transfusion.  Pt has been monitoring BP at home and denies any above severe range parameters, but states that they have been "very close". She also reports some chest heaviness.      Interval History: s/p Magnesium sulfate for 24 hours. Procardia 30XL started.  Pt still with intermittent headache. No drainage on dressing through the day, but she did saturate the dressing this morning.   Tolerating diet. Ambulating/voiding.      Scheduled Meds:   emtricitabine  200 mg Oral Daily    NIFEdipine  30 mg Oral Daily    raltegravir  1,200 mg Oral Daily    tenofovir disoproxil fumarate  300 mg Oral Daily    valACYclovir  500 mg Oral BID     Continuous Infusions:   lactated ringers 75 mL/hr at 23 2245    magnesium sulfate in water 2 g/hr (08/15/23 0645)     PRN Meds:acetaminophen, calcium gluconate, hydrALAZINE, HYDROmorphone, ibuprofen, labetalol, labetalol, melatonin, ondansetron, sodium chloride 0.9%    Review of patient's allergies indicates:   Allergen Reactions    Aspirin Hives       Objective:     Vital Signs (Most Recent):  Temp: 97.5 °F (36.4 °C) (08/15/23 0755)  Pulse: 107 (08/15/23 0800)  Resp: 18 (08/15/23 0829)  BP: (Abnormal) 152/106 (08/15/23 0800)  SpO2: 99 % (08/15/23 0745) Vital Signs (24h Range):  Temp:  [97.5 °F (36.4 °C)-100.2 °F (37.9 °C)] 97.5 °F (36.4 °C)  Pulse:  [] 107  Resp:  [16-18] 18  SpO2:  [95 %-100 %] 99 %  BP: (140-170)/() 152/106     Weight: 76.6 kg (168 lb 14 oz)  Body " mass index is 28.1 kg/m².  Patient's last menstrual period was 11/07/2022.    I&O (Last 24H):    Intake/Output Summary (Last 24 hours) at 8/15/2023 0847  Last data filed at 8/15/2023 0755  Gross per 24 hour   Intake 1208.93 ml   Output 2300 ml   Net -1091.07 ml         Laboratory:  Recent Lab Results         08/15/23  0559        Albumin 2.7       Alkaline Phosphatase 115       ALT 14       Anion Gap 11       AST 15       Baso # 0.03       Basophil % 0.3       BILIRUBIN TOTAL 0.6  Comment: For infants and newborns, interpretation of results should be based  on gestational age, weight and in agreement with clinical  observations.    Premature Infant recommended reference ranges:  Up to 24 hours.............<8.0 mg/dL  Up to 48 hours............<12.0 mg/dL  3-5 days..................<15.0 mg/dL  6-29 days.................<15.0 mg/dL         BUN 3       Calcium 7.2       Chloride 99       CO2 26       Creatinine 0.7       Differential Method Automated       eGFR >60       Eos # 0.1       Eosinophil % 1.1       Glucose 114       Gran # (ANC) 8.9       Gran % 74.2       Hematocrit 30.1       Hemoglobin 9.8       Immature Grans (Abs) 0.06  Comment: Mild elevation in immature granulocytes is non specific and   can be seen in a variety of conditions including stress response,   acute inflammation, trauma and pregnancy. Correlation with other   laboratory and clinical findings is essential.         Immature Granulocytes 0.5       Lymph # 2.0       Lymph % 17.0       MCH 24.9       MCHC 32.6       MCV 77       Mono # 0.8       Mono % 6.9       MPV 9.8       nRBC 0       Platelets 584       Potassium 2.8       PROTEIN TOTAL 6.9       RBC 3.93       RDW 16.0       Sodium 136       WBC 11.91               Diagnostic Results:  No new     Physical Exam:   Constitutional: She is oriented to person, place, and time. She appears well-developed and well-nourished. No distress.    HENT:   Head: Normocephalic and atraumatic.    Eyes:  Conjunctivae and EOM are normal.      Pulmonary/Chest: Effort normal. No respiratory distress.        Abdominal: Soft. She exhibits abdominal incision (Incision probed and blood clot removed. No more active bleeding at this time.). There is abdominal tenderness (appropriate with dressing change). There is no rebound and no guarding.             Musculoskeletal: Normal range of motion.       Neurological: She is alert and oriented to person, place, and time.    Skin: Skin is warm and dry.    Psychiatric: She has a normal mood and affect. Her behavior is normal. Judgment and thought content normal.        Review of Systems      Assessment/Plan:     * Hypertension in pregnancy, preeclampsia, severe, delivered/postpartum  S/p 24 hours of Magnesium sulfate.  Start Procardia 30XL      Postoperative hematoma involving genitourinary system following genitourinary procedure  Hematoma spontaneously draining from skin incision.Dressing removed this am with minimal bleeding this am.  Will shower and then pack following shower this am.   H/H this am reviewed and is stable from admission        Anna Domingo MD  Obstetrics & Gynecology  [facility] - Labor & Delivery

## 2023-08-15 NOTE — ASSESSMENT & PLAN NOTE
Hematoma spontaneously draining from skin incision.Dressing removed this am with minimal bleeding this am.  Will shower and then pack following shower this am.   H/H this am reviewed and is stable from admission

## 2023-08-15 NOTE — NURSING
Patient visualized having an emesis episode. New emesis bag provided. Emesis noted dark brown. Mediation offered and accepted.

## 2023-08-15 NOTE — NURSING
Dr. Domingo at bedside to assess abd incision, per MD to give PRN IV dilaudid for pain at this time.

## 2023-08-15 NOTE — NURSING
Dr. Domingo at bedside discussing plan of care with patient, new orders to d/c magnesium infusion at this time.

## 2023-08-15 NOTE — NURSING
Patient remained awake and alert for the remaining of the shift. Reports feeling better than yesterday but the headache remained throughout the shift. Pain medication offered multiple times and was refused. Education reinforced about the possible effects of Magnesium infusion. BP remained in the 140s-150s / 90s-100s with a few severe pressures, but nothing consistent that requires medication. MD notified of 101 diastolic pressures earlier in the shift and no further orders received. V/S parameters modified per MD orders to contact physician with systolic pressure equal/greater than 160 and diastolic pressure equal/greater than 110. Patient ambulated and voided independently with no difficulties multiple times throughout the night with outputs between 200-300 cc. Patient reported feeling nauseous but denied any medication at around 2100. At 2200 she experienced one episode of emesis  that was resolved with PRN Zofran 4 mg. Incision marked @ 0523 after small/medium sized blood drainage noted. No active bleeding noted at this time. Dressing remained intact. Gown changed for comfort. POC discussed. Verbalizes understanding. Denies any needs at this time.

## 2023-08-15 NOTE — NURSING
Dr. Domingo phoned unit for update on patient, reported pt c/o intermittent chest tightness and SOB while laying flat in bed. New orders noted, discussed with patient, v/u.

## 2023-08-15 NOTE — SUBJECTIVE & OBJECTIVE
Interval History: s/p Magnesium sulfate for 24 hours. Procardia 30XL started.  Pt still with intermittent headache. No drainage on dressing through the day, but she did saturate the dressing this morning.   Tolerating diet. Ambulating/voiding.      Scheduled Meds:   emtricitabine  200 mg Oral Daily    NIFEdipine  30 mg Oral Daily    raltegravir  1,200 mg Oral Daily    tenofovir disoproxil fumarate  300 mg Oral Daily    valACYclovir  500 mg Oral BID     Continuous Infusions:   lactated ringers 75 mL/hr at 08/14/23 2245    magnesium sulfate in water 2 g/hr (08/15/23 0645)     PRN Meds:acetaminophen, calcium gluconate, hydrALAZINE, HYDROmorphone, ibuprofen, labetalol, labetalol, melatonin, ondansetron, sodium chloride 0.9%    Review of patient's allergies indicates:   Allergen Reactions    Aspirin Hives       Objective:     Vital Signs (Most Recent):  Temp: 97.5 °F (36.4 °C) (08/15/23 0755)  Pulse: 107 (08/15/23 0800)  Resp: 18 (08/15/23 0829)  BP: (Abnormal) 152/106 (08/15/23 0800)  SpO2: 99 % (08/15/23 0745) Vital Signs (24h Range):  Temp:  [97.5 °F (36.4 °C)-100.2 °F (37.9 °C)] 97.5 °F (36.4 °C)  Pulse:  [] 107  Resp:  [16-18] 18  SpO2:  [95 %-100 %] 99 %  BP: (140-170)/() 152/106     Weight: 76.6 kg (168 lb 14 oz)  Body mass index is 28.1 kg/m².  Patient's last menstrual period was 11/07/2022.    I&O (Last 24H):    Intake/Output Summary (Last 24 hours) at 8/15/2023 0847  Last data filed at 8/15/2023 0755  Gross per 24 hour   Intake 1208.93 ml   Output 2300 ml   Net -1091.07 ml         Laboratory:  Recent Lab Results         08/15/23  0559        Albumin 2.7       Alkaline Phosphatase 115       ALT 14       Anion Gap 11       AST 15       Baso # 0.03       Basophil % 0.3       BILIRUBIN TOTAL 0.6  Comment: For infants and newborns, interpretation of results should be based  on gestational age, weight and in agreement with clinical  observations.    Premature Infant recommended reference ranges:  Up to 24  hours.............<8.0 mg/dL  Up to 48 hours............<12.0 mg/dL  3-5 days..................<15.0 mg/dL  6-29 days.................<15.0 mg/dL         BUN 3       Calcium 7.2       Chloride 99       CO2 26       Creatinine 0.7       Differential Method Automated       eGFR >60       Eos # 0.1       Eosinophil % 1.1       Glucose 114       Gran # (ANC) 8.9       Gran % 74.2       Hematocrit 30.1       Hemoglobin 9.8       Immature Grans (Abs) 0.06  Comment: Mild elevation in immature granulocytes is non specific and   can be seen in a variety of conditions including stress response,   acute inflammation, trauma and pregnancy. Correlation with other   laboratory and clinical findings is essential.         Immature Granulocytes 0.5       Lymph # 2.0       Lymph % 17.0       MCH 24.9       MCHC 32.6       MCV 77       Mono # 0.8       Mono % 6.9       MPV 9.8       nRBC 0       Platelets 584       Potassium 2.8       PROTEIN TOTAL 6.9       RBC 3.93       RDW 16.0       Sodium 136       WBC 11.91               Diagnostic Results:  No new     Physical Exam:   Constitutional: She is oriented to person, place, and time. She appears well-developed and well-nourished. No distress.    HENT:   Head: Normocephalic and atraumatic.    Eyes: Conjunctivae and EOM are normal.      Pulmonary/Chest: Effort normal. No respiratory distress.        Abdominal: Soft. She exhibits abdominal incision (Incision probed and blood clot removed. No more active bleeding at this time.). There is abdominal tenderness (appropriate with dressing change). There is no rebound and no guarding.             Musculoskeletal: Normal range of motion.       Neurological: She is alert and oriented to person, place, and time.    Skin: Skin is warm and dry.    Psychiatric: She has a normal mood and affect. Her behavior is normal. Judgment and thought content normal.        Review of Systems

## 2023-08-15 NOTE — PLAN OF CARE
"Visualized patient alert during bedside report. Noted "groggy". Reports having a frequent headache that doesn't go away with medication. More medication offered and refused. Denies any visual disturbances, epigastric pain, or chest pain. No edema noted. Pedal pulses assessed and normal. LR noted infusing at 75 mL/hr and Magnesium infusion at 50 mL/hr. IV remains patent. POC discussed. Verbalizes understanding. Patient instructed to call nurse when needing to use the bathroom for assistance and to monitor output. "Potty hat" placed on the toilet. Patient denies any needs at this time.     Problem: Adult Inpatient Plan of Care  Goal: Plan of Care Review  Outcome: Ongoing, Progressing  Goal: Patient-Specific Goal (Individualized)  Outcome: Ongoing, Progressing  Goal: Absence of Hospital-Acquired Illness or Injury  Outcome: Ongoing, Progressing  Goal: Optimal Comfort and Wellbeing  Outcome: Ongoing, Progressing  Goal: Readiness for Transition of Care  Outcome: Ongoing, Progressing     Problem: Fall Injury Risk  Goal: Absence of Fall and Fall-Related Injury  Outcome: Ongoing, Progressing     Problem:  Fall Injury Risk  Goal: Absence of Fall, Infant Drop and Related Injury  Outcome: Ongoing, Progressing     "

## 2023-08-16 ENCOUNTER — TELEPHONE (OUTPATIENT)
Dept: OBSTETRICS AND GYNECOLOGY | Facility: CLINIC | Age: 23
End: 2023-08-16
Payer: MEDICAID

## 2023-08-16 RX ORDER — IBUPROFEN 800 MG/1
800 TABLET ORAL EVERY 8 HOURS
Qty: 30 TABLET | Refills: 0 | Status: SHIPPED | OUTPATIENT
Start: 2023-08-16

## 2023-08-16 RX ORDER — OXYCODONE HYDROCHLORIDE 5 MG/1
5 TABLET ORAL EVERY 6 HOURS PRN
Qty: 8 TABLET | Refills: 0 | Status: SHIPPED | OUTPATIENT
Start: 2023-08-16 | End: 2023-08-18

## 2023-08-16 NOTE — TELEPHONE ENCOUNTER
Pt was called and she stated she was discharged from the hospital yesterday and desires rx for pain medication. Pt had  on  and was readmitted to hospital  on 23 due to complications with post-operative hematoma. Pt stated she is out of the ibuprofen and oxycodone that was given to her to 23. Please advise. Thank you.

## 2023-08-16 NOTE — TELEPHONE ENCOUNTER
----- Message from Christiana Shaver MA sent at 8/16/2023 11:26 AM CDT -----  Contact: self  Nidhi Willis  MRN: [id number]  Home Phone      [phone]  Work Phone      Not on file.  Mobile          [phone]    Patient Care Team:  No, Primary Doctor as PCP - General  Fanny Lozano MD as PCP - Infectious Disease (Infectious Diseases)  Anna Domingo MD as Consulting Physician (Obstetrics and Gynecology)  Frantz Chan MD as Obstetrician (Obstetrics)  OB? No  What phone number can you be reached at? [phone]  Message: Would like to see about getting Rx called in for pain.  Pharmacy:  [facility]

## 2023-08-18 ENCOUNTER — POSTPARTUM VISIT (OUTPATIENT)
Dept: OBSTETRICS AND GYNECOLOGY | Facility: CLINIC | Age: 23
End: 2023-08-18
Payer: MEDICAID

## 2023-08-18 VITALS
BODY MASS INDEX: 27.22 KG/M2 | DIASTOLIC BLOOD PRESSURE: 82 MMHG | HEART RATE: 86 BPM | HEIGHT: 65 IN | WEIGHT: 163.38 LBS | SYSTOLIC BLOOD PRESSURE: 124 MMHG

## 2023-08-18 DIAGNOSIS — Z13.32 ENCOUNTER FOR SCREENING FOR MATERNAL DEPRESSION: ICD-10-CM

## 2023-08-18 PROCEDURE — 0503F POSTPARTUM CARE VISIT: CPT | Mod: CPTII,,, | Performed by: OBSTETRICS & GYNECOLOGY

## 2023-08-18 PROCEDURE — 96160 PR PT FOCUSED HLTH RISK ASSMT: ICD-10-PCS | Mod: S$PBB,,, | Performed by: OBSTETRICS & GYNECOLOGY

## 2023-08-18 PROCEDURE — 96160 PT-FOCUSED HLTH RISK ASSMT: CPT | Mod: S$PBB,,, | Performed by: OBSTETRICS & GYNECOLOGY

## 2023-08-18 PROCEDURE — 99213 OFFICE O/P EST LOW 20 MIN: CPT | Mod: PBBFAC,TH | Performed by: OBSTETRICS & GYNECOLOGY

## 2023-08-18 PROCEDURE — 99999 PR PBB SHADOW E&M-EST. PATIENT-LVL III: CPT | Mod: PBBFAC,,, | Performed by: OBSTETRICS & GYNECOLOGY

## 2023-08-18 PROCEDURE — 99999 PR PBB SHADOW E&M-EST. PATIENT-LVL III: ICD-10-PCS | Mod: PBBFAC,,, | Performed by: OBSTETRICS & GYNECOLOGY

## 2023-08-18 PROCEDURE — 0503F PR POSTPARTUM CARE VISIT: ICD-10-PCS | Mod: CPTII,,, | Performed by: OBSTETRICS & GYNECOLOGY

## 2023-08-18 PROCEDURE — 96160 PT-FOCUSED HLTH RISK ASSMT: CPT | Mod: PBBFAC | Performed by: OBSTETRICS & GYNECOLOGY

## 2023-08-18 NOTE — PROGRESS NOTES
CC: Post-partum follow-up    Nidhi Willis is a 23 y.o. female  presents for a postpartum visit.  She is status post   RLTCS  2 weeks ago.  Her hospitalization was complicated by need for a blood transfusion.  She was readmitted this week for a subcutaneous hematoma and severe pre-eclampsia requiring IV antihypertensives and Magnesium sulfate.   She is not breastfeeding.  She desires Depo-Provera for contraception.  She denies postpartum depression. [place] depression scale score 0. She and the baby are doing well.  No pain.  No fever.   No bowel / bladder complaints.     Her last pap was 2023      ROS:  GENERAL: No fever, chills, fatigability.  VULVAR: No pain, no lesions and no itching.  VAGINAL: No relaxation, no itching, no discharge, no abnormal bleeding and no lesions.  ABDOMEN: No abdominal pain. Denies nausea. Denies vomiting. No diarrhea. No constipation  BREAST: Denies pain. No lumps. No discharge.  URINARY: No incontinence, no nocturia, no frequency and no dysuria.  CARDIOVASCULAR: No chest pain. No shortness of breath. No leg cramps.  NEUROLOGICAL: No headaches. No vision changes.    Past Medical History:   Diagnosis Date    Herpes simplex virus (HSV) infection     HIV infection     HIV positive     Hx of tonsillectomy     Seizures      Past Surgical History:   Procedure Laterality Date     SECTION  2017     SECTION N/A 2023    Procedure: REPEAT  SECTION;  Surgeon: Anna Domingo MD;  Location: [facility];  Service: OB/GYN;  Laterality: N/A;    TONSILLECTOMY      tubes in ears       Review of patient's allergies indicates:   Allergen Reactions    Aspirin Hives       Current Outpatient Medications:     albuterol (PROVENTIL/VENTOLIN HFA) 90 mcg/actuation inhaler, Inhale 2 puffs into the lungs every 6 (six) hours as needed., Disp: , Rfl:     emtricitabine-tenofovir 200-300 mg (TRUVADA) 200-300 mg Tab, Take 1 tablet by mouth once daily., Disp: 30  tablet, Rfl: 3    ibuprofen (ADVIL,MOTRIN) 800 MG tablet, Take 1 tablet (800 mg total) by mouth every 8 (eight) hours., Disp: 30 tablet, Rfl: 0    NIFEdipine (PROCARDIA-XL) 30 MG (OSM) 24 hr tablet, Take 1 tablet (30 mg total) by mouth once daily., Disp: 30 tablet, Rfl: 1    ondansetron (ZOFRAN) 4 MG tablet, Take 1 tablet (4 mg total) by mouth daily as needed for Nausea., Disp: 30 tablet, Rfl: 1    oxyCODONE (ROXICODONE) 5 MG immediate release tablet, Take 1 tablet (5 mg total) by mouth every 6 (six) hours as needed for Pain., Disp: 8 tablet, Rfl: 0    PNV62-FA-om3-dha-epa-fish oil (PRENATAL GUMMY) 400 mcg-35 mg -25 mg-5 mg Chew, Take by mouth., Disp: , Rfl:     raltegravir (ISENTRESS HD) 600 mg tablet, Take 2 tablets (1,200 mg total) by mouth once daily., Disp: 60 tablet, Rfl: 3    valACYclovir (VALTREX) 500 MG tablet, Take 1 tablet (500 mg total) by mouth 2 (two) times daily., Disp: 60 tablet, Rfl: 0      Vitals:    08/18/23 0922   BP: 124/82   Pulse: 86         Physical Exam  Vitals reviewed.   Constitutional:       General: She is not in acute distress.     Appearance: She is well-developed.   HENT:      Head: Normocephalic and atraumatic.   Eyes:      Conjunctiva/sclera: Conjunctivae normal.   Pulmonary:      Effort: Pulmonary effort is normal.   Abdominal:      Comments: Incision: healing well, minimal serosanguinous drainage, no erythema   Musculoskeletal:      Cervical back: Normal range of motion and neck supple.   Neurological:      Mental Status: She is alert and oriented to person, place, and time.   Psychiatric:         Behavior: Behavior normal.         Thought Content: Thought content normal.         Judgment: Judgment normal.           Assessment:    1. Normal postpartum exam  2. Doing well S/P  RLTCS  3. Pre-eclampsia pp  4. Post-op hematoma    Plan:    1. Wound care discussed  2. Instructions / precautions reviewed  3. Contraceptive counseling  4. Continue Procardia 30XL  5. Return: in 2 weeks.

## 2023-08-25 ENCOUNTER — TELEPHONE (OUTPATIENT)
Dept: OBSTETRICS AND GYNECOLOGY | Facility: CLINIC | Age: 23
End: 2023-08-25
Payer: MEDICAID

## 2023-08-25 NOTE — TELEPHONE ENCOUNTER
Pt called and stated that she feels that her incision is not closing like she feels it should be and sometimes has drainage. Pt keeping incision, clean and dry and denied redness to the site. Pt instructed to take a picture and send it through her portal as a message. Pt voiced understanding.

## 2023-09-01 ENCOUNTER — TELEPHONE (OUTPATIENT)
Dept: OBSTETRICS AND GYNECOLOGY | Facility: CLINIC | Age: 23
End: 2023-09-01

## 2023-09-01 NOTE — TELEPHONE ENCOUNTER
----- Message from Christiana Shaver MA sent at 9/1/2023  1:51 PM CDT -----  Contact: SELF  Nidhi Willis  MRN: [id number]  Home Phone      [phone]  Work Phone      Not on file.  Mobile          [phone]    Patient Care Team:  No, Primary Doctor as PCP - Fanny Hernandez MD as PCP - Infectious Disease (Infectious Diseases)  Anna Domingo MD as Consulting Physician (Obstetrics and Gynecology)  Frantz Chan MD as Obstetrician (Obstetrics)  OB? No  What phone number can you be reached at? [phone]  Message: Needs to r/s pp incision check appt.

## 2023-09-01 NOTE — TELEPHONE ENCOUNTER
Pt was called she is unable to make appt for incision check today. Pt stated that incision is almost completely closed and denied problems with incision. Pt is scheduled on 9/7/23 for 6 week PP visit and informed pt that Dr. Domingo can check her incision at that time. Pt voiced understanding.

## 2023-09-07 ENCOUNTER — POSTPARTUM VISIT (OUTPATIENT)
Dept: OBSTETRICS AND GYNECOLOGY | Facility: CLINIC | Age: 23
End: 2023-09-07
Payer: MEDICAID

## 2023-09-07 VITALS
BODY MASS INDEX: 26.1 KG/M2 | DIASTOLIC BLOOD PRESSURE: 76 MMHG | SYSTOLIC BLOOD PRESSURE: 124 MMHG | HEIGHT: 65 IN | HEART RATE: 72 BPM | WEIGHT: 156.63 LBS

## 2023-09-07 DIAGNOSIS — Z13.32 ENCOUNTER FOR SCREENING FOR MATERNAL DEPRESSION: ICD-10-CM

## 2023-09-07 DIAGNOSIS — Z30.013 INITIATION OF DEPO PROVERA: ICD-10-CM

## 2023-09-07 LAB
B-HCG UR QL: NEGATIVE
CTP QC/QA: YES

## 2023-09-07 PROCEDURE — 99213 OFFICE O/P EST LOW 20 MIN: CPT | Mod: PBBFAC,25 | Performed by: OBSTETRICS & GYNECOLOGY

## 2023-09-07 PROCEDURE — 0503F PR POSTPARTUM CARE VISIT: ICD-10-PCS | Mod: CPTII,,, | Performed by: OBSTETRICS & GYNECOLOGY

## 2023-09-07 PROCEDURE — 96160 PT-FOCUSED HLTH RISK ASSMT: CPT | Mod: S$PBB,,, | Performed by: OBSTETRICS & GYNECOLOGY

## 2023-09-07 PROCEDURE — 96160 PT-FOCUSED HLTH RISK ASSMT: CPT | Mod: PBBFAC | Performed by: OBSTETRICS & GYNECOLOGY

## 2023-09-07 PROCEDURE — 99999 PR PBB SHADOW E&M-EST. PATIENT-LVL III: ICD-10-PCS | Mod: PBBFAC,,, | Performed by: OBSTETRICS & GYNECOLOGY

## 2023-09-07 PROCEDURE — 99999PBSHW POCT URINE PREGNANCY: Mod: PBBFAC,,,

## 2023-09-07 PROCEDURE — 99999PBSHW POCT URINE PREGNANCY: ICD-10-PCS | Mod: PBBFAC,,,

## 2023-09-07 PROCEDURE — 0503F POSTPARTUM CARE VISIT: CPT | Mod: CPTII,,, | Performed by: OBSTETRICS & GYNECOLOGY

## 2023-09-07 PROCEDURE — 99999 PR PBB SHADOW E&M-EST. PATIENT-LVL III: CPT | Mod: PBBFAC,,, | Performed by: OBSTETRICS & GYNECOLOGY

## 2023-09-07 PROCEDURE — 81025 URINE PREGNANCY TEST: CPT | Mod: PBBFAC | Performed by: OBSTETRICS & GYNECOLOGY

## 2023-09-07 PROCEDURE — 96160 PR PT FOCUSED HLTH RISK ASSMT: ICD-10-PCS | Mod: S$PBB,,, | Performed by: OBSTETRICS & GYNECOLOGY

## 2023-09-07 RX ORDER — MEDROXYPROGESTERONE ACETATE 150 MG/ML
150 INJECTION, SUSPENSION INTRAMUSCULAR ONCE
Qty: 1 ML | Refills: 0 | Status: SHIPPED | OUTPATIENT
Start: 2023-09-07 | End: 2023-09-08

## 2023-09-07 NOTE — PROGRESS NOTES
CC: Post-partum follow-up    Nidhi Willis is a 23 y.o. female  presents for a postpartum visit.  She is status post  RLTCS 5 weeks ago.  Her hospitalization was not complicated. She was readmitted for severe pre-e and an incisional hematoma.  She is not breastfeeding.  She desires Depo-Provera for contraception.  She denies postpartum depression. [place] depression scale score 1. She and the baby are doing well.  No pain.  No fever.   No bowel / bladder complaints. Pregnancy was complicated by: HIV, h/o         Her last pap was 2023      ROS:  GENERAL: No fever, chills, fatigability.  VULVAR: No pain, no lesions and no itching.  VAGINAL: No relaxation, no itching, no discharge, no abnormal bleeding and no lesions.  ABDOMEN: No abdominal pain. Denies nausea. Denies vomiting. No diarrhea. No constipation  BREAST: Denies pain. No lumps. No discharge.  URINARY: No incontinence, no nocturia, no frequency and no dysuria.  CARDIOVASCULAR: No chest pain. No shortness of breath. No leg cramps.  NEUROLOGICAL: No headaches. No vision changes.    Past Medical History:   Diagnosis Date    Herpes simplex virus (HSV) infection     HIV infection     HIV positive     Hx of tonsillectomy     Seizures      Past Surgical History:   Procedure Laterality Date     SECTION  2017     SECTION N/A 2023    Procedure: REPEAT  SECTION;  Surgeon: Anna Domingo MD;  Location: [facility];  Service: OB/GYN;  Laterality: N/A;    TONSILLECTOMY      tubes in ears       Review of patient's allergies indicates:   Allergen Reactions    Aspirin Hives       Current Outpatient Medications:     albuterol (PROVENTIL/VENTOLIN HFA) 90 mcg/actuation inhaler, Inhale 2 puffs into the lungs every 6 (six) hours as needed., Disp: , Rfl:     emtricitabine-tenofovir 200-300 mg (TRUVADA) 200-300 mg Tab, Take 1 tablet by mouth once daily., Disp: 30 tablet, Rfl: 3    ibuprofen (ADVIL,MOTRIN) 800 MG  tablet, Take 1 tablet (800 mg total) by mouth every 8 (eight) hours., Disp: 30 tablet, Rfl: 0    NIFEdipine (PROCARDIA-XL) 30 MG (OSM) 24 hr tablet, Take 1 tablet (30 mg total) by mouth once daily., Disp: 30 tablet, Rfl: 1    ondansetron (ZOFRAN) 4 MG tablet, Take 1 tablet (4 mg total) by mouth daily as needed for Nausea., Disp: 30 tablet, Rfl: 1    raltegravir (ISENTRESS HD) 600 mg tablet, Take 2 tablets (1,200 mg total) by mouth once daily., Disp: 60 tablet, Rfl: 3    valACYclovir (VALTREX) 500 MG tablet, Take 1 tablet (500 mg total) by mouth 2 (two) times daily., Disp: 60 tablet, Rfl: 0    [id number]-dha-epa-fish oil (PRENATAL GUMMY) 400 mcg-35 mg -25 mg-5 mg Chew, Take by mouth., Disp: , Rfl:       Vitals:    09/07/23 1022   BP: 124/76   Pulse: 72       Physical Exam  Vitals reviewed.   Constitutional:       General: She is not in acute distress.     Appearance: She is well-developed.   HENT:      Head: Normocephalic and atraumatic.   Eyes:      Conjunctiva/sclera: Conjunctivae normal.   Pulmonary:      Effort: Pulmonary effort is normal.   Abdominal:      Comments: Incision healing well; small area of granulation tissue - silver nitrate applied   Musculoskeletal:      Cervical back: Normal range of motion and neck supple.   Neurological:      Mental Status: She is alert and oriented to person, place, and time.   Psychiatric:         Behavior: Behavior normal.         Thought Content: Thought content normal.         Judgment: Judgment normal.           Assessment:    1. Normal postpartum exam  2. Doing well S/P  RLTCS      Plan:    1. Routine follow up.  2. Instructions / precautions reviewed  3. Contraceptive counseling - DepoProvera today  4. May resume normal activities  5. Return: prn

## 2024-12-23 ENCOUNTER — PATIENT MESSAGE (OUTPATIENT)
Dept: RESEARCH | Facility: HOSPITAL | Age: 24
End: 2024-12-23
Payer: MEDICAID

## (undated) DEVICE — PACK C-SECTION CUSTOM

## (undated) DEVICE — ADHESIVE MASTISOL VIAL 48/BX

## (undated) DEVICE — SUT 3/0 36IN COATED VICRYL

## (undated) DEVICE — BLADE SURG CARBON STEEL #21

## (undated) DEVICE — SUT 0 36IN PDS II VIO MONO

## (undated) DEVICE — PAD SANITARY OB STERILE

## (undated) DEVICE — ELECTRODE REM PLYHSV RETURN 9

## (undated) DEVICE — PAD UNDERPAD 30X30

## (undated) DEVICE — TAPE MEDIPORE 3 X 10YD

## (undated) DEVICE — TRAY CATH FOL SIL URIMTR 16FR

## (undated) DEVICE — SUT CHROMIC GUT 2-0 CT-1 27IN

## (undated) DEVICE — DRESSING AQUACEL AG 3.5X10IN

## (undated) DEVICE — SOL NACL IRR 1000ML BTL

## (undated) DEVICE — SEE MEDLINE ITEM 152741

## (undated) DEVICE — SKIN STAPLER PMR35

## (undated) DEVICE — SUT CTD VICRYL 0 UND BR CT

## (undated) DEVICE — SOL WATER STRL IRR 1000ML

## (undated) DEVICE — TRAY CATH FOLEY 16FR STAT LOC

## (undated) DEVICE — Device

## (undated) DEVICE — TOWELS STERILE 18 X 25.5

## (undated) DEVICE — SEE MEDLINE ITEM 156931

## (undated) DEVICE — SUT 2-0 VICRYL / CT-1

## (undated) DEVICE — GOWN SURGEON XLG

## (undated) DEVICE — GOWN POLY REINF BRTH SLV XL

## (undated) DEVICE — BINDER ABDOM 4PANEL 12IN LG/XL